# Patient Record
Sex: MALE | Race: WHITE | NOT HISPANIC OR LATINO
[De-identification: names, ages, dates, MRNs, and addresses within clinical notes are randomized per-mention and may not be internally consistent; named-entity substitution may affect disease eponyms.]

---

## 2017-01-31 ENCOUNTER — APPOINTMENT (OUTPATIENT)
Dept: HEMATOLOGY ONCOLOGY | Facility: CLINIC | Age: 77
End: 2017-01-31

## 2017-02-10 ENCOUNTER — OUTPATIENT (OUTPATIENT)
Dept: OUTPATIENT SERVICES | Facility: HOSPITAL | Age: 77
LOS: 1 days | Discharge: HOME | End: 2017-02-10

## 2017-02-10 ENCOUNTER — APPOINTMENT (OUTPATIENT)
Dept: HEMATOLOGY ONCOLOGY | Facility: CLINIC | Age: 77
End: 2017-02-10

## 2017-02-10 VITALS
HEART RATE: 68 BPM | RESPIRATION RATE: 12 BRPM | HEIGHT: 70 IN | WEIGHT: 210 LBS | BODY MASS INDEX: 30.06 KG/M2 | SYSTOLIC BLOOD PRESSURE: 133 MMHG | TEMPERATURE: 96 F | DIASTOLIC BLOOD PRESSURE: 84 MMHG

## 2017-02-10 DIAGNOSIS — Z87.438 PERSONAL HISTORY OF OTHER DISEASES OF MALE GENITAL ORGANS: ICD-10-CM

## 2017-02-10 DIAGNOSIS — Z80.6 FAMILY HISTORY OF LEUKEMIA: ICD-10-CM

## 2017-02-13 LAB
BASOPHILS # BLD: 0.03 TH/MM3
BASOPHILS NFR BLD: 0.2 %
EOSINOPHIL # BLD: 0.25 TH/MM3
EOSINOPHIL NFR BLD: 1.3 %
ERYTHROCYTE [DISTWIDTH] IN BLOOD BY AUTOMATED COUNT: 12.8 %
GRANULOCYTES # BLD: 2.63 TH/MM3
GRANULOCYTES NFR BLD: 13.3 %
HCT VFR BLD AUTO: 39.2 %
HGB BLD-MCNC: 13.5 G/DL
IMM GRANULOCYTES # BLD: 0.03 TH/MM3
IMM GRANULOCYTES NFR BLD: 0.2 %
LYMPHOCYTES # BLD: 16.17 TH/MM3
LYMPHOCYTES NFR BLD: 82 %
MCH RBC QN AUTO: 33.6 PG
MCHC RBC AUTO-ENTMCNC: 34.4 G/DL
MCV RBC AUTO: 97.5 FL
MONOCYTES # BLD: 0.6 TH/MM3
MONOCYTES NFR BLD: 3 %
PLATELET # BLD: 190 TH/MM3
PMV BLD AUTO: 8.8 FL
RBC # BLD AUTO: 4.02 MIL/MM3
WBC # BLD: 19.71 TH/MM3

## 2017-06-23 ENCOUNTER — OUTPATIENT (OUTPATIENT)
Dept: OUTPATIENT SERVICES | Facility: HOSPITAL | Age: 77
LOS: 1 days | Discharge: HOME | End: 2017-06-23

## 2017-06-23 ENCOUNTER — APPOINTMENT (OUTPATIENT)
Dept: HEMATOLOGY ONCOLOGY | Facility: CLINIC | Age: 77
End: 2017-06-23

## 2017-06-23 VITALS
RESPIRATION RATE: 16 BRPM | SYSTOLIC BLOOD PRESSURE: 115 MMHG | WEIGHT: 222 LBS | HEIGHT: 70 IN | TEMPERATURE: 96.5 F | HEART RATE: 67 BPM | DIASTOLIC BLOOD PRESSURE: 64 MMHG | BODY MASS INDEX: 31.78 KG/M2

## 2017-06-23 DIAGNOSIS — K81.0 ACUTE CHOLECYSTITIS: ICD-10-CM

## 2017-06-23 LAB
BASOPHILS # BLD: 0.03 TH/MM3
BASOPHILS NFR BLD: 0.1 %
EOSINOPHIL # BLD: 0.3 TH/MM3
EOSINOPHIL NFR BLD: 1.4 %
ERYTHROCYTE [DISTWIDTH] IN BLOOD BY AUTOMATED COUNT: 12.9 %
GRANULOCYTES # BLD: 2.73 TH/MM3
GRANULOCYTES NFR BLD: 13.3 %
HCT VFR BLD AUTO: 40.4 %
HGB BLD-MCNC: 13.7 G/DL
IMM GRANULOCYTES # BLD: 0.04 TH/MM3
IMM GRANULOCYTES NFR BLD: 0.2 %
LYMPHOCYTES # BLD: 17.34 TH/MM3
LYMPHOCYTES NFR BLD: 83.4 %
MCH RBC QN AUTO: 33.7 PG
MCHC RBC AUTO-ENTMCNC: 33.9 G/DL
MCV RBC AUTO: 99.5 FL
MONOCYTES # BLD: 0.38 TH/MM3
MONOCYTES NFR BLD: 1.8 %
PLATELET # BLD: 175 TH/MM3
PMV BLD AUTO: 9.3 FL
RBC # BLD AUTO: 4.06 MIL/MM3
WBC # BLD: 20.78 TH/MM3

## 2017-06-27 DIAGNOSIS — I10 ESSENTIAL (PRIMARY) HYPERTENSION: ICD-10-CM

## 2017-06-27 DIAGNOSIS — E78.5 HYPERLIPIDEMIA, UNSPECIFIED: ICD-10-CM

## 2017-06-27 DIAGNOSIS — E03.9 HYPOTHYROIDISM, UNSPECIFIED: ICD-10-CM

## 2017-06-27 DIAGNOSIS — I50.32 CHRONIC DIASTOLIC (CONGESTIVE) HEART FAILURE: ICD-10-CM

## 2017-06-28 DIAGNOSIS — C91.10 CHRONIC LYMPHOCYTIC LEUKEMIA OF B-CELL TYPE NOT HAVING ACHIEVED REMISSION: ICD-10-CM

## 2017-08-09 ENCOUNTER — APPOINTMENT (OUTPATIENT)
Dept: HEMATOLOGY ONCOLOGY | Facility: CLINIC | Age: 77
End: 2017-08-09

## 2017-08-10 ENCOUNTER — APPOINTMENT (OUTPATIENT)
Dept: HEMATOLOGY ONCOLOGY | Facility: CLINIC | Age: 77
End: 2017-08-10

## 2017-12-21 ENCOUNTER — APPOINTMENT (OUTPATIENT)
Dept: HEMATOLOGY ONCOLOGY | Facility: CLINIC | Age: 77
End: 2017-12-21

## 2017-12-21 ENCOUNTER — OUTPATIENT (OUTPATIENT)
Dept: OUTPATIENT SERVICES | Facility: HOSPITAL | Age: 77
LOS: 1 days | Discharge: HOME | End: 2017-12-21

## 2017-12-21 VITALS
BODY MASS INDEX: 31.5 KG/M2 | SYSTOLIC BLOOD PRESSURE: 131 MMHG | DIASTOLIC BLOOD PRESSURE: 81 MMHG | TEMPERATURE: 97.6 F | WEIGHT: 220 LBS | HEIGHT: 70 IN | HEART RATE: 68 BPM

## 2017-12-21 LAB
BASOPHILS # BLD: 0.03 TH/MM3
BASOPHILS NFR BLD: 0.2 %
EOSINOPHIL # BLD: 0.3 TH/MM3
EOSINOPHIL NFR BLD: 1.5 %
ERYTHROCYTE [DISTWIDTH] IN BLOOD BY AUTOMATED COUNT: 12.7 %
GRANULOCYTES # BLD: 2.78 TH/MM3
GRANULOCYTES NFR BLD: 13.8 %
HCT VFR BLD AUTO: 37.6 %
HGB BLD-MCNC: 12.7 G/DL
IMM GRANULOCYTES # BLD: 0.07 TH/MM3
IMM GRANULOCYTES NFR BLD: 0.4 %
LYMPHOCYTES # BLD: 15.63 TH/MM3
LYMPHOCYTES NFR BLD: 78.5 %
MCH RBC QN AUTO: 34 PG
MCHC RBC AUTO-ENTMCNC: 33.8 G/DL
MCV RBC AUTO: 100.5 FL
MONOCYTES # BLD: 1.11 TH/MM3
MONOCYTES NFR BLD: 5.6 %
PLATELET # BLD: 160 TH/MM3
PMV BLD AUTO: 9.6 FL
RBC # BLD AUTO: 3.74 MIL/MM3
WBC # BLD: 19.92 TH/MM3

## 2017-12-26 DIAGNOSIS — C91.10 CHRONIC LYMPHOCYTIC LEUKEMIA OF B-CELL TYPE NOT HAVING ACHIEVED REMISSION: ICD-10-CM

## 2018-08-28 ENCOUNTER — LABORATORY RESULT (OUTPATIENT)
Age: 78
End: 2018-08-28

## 2018-08-28 ENCOUNTER — APPOINTMENT (OUTPATIENT)
Dept: HEMATOLOGY ONCOLOGY | Facility: CLINIC | Age: 78
End: 2018-08-28

## 2018-08-28 ENCOUNTER — OUTPATIENT (OUTPATIENT)
Dept: OUTPATIENT SERVICES | Facility: HOSPITAL | Age: 78
LOS: 1 days | Discharge: HOME | End: 2018-08-28

## 2018-08-28 VITALS
WEIGHT: 220 LBS | TEMPERATURE: 97.4 F | DIASTOLIC BLOOD PRESSURE: 75 MMHG | SYSTOLIC BLOOD PRESSURE: 111 MMHG | HEART RATE: 84 BPM | HEIGHT: 70 IN | BODY MASS INDEX: 31.5 KG/M2

## 2018-08-28 NOTE — REVIEW OF SYSTEMS
[Fever] : no fever [Chills] : no chills [Fatigue] : no fatigue [Recent Change In Weight] : ~T no recent weight change [Dysphagia] : no dysphagia [Chest Pain] : no chest pain [Lower Ext Edema] : no lower extremity edema [Shortness Of Breath] : no shortness of breath [SOB on Exertion] : no shortness of breath during exertion [Abdominal Pain] : no abdominal pain [Diarrhea] : no diarrhea [Joint Pain] : no joint pain [Skin Rash] : no skin rash [Easy Bleeding] : no tendency for easy bleeding [Negative] : Allergic/Immunologic

## 2018-08-28 NOTE — HISTORY OF PRESENT ILLNESS
[de-identified] : This is a 77 year old male here to establish care. He has a history of asymptomatic CLL.  His blood work from 10/27/2015, is showing a white count of 16.8 with lymphocytosis.  Hemoglobin was 13.8 and platelets were 226.  Folate, T4, TSH, vitamin D were normal.  Calcium was 9.1.  Normal LFTs. flow cytometry positive CD5 CD 23 ZAP 70 negative CD38 cw CLL normal FISH\par \par Blood work from last visit on 2/2017 showed WBC of 19.7, lymphs 82%, HgB 13.5 and plts of 190 with MCV 97.5.\par \par He has no complaints.\par \par Had a colonoscopy 2017 states it was normal and does not want another one.\par \par \par  [de-identified] : 12/21/17\par CC" Her for follow up for my CLL"\par He is doing well. Recovering from a cold. [facility] WBc 23.5, HgB 13.7, Pts 160 CMP b12 345 and foalte was >24, LFT were mormal. His HgB fromt today shows a fall to 12.7. MCV is a bit higher. \par \par He has no B symptom\par \par 8/28/18\par He is here for follow up. He states he is doing well. He has no complaints. [facility] not get [facility].

## 2018-08-28 NOTE — PHYSICAL EXAM
[Restricted in physically strenuous activity but ambulatory and able to carry out work of a light or sedentary nature] : Status 1- Restricted in physically strenuous activity but ambulatory and able to carry out work of a light or sedentary nature, e.g., light house work, office work [Normal] : affect appropriate

## 2018-08-28 NOTE — REASON FOR VISIT
[Follow-Up Visit] : a follow-up visit for [Blood Count Assessment] : blood count assessment [FreeTextEntry2] : CLL

## 2018-08-28 NOTE — ASSESSMENT
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  \par -Will obtain a CBC with hemolysis panel as well s CMP, LDH and IgG level\par -he states he sees a dermatologist yearly \par -he is also up to date with his vaccinations and [facility], [facility]\par -RTC in 6 months  if labs are stable

## 2018-08-30 LAB
ALBUMIN SERPL ELPH-MCNC: 4.2 G/DL
ALP BLD-CCNC: 67 U/L
ALT SERPL-CCNC: 15 U/L
ANION GAP SERPL CALC-SCNC: 18 MMOL/L
AST SERPL-CCNC: 16 U/L
BILIRUB SERPL-MCNC: 0.3 MG/DL
BUN SERPL-MCNC: 18 MG/DL
CALCIUM SERPL-MCNC: 8.9 MG/DL
CHLORIDE SERPL-SCNC: 102 MMOL/L
CO2 SERPL-SCNC: 23 MMOL/L
CREAT SERPL-MCNC: 1 MG/DL
GLUCOSE SERPL-MCNC: 94 MG/DL
HAPTOGLOB SERPL-MCNC: 168 MG/DL
HCT VFR BLD CALC: 40.8 %
HGB BLD-MCNC: 13.9 G/DL
IGG SER QL IEP: 966 MG/DL
LDH SERPL-CCNC: 197 U/L
MCHC RBC-ENTMCNC: 34.1 G/DL
MCHC RBC-ENTMCNC: 34.5 PG
MCV RBC AUTO: 101.2 FL
PLATELET # BLD AUTO: 139 K/UL
PMV BLD: 9.9 FL
POTASSIUM SERPL-SCNC: 4.6 MMOL/L
PROT SERPL-MCNC: 6.8 G/DL
RBC # BLD: 4.03 M/UL
RBC # FLD: 12.6 %
SODIUM SERPL-SCNC: 143 MMOL/L
WBC # FLD AUTO: 22.14 K/UL

## 2018-09-07 DIAGNOSIS — C91.10 CHRONIC LYMPHOCYTIC LEUKEMIA OF B-CELL TYPE NOT HAVING ACHIEVED REMISSION: ICD-10-CM

## 2019-02-19 ENCOUNTER — APPOINTMENT (OUTPATIENT)
Dept: HEMATOLOGY ONCOLOGY | Facility: CLINIC | Age: 79
End: 2019-02-19

## 2019-02-19 ENCOUNTER — OUTPATIENT (OUTPATIENT)
Dept: OUTPATIENT SERVICES | Facility: HOSPITAL | Age: 79
LOS: 1 days | Discharge: HOME | End: 2019-02-19

## 2019-02-19 ENCOUNTER — LABORATORY RESULT (OUTPATIENT)
Age: 79
End: 2019-02-19

## 2019-02-19 VITALS
HEART RATE: 72 BPM | HEIGHT: 70 IN | TEMPERATURE: 96.9 F | BODY MASS INDEX: 31.21 KG/M2 | WEIGHT: 218 LBS | SYSTOLIC BLOOD PRESSURE: 120 MMHG | RESPIRATION RATE: 16 BRPM | DIASTOLIC BLOOD PRESSURE: 70 MMHG

## 2019-02-20 LAB
HCT VFR BLD CALC: 40.4 %
HGB BLD-MCNC: 13.6 G/DL
MCHC RBC-ENTMCNC: 33.7 G/DL
MCHC RBC-ENTMCNC: 33.8 PG
MCV RBC AUTO: 100.5 FL
PLATELET # BLD AUTO: 163 K/UL
PMV BLD: 9.9 FL
RBC # BLD: 4.02 M/UL
RBC # FLD: 12.4 %
WBC # FLD AUTO: 31.38 K/UL

## 2019-02-21 NOTE — HISTORY OF PRESENT ILLNESS
[de-identified] : 12/21/17\par CC" Her for follow up for my CLL"\par He is doing well. Recovering from a cold. [facility]  [facility] showed WBc 23.5, HgB 13.7, Pts 160 CMP b12 345 and foalte was >24, LFT were mormal. His HgB fromt today shows a fall to 12.7. MCV is a bit higher. \par \par He has no B symptoms.\par \par 8/28/18\par He is here for follow up. He states he is doing well. He has no complaints. [facility] not get [facility] work [facility].\par \par 2/19/19\par Patient presents for 6 month follow up with [facility]work [facility]2018.  CBC showed WBC 30.6 (lymphocyte percent not calculated), hemoglobin 14.4, and platelets of 168.  He is concerned because his bloodwork from 8/2018 showed WBC of 20 and wonders if he might need treatment.  Complains of increased fatigue since December but no fevers, night sweats, LAD, anorexia. [de-identified] : This is a 77 year old male here to establish care. He has a history of asymptomatic CLL.  His blood work from 10/27/2015, is showing a white count of 16.8 with lymphocytosis.  Hemoglobin was 13.8 and platelets were 226.  Folate, T4, TSH, vitamin D were normal.  Calcium was 9.1.  Normal LFTs. flow cytometry positive CD5 CD 23 ZAP 70 negative CD38 cw CLL normal FISH\par \par Blood work from last visit on 2/2017 showed WBC of 19.7, lymphs 82%, HgB 13.5 and plts of 190 with MCV 97.5.\par \par He has no complaints.\par \par Had a colonoscopy 2017 states it was normal and does not want another one.\par \par \par

## 2019-02-21 NOTE — REVIEW OF SYSTEMS
[Negative] : Allergic/Immunologic [Fatigue] : fatigue [Fever] : no fever [Chills] : no chills [Recent Change In Weight] : ~T no recent weight change [Dysphagia] : no dysphagia [Chest Pain] : no chest pain [Lower Ext Edema] : no lower extremity edema [Shortness Of Breath] : no shortness of breath [SOB on Exertion] : no shortness of breath during exertion [Abdominal Pain] : no abdominal pain [Diarrhea] : no diarrhea [Joint Pain] : no joint pain [Skin Rash] : no skin rash [Easy Bleeding] : no tendency for easy bleeding

## 2019-02-21 NOTE — ASSESSMENT
[FreeTextEntry1] :  1. 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  Explained to patient that an increased WBC from 20 to 30 over several months' time without symptoms (complaint of fatigue with increased WBC) does not represent an indication for treatment.  Patient understands and is in agreement.\par \par Will check repeat CBC today.  Follow up in 6 months if CBC is stabe\par He is uptodate with vaccination.

## 2019-02-25 DIAGNOSIS — C91.10 CHRONIC LYMPHOCYTIC LEUKEMIA OF B-CELL TYPE NOT HAVING ACHIEVED REMISSION: ICD-10-CM

## 2019-10-29 ENCOUNTER — OUTPATIENT (OUTPATIENT)
Dept: OUTPATIENT SERVICES | Facility: HOSPITAL | Age: 79
LOS: 1 days | Discharge: HOME | End: 2019-10-29

## 2019-10-29 ENCOUNTER — LABORATORY RESULT (OUTPATIENT)
Age: 79
End: 2019-10-29

## 2019-10-29 ENCOUNTER — APPOINTMENT (OUTPATIENT)
Dept: HEMATOLOGY ONCOLOGY | Facility: CLINIC | Age: 79
End: 2019-10-29
Payer: MEDICARE

## 2019-10-29 VITALS
BODY MASS INDEX: 30.06 KG/M2 | TEMPERATURE: 98.3 F | WEIGHT: 210 LBS | HEART RATE: 77 BPM | HEIGHT: 70 IN | SYSTOLIC BLOOD PRESSURE: 106 MMHG | DIASTOLIC BLOOD PRESSURE: 58 MMHG

## 2019-10-29 DIAGNOSIS — Z00.00 ENCOUNTER FOR GENERAL ADULT MEDICAL EXAMINATION W/OUT ABNORMAL FINDINGS: ICD-10-CM

## 2019-10-29 PROCEDURE — 99213 OFFICE O/P EST LOW 20 MIN: CPT

## 2019-10-29 NOTE — ASSESSMENT
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  WBC is stable,  complaint of fatigue  does not represent an indication for treatment since its mild and stable.  Patient understands and is in agreement.\par -he is a bit more anemic, on the macrocytic side  with normal B12 and folate, maybe related to his CLL but would not qualify to treat him yet\par \par  Follow up in 6 months, he has blood work with PCP if any significant changes can see me sooner.\par \par His PSA was up but [facility] [place].\par \par He is uptodate with vaccination.\par \par He had recent skin cancer removed from his nose.  HE follows with Derm

## 2019-10-29 NOTE — REVIEW OF SYSTEMS
[Fatigue] : fatigue [Negative] : Allergic/Immunologic [Fever] : no fever [Chills] : no chills [Recent Change In Weight] : ~T no recent weight change [Dysphagia] : no dysphagia [Chest Pain] : no chest pain [Lower Ext Edema] : no lower extremity edema [Shortness Of Breath] : no shortness of breath [SOB on Exertion] : no shortness of breath during exertion [Abdominal Pain] : no abdominal pain [Diarrhea] : no diarrhea [Joint Pain] : no joint pain [Skin Rash] : no skin rash [Easy Bleeding] : no tendency for easy bleeding

## 2019-10-29 NOTE — HISTORY OF PRESENT ILLNESS
[de-identified] : This is a 77 year old male here to establish care. He has a history of asymptomatic CLL.  His blood work from 10/27/2015, is showing a white count of 16.8 with lymphocytosis.  Hemoglobin was 13.8 and platelets were 226.  Folate, T4, TSH, vitamin D were normal.  Calcium was 9.1.  Normal LFTs. flow cytometry positive CD5 CD 23 ZAP 70 negative CD38 cw CLL normal FISH\par \par Blood work from last visit on 2/2017 showed WBC of 19.7, lymphs 82%, HgB 13.5 and plts of 190 with MCV 97.5.\par \par He has no complaints.\par \par Had a colonoscopy 2017 states it was normal and does not want another one.\par \par \par  [de-identified] : 12/21/17\par CC" Her for follow up for my CLL"\par He is doing well. Recovering from a cold. [facility] showed WBc 23.5, HgB 13.7, Pts 160 CMP b12 345 and foalte was >24, LFT were mormal. His HgB fromt today shows a fall to 12.7. MCV is a bit higher. \par \par He has no B symptoms.\par \par 8/28/18\par He is here for follow up. He states he is doing well. He has no complaints. [facility] not get [facility] work [facility].\par \par 2/19/19\par Patient presents for 6 month follow up [facility]2018.  CBC showed WBC 30.6 (lymphocyte percent not calculated), hemoglobin 14.4, and platelets of 168.  He is concerned because his bloodwork from 8/2018 showed WBC of 20 and wonders if he might need treatment.  Complains of increased fatigue since December but no fevers, night sweats, LAD, anorexia.\par \par 10/29/19\par He is here for follow up. He had blood work in [place],  Foalte was 8.5,  WBC was 27, Hgb 13.5, Plts 167, ,  PSA  8.4  from 2018. CMP was normal.  CBC from today is stable, slightly worsened anemia at 12.7 WBC is the same. He feels tired but this is chronic, he lost 8 lbs over about one year.

## 2019-10-31 LAB
HCT VFR BLD CALC: 37.7 %
HGB BLD-MCNC: 12.7 G/DL
MCHC RBC-ENTMCNC: 33.7 G/DL
MCHC RBC-ENTMCNC: 34.6 PG
MCV RBC AUTO: 102.7 FL
PLATELET # BLD AUTO: 157 K/UL
PMV BLD: 10 FL
RBC # BLD: 3.67 M/UL
RBC # FLD: 12.7 %
WBC # FLD AUTO: 26.62 K/UL

## 2019-11-04 DIAGNOSIS — C91.10 CHRONIC LYMPHOCYTIC LEUKEMIA OF B-CELL TYPE NOT HAVING ACHIEVED REMISSION: ICD-10-CM

## 2020-02-13 ENCOUNTER — LABORATORY RESULT (OUTPATIENT)
Age: 80
End: 2020-02-13

## 2020-03-03 ENCOUNTER — LABORATORY RESULT (OUTPATIENT)
Age: 80
End: 2020-03-03

## 2020-03-03 ENCOUNTER — APPOINTMENT (OUTPATIENT)
Dept: HEMATOLOGY ONCOLOGY | Facility: CLINIC | Age: 80
End: 2020-03-03

## 2020-03-03 ENCOUNTER — OUTPATIENT (OUTPATIENT)
Dept: OUTPATIENT SERVICES | Facility: HOSPITAL | Age: 80
LOS: 1 days | Discharge: HOME | End: 2020-03-03

## 2020-03-05 LAB
ALBUMIN SERPL ELPH-MCNC: 4.3 G/DL
ALP BLD-CCNC: 62 U/L
ALT SERPL-CCNC: 16 U/L
ANION GAP SERPL CALC-SCNC: 12 MMOL/L
AST SERPL-CCNC: 17 U/L
BILIRUB SERPL-MCNC: 0.4 MG/DL
BUN SERPL-MCNC: 18 MG/DL
CALCIUM SERPL-MCNC: 8.7 MG/DL
CHLORIDE SERPL-SCNC: 104 MMOL/L
CO2 SERPL-SCNC: 24 MMOL/L
CREAT SERPL-MCNC: 0.8 MG/DL
GLUCOSE SERPL-MCNC: 130 MG/DL
HCT VFR BLD CALC: 37.4 %
HGB BLD-MCNC: 12.5 G/DL
LDH SERPL-CCNC: 175 U/L
MCHC RBC-ENTMCNC: 33.4 G/DL
MCHC RBC-ENTMCNC: 33.9 PG
MCV RBC AUTO: 101.4 FL
PLATELET # BLD AUTO: 126 K/UL
PMV BLD: 9.9 FL
POTASSIUM SERPL-SCNC: 4 MMOL/L
PROT SERPL-MCNC: 6.1 G/DL
RBC # BLD: 3.69 M/UL
RBC # FLD: 12.8 %
SODIUM SERPL-SCNC: 140 MMOL/L
WBC # FLD AUTO: 26.43 K/UL

## 2020-07-13 ENCOUNTER — TRANSCRIPTION ENCOUNTER (OUTPATIENT)
Age: 80
End: 2020-07-13

## 2020-08-05 ENCOUNTER — APPOINTMENT (OUTPATIENT)
Dept: HEMATOLOGY ONCOLOGY | Facility: CLINIC | Age: 80
End: 2020-08-05
Payer: MEDICARE

## 2020-08-05 VITALS
HEART RATE: 78 BPM | WEIGHT: 201 LBS | RESPIRATION RATE: 16 BRPM | SYSTOLIC BLOOD PRESSURE: 100 MMHG | HEIGHT: 70 IN | DIASTOLIC BLOOD PRESSURE: 60 MMHG | BODY MASS INDEX: 28.77 KG/M2

## 2020-08-05 PROCEDURE — 99213 OFFICE O/P EST LOW 20 MIN: CPT

## 2020-08-05 RX ORDER — ATORVASTATIN CALCIUM 10 MG/1
10 TABLET, FILM COATED ORAL
Qty: 30 | Refills: 0 | Status: ACTIVE | COMMUNITY
Start: 2020-02-04

## 2020-08-06 NOTE — HISTORY OF PRESENT ILLNESS
[de-identified] : This is a 77 year old male here to establish care. He has a history of asymptomatic CLL.  His blood work from 10/27/2015, is showing a white count of 16.8 with lymphocytosis.  Hemoglobin was 13.8 and platelets were 226.  Folate, T4, TSH, vitamin D were normal.  Calcium was 9.1.  Normal LFTs. flow cytometry positive CD5 CD 23 ZAP 70 negative CD38 cw CLL normal FISH\par \par Blood work from last visit on 2/2017 showed WBC of 19.7, lymphs 82%, HgB 13.5 and plts of 190 with MCV 97.5.\par \par He has no complaints.\par \par Had a colonoscopy 2017 states it was normal and does not want another one.\par \par \par  [de-identified] : 12/21/17\par CC" Her for follow up for my CLL"\par He is doing well. Recovering from a cold. [facility]  [facility] showed WBc 23.5, HgB 13.7, Pts 160 CMP b12 345 and foalte was >24, LFT were mormal. His HgB fromt today shows a fall to 12.7. MCV is a bit higher. \par \par He has no B symptoms.\par \par 8/28/18\par He is here for follow up. He states he is doing well. He has no complaints. [facility] not [facility].\par \par 2/19/19\par Patient presents for 6 month follow up [facility]2018.  CBC showed WBC 30.6 (lymphocyte percent not calculated), hemoglobin 14.4, and platelets of 168.  He is concerned because his bloodwork from 8/2018 showed WBC of 20 and wonders if he might need treatment.  Complains of increased fatigue since December but no fevers, night sweats, LAD, anorexia.\par \par 10/29/19\par He is here for follow up. He had blood work in [place],  Foalte was 8.5,  WBC was 27, Hgb 13.5, Plts 167, ,  PSA  8.4  from 2018. CMP was normal.  CBC from today is stable, slightly worsened anemia at 12.7 WBC is the same. He feels tired but this is chronic, he lost 8 lbs over about one year. \par \par 8/5/20\par He is here for follow up. he ahd CBC done on 7/31 in [facility]. WBC 25, Plts 129, Hgb 12.6 He has no complaints. He lost about 10 lbs.

## 2020-08-06 NOTE — REVIEW OF SYSTEMS
[Negative] : Allergic/Immunologic [Chills] : no chills [Fever] : no fever [Dysphagia] : no dysphagia [Fatigue] : no fatigue [Lower Ext Edema] : no lower extremity edema [Chest Pain] : no chest pain [SOB on Exertion] : no shortness of breath during exertion [Shortness Of Breath] : no shortness of breath [Abdominal Pain] : no abdominal pain [Diarrhea] : no diarrhea [Joint Pain] : no joint pain [Easy Bleeding] : no tendency for easy bleeding [Skin Rash] : no skin rash

## 2020-08-06 NOTE — ASSESSMENT
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  WBC is stable,  complaint of fatigue  does not represent an indication for treatment since its mild and stable.  Patient understands and is in agreement.\par -he is a bit more anemic, on the macrocytic side  with normal B12 and folate, maybe related to his CLL but would not qualify to treat him yet. He has some weight loss as well will monitor.\par \par  Follow up in 6 months, he has blood work with PCP if any significant changes can see me sooner.\par \par His PSA was up but [facility] [place].\par \par He is uptodate with vaccination.\par \par He had recent skin cancer removed from his nose.  HE follows with Derm

## 2020-10-07 ENCOUNTER — APPOINTMENT (OUTPATIENT)
Dept: HEMATOLOGY ONCOLOGY | Facility: CLINIC | Age: 80
End: 2020-10-07
Payer: MEDICARE

## 2020-10-07 ENCOUNTER — LABORATORY RESULT (OUTPATIENT)
Age: 80
End: 2020-10-07

## 2020-10-07 VITALS
DIASTOLIC BLOOD PRESSURE: 62 MMHG | TEMPERATURE: 98.7 F | RESPIRATION RATE: 18 BRPM | WEIGHT: 199 LBS | SYSTOLIC BLOOD PRESSURE: 106 MMHG | BODY MASS INDEX: 28.49 KG/M2 | HEIGHT: 70 IN

## 2020-10-07 LAB
ALBUMIN SERPL ELPH-MCNC: 4 G/DL
ALP BLD-CCNC: 75 U/L
ALT SERPL-CCNC: 19 U/L
ANION GAP SERPL CALC-SCNC: 8 MMOL/L
AST SERPL-CCNC: 22 U/L
BILIRUB SERPL-MCNC: 0.3 MG/DL
BUN SERPL-MCNC: 16 MG/DL
CALCIUM SERPL-MCNC: 8.7 MG/DL
CHLORIDE SERPL-SCNC: 107 MMOL/L
CO2 SERPL-SCNC: 23 MMOL/L
CREAT SERPL-MCNC: 0.9 MG/DL
GLUCOSE SERPL-MCNC: 97 MG/DL
HCT VFR BLD CALC: 37.1 %
HGB BLD-MCNC: 12.2 G/DL
LDH SERPL-CCNC: 232 U/L
MCHC RBC-ENTMCNC: 32.9 G/DL
MCHC RBC-ENTMCNC: 33.4 PG
MCV RBC AUTO: 101.6 FL
PLATELET # BLD AUTO: 158 K/UL
PMV BLD: 10 FL
POTASSIUM SERPL-SCNC: 4.5 MMOL/L
PROT SERPL-MCNC: 5.7 G/DL
RBC # BLD: 3.65 M/UL
RBC # FLD: 12.9 %
SODIUM SERPL-SCNC: 138 MMOL/L
WBC # FLD AUTO: 20.95 K/UL

## 2020-10-07 PROCEDURE — 99214 OFFICE O/P EST MOD 30 MIN: CPT

## 2020-10-08 NOTE — ASSESSMENT
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  WBC is stable,  complaint of fatigue  does not represent an indication for treatment since its mild and stable.  Patient understands and is in agreement.\par - he is a bit more anemic, on the macrocytic side  with normal B12 and folate, maybe related to his CLL but would not qualify to treat him yet. He has some weight loss as well will monitor.\par - in light of his night sweats and reported significant fatigue, we agreed to send for CT imaging to examine for active disease\par \par His PSA was up but [facility] [place].  He had recent skin cancer removed from his nose.  He follows with Derm\par \par RTC in 6 months, he has blood work with PCP if any significant changes can see me sooner.

## 2020-10-08 NOTE — HISTORY OF PRESENT ILLNESS
[de-identified] : This is a 77 year old male here to establish care. He has a history of asymptomatic CLL.  His blood work from 10/27/2015, is showing a white count of 16.8 with lymphocytosis.  Hemoglobin was 13.8 and platelets were 226.  Folate, T4, TSH, vitamin D were normal.  Calcium was 9.1.  Normal LFTs. flow cytometry positive CD5 CD 23 ZAP 70 negative CD38 cw CLL normal FISH\par \par Blood work from last visit on 2/2017 showed WBC of 19.7, lymphs 82%, HgB 13.5 and plts of 190 with MCV 97.5.\par \par He has no complaints.\par \par Had a colonoscopy 2017 states it was normal and does not want another one.\par \par \par  [de-identified] : 12/21/17\par CC" Her for follow up for my CLL"\par He is doing well. Recovering from a cold. [facility]  [facility] showed WBc 23.5, HgB 13.7, Pts 160 CMP b12 345 and foalte was >24, LFT were mormal. His HgB fromt today shows a fall to 12.7. MCV is a bit higher. \par \par He has no B symptoms.\par \par 8/28/18\par He is here for follow up. He states he is doing well. He has no complaints. [facility] not [facility].\par \par 2/19/19\par Patient presents for 6 month follow up [facility].  CBC showed WBC 30.6 (lymphocyte percent not calculated), hemoglobin 14.4, and platelets of 168.  He is concerned because his bloodwork from 8/2018 showed WBC of 20 and wonders if he might need treatment.  Complains of increased fatigue since December but no fevers, night sweats, LAD, anorexia.\par \par 10/29/19\par He is here for follow up. He had blood work in [place],  Foalte was 8.5,  WBC was 27, Hgb 13.5, Plts 167, ,  PSA  8.4  from 2018. CMP was normal.  CBC from today is stable, slightly worsened anemia at 12.7 WBC is the same. He feels tired but this is chronic, he lost 8 lbs over about one year. \par \par 8/5/20\par He is here for follow up. he ahd CBC done on 7/31 in [facility]. WBC 25, Plts 129, Hgb 12.6 He has no complaints. He lost about 10 lbs. \par \par 10/7/2020\par Patient is here for a follow-up visit for CLL, accompanied by his daughter.  He is feeling well but states that he has been more fatigued lately and had an episode of night sweats last week which have since resolved.  Most recent CBC is stable with mild leukocytosis.  Patient denies fever, chills, nausea, vomiting, new palpable adenopathy or bleeding.

## 2020-10-08 NOTE — REVIEW OF SYSTEMS
[Negative] : Allergic/Immunologic [Night Sweats] : night sweats [Fatigue] : fatigue [Constipation] : constipation [Joint Pain] : joint pain [Fever] : no fever [Chills] : no chills [Dysphagia] : no dysphagia [Chest Pain] : no chest pain [Lower Ext Edema] : no lower extremity edema [Shortness Of Breath] : no shortness of breath [SOB on Exertion] : no shortness of breath during exertion [Abdominal Pain] : no abdominal pain [Diarrhea] : no diarrhea [Skin Rash] : no skin rash [Easy Bleeding] : no tendency for easy bleeding [FreeTextEntry2] : 3 days of night sweats last week, occasionally occurs and since resolved [FreeTextEntry7] : intermittent [FreeTextEntry9] : R low back pain

## 2021-01-20 ENCOUNTER — OUTPATIENT (OUTPATIENT)
Dept: OUTPATIENT SERVICES | Facility: HOSPITAL | Age: 81
LOS: 1 days | Discharge: HOME | End: 2021-01-20

## 2021-01-20 ENCOUNTER — APPOINTMENT (OUTPATIENT)
Dept: HEMATOLOGY ONCOLOGY | Facility: CLINIC | Age: 81
End: 2021-01-20
Payer: MEDICARE

## 2021-01-20 VITALS
RESPIRATION RATE: 18 BRPM | SYSTOLIC BLOOD PRESSURE: 130 MMHG | HEIGHT: 70 IN | DIASTOLIC BLOOD PRESSURE: 64 MMHG | BODY MASS INDEX: 28.49 KG/M2 | WEIGHT: 199 LBS | HEART RATE: 76 BPM

## 2021-01-20 DIAGNOSIS — C91.10 CHRONIC LYMPHOCYTIC LEUKEMIA OF B-CELL TYPE NOT HAVING ACHIEVED REMISSION: ICD-10-CM

## 2021-01-20 DIAGNOSIS — R61 GENERALIZED HYPERHIDROSIS: ICD-10-CM

## 2021-01-20 PROCEDURE — 99213 OFFICE O/P EST LOW 20 MIN: CPT

## 2021-01-20 NOTE — PHYSICAL EXAM
[Restricted in physically strenuous activity but ambulatory and able to carry out work of a light or sedentary nature] : Status 1- Restricted in physically strenuous activity but ambulatory and able to carry out work of a light or sedentary nature, e.g., light house work, office work [Normal] : affect appropriate [de-identified] : No adenopathy

## 2021-01-20 NOTE — HISTORY OF PRESENT ILLNESS
[de-identified] : This is a 77 year old male here to establish care. He has a history of asymptomatic CLL.  His blood work from 10/27/2015, is showing a white count of 16.8 with lymphocytosis.  Hemoglobin was 13.8 and platelets were 226.  Folate, T4, TSH, vitamin D were normal.  Calcium was 9.1.  Normal LFTs. flow cytometry positive CD5 CD 23 ZAP 70 negative CD38 cw CLL normal FISH\par \par Blood work from last visit on 2/2017 showed WBC of 19.7, lymphs 82%, HgB 13.5 and plts of 190 with MCV 97.5.\par \par He has no complaints.\par \par Had a colonoscopy 2017 states it was normal and does not want another one.\par \par \par  [de-identified] : 12/21/17\par CC" Her for follow up for my CLL"\par He is doing well. Recovering from a cold. [facility]  [facility] showed WBc 23.5, HgB 13.7, Pts 160 CMP b12 345 and foalte was >24, LFT were mormal. His HgB fromt today shows a fall to 12.7. MCV is a bit higher. \par \par He has no B symptoms.\par \par 8/28/18\par He is here for follow up. He states he is doing well. He has no complaints. [facility] not [facility].\par \par 2/19/19\par Patient presents for 6 month follow up [facility].  CBC showed WBC 30.6 (lymphocyte percent not calculated), hemoglobin 14.4, and platelets of 168.  He is concerned because his bloodwork from 8/2018 showed WBC of 20 and wonders if he might need treatment.  Complains of increased fatigue since December but no fevers, night sweats, LAD, anorexia.\par \par 10/29/19\par He is here for follow up. He had blood work in [place],  Foalte was 8.5,  WBC was 27, Hgb 13.5, Plts 167, ,  PSA  8.4  from 2018. CMP was normal.  CBC from today is stable, slightly worsened anemia at 12.7 WBC is the same. He feels tired but this is chronic, he lost 8 lbs over about one year. \par \par 8/5/20\par He is here for follow up. he ahd CBC done on 7/31 in [facility]. WBC 25, Plts 129, Hgb 12.6 He has no complaints. He lost about 10 lbs. \par \par 10/7/2020\par Patient is here for a follow-up visit for CLL, accompanied by his daughter.  He is feeling well but states that he has been more fatigued lately and had an episode of night sweats last week which have since resolved.  Most recent CBC is stable with mild leukocytosis.  Patient denies fever, chills, nausea, vomiting, new palpable adenopathy or bleeding.\par \par 1/20/21\par HE is here for follow  up . Since last visit he had CT C/A/P [facility]: Mild adenopathy in chest and axillla, L Splenomegally with 14.4 cm spleen, he has adenopathy but largest was 3.1 cm  cm in left external iliac node. He had CBC fron on 1/7/21: WBC was 23, Hgb 12.3, Plts 157,   UA is normal. CM is normal. Essentially a stable CBC.   He had a UTI end of December with fevers and night sweats and also his testicle was swollen. He had 10 days of IM and IV antibiotics by Dr. Roche and his sweats were bothersome and he also lost some with  that time. Since than he has gained weight back and only reports 2 mild night sweats in last 2 weeks.  His last UTI was over 20  years back.

## 2021-01-20 NOTE — CONSULT LETTER
[Dear  ___] : Dear  [unfilled], [Courtesy Letter:] : I had the pleasure of seeing your patient, [unfilled], in my office today. [Please see my note below.] : Please see my note below. [Sincerely,] : Sincerely, [FreeTextEntry1] : A [FreeTextEntry3] : Maciel

## 2021-01-20 NOTE — REVIEW OF SYSTEMS
[Night Sweats] : night sweats [Fatigue] : fatigue [Constipation] : constipation [Joint Pain] : joint pain [Negative] : Allergic/Immunologic [Fever] : no fever [Chills] : no chills [Dysphagia] : no dysphagia [Chest Pain] : no chest pain [Lower Ext Edema] : no lower extremity edema [Shortness Of Breath] : no shortness of breath [SOB on Exertion] : no shortness of breath during exertion [Abdominal Pain] : no abdominal pain [Diarrhea] : no diarrhea [Skin Rash] : no skin rash [Easy Bleeding] : no tendency for easy bleeding [FreeTextEntry2] : see above [FreeTextEntry7] : intermittent [FreeTextEntry9] : R low back pain

## 2021-04-28 ENCOUNTER — OUTPATIENT (OUTPATIENT)
Dept: OUTPATIENT SERVICES | Facility: HOSPITAL | Age: 81
LOS: 1 days | Discharge: HOME | End: 2021-04-28

## 2021-04-28 ENCOUNTER — LABORATORY RESULT (OUTPATIENT)
Age: 81
End: 2021-04-28

## 2021-04-28 ENCOUNTER — APPOINTMENT (OUTPATIENT)
Dept: HEMATOLOGY ONCOLOGY | Facility: CLINIC | Age: 81
End: 2021-04-28
Payer: MEDICARE

## 2021-04-28 VITALS
SYSTOLIC BLOOD PRESSURE: 108 MMHG | RESPIRATION RATE: 18 BRPM | WEIGHT: 197 LBS | HEIGHT: 70 IN | TEMPERATURE: 98.1 F | DIASTOLIC BLOOD PRESSURE: 63 MMHG | BODY MASS INDEX: 28.2 KG/M2 | HEART RATE: 83 BPM

## 2021-04-28 LAB
HCT VFR BLD CALC: 36.3 %
HGB BLD-MCNC: 12 G/DL
MCHC RBC-ENTMCNC: 33.1 G/DL
MCHC RBC-ENTMCNC: 34.3 PG
MCV RBC AUTO: 103.7 FL
PLATELET # BLD AUTO: 164 K/UL
PMV BLD: 9.1 FL
RBC # BLD: 3.5 M/UL
RBC # FLD: 13.2 %
WBC # FLD AUTO: 22.52 K/UL

## 2021-04-28 PROCEDURE — 99212 OFFICE O/P EST SF 10 MIN: CPT

## 2021-04-28 NOTE — HISTORY OF PRESENT ILLNESS
[de-identified] : This is a 77 year old male here to establish care. He has a history of asymptomatic CLL.  His blood work from 10/27/2015, is showing a white count of 16.8 with lymphocytosis.  Hemoglobin was 13.8 and platelets were 226.  Folate, T4, TSH, vitamin D were normal.  Calcium was 9.1.  Normal LFTs. flow cytometry positive CD5 CD 23 ZAP 70 negative CD38 cw CLL normal FISH\par \par Blood work from last visit on 2/2017 showed WBC of 19.7, lymphs 82%, HgB 13.5 and plts of 190 with MCV 97.5.\par \par He has no complaints.\par \par Had a colonoscopy 2017 states it was normal and does not want another one.\par \par \par  [de-identified] : 12/21/17\par CC" Her for follow up for my CLL"\par He is doing well. Recovering from a cold. [facility]  [facility] showed WBc 23.5, HgB 13.7, Pts 160 CMP b12 345 and foalte was >24, LFT were mormal. His HgB fromt today shows a fall to 12.7. MCV is a bit higher. \par \par He has no B symptoms.\par \par 8/28/18\par He is here for follow up. He states he is doing well. He has no complaints. [facility] not [facility] work from [facility].\par \par 2/19/19\par Patient presents for 6 month follow up [facility].  CBC showed WBC 30.6 (lymphocyte percent not calculated), hemoglobin 14.4, and platelets of 168.  He is concerned because his bloodwork from 8/2018 showed WBC of 20 and wonders if he might need treatment.  Complains of increased fatigue since December but no fevers, night sweats, LAD, anorexia.\par \par 10/29/19\par He is here for follow up. He had blood work in [place],  Foalte was 8.5,  WBC was 27, Hgb 13.5, Plts 167, ,  PSA  8.4  from 2018. CMP was normal.  CBC from today is stable, slightly worsened anemia at 12.7 WBC is the same. He feels tired but this is chronic, he lost 8 lbs over about one year. \par \par 8/5/20\par He is here for follow up. he ahd CBC done on 7/31 in [facility]. WBC 25, Plts 129, Hgb 12.6 He has no complaints. He lost about 10 lbs. \par \par 10/7/2020\par Patient is here for a follow-up visit for CLL, accompanied by his daughter.  He is feeling well but states that he has been more fatigued lately and had an episode of night sweats last week which have since resolved.  Most recent CBC is stable with mild leukocytosis.  Patient denies fever, chills, nausea, vomiting, new palpable adenopathy or bleeding.\par \par 1/20/21\par HE is here for follow  up . Since last visit he had CT C/A/P 10/2020 [facility]: Mild adenopathy in chest and axillla, L Splenomegally with 14.4 cm spleen, he has adenopathy but largest was 3.1 cm  cm in left external iliac node. He had CBC fron on 1/7/21: WBC was 23, Hgb 12.3, Plts 157,   UA is normal. CM is normal. Essentially a stable CBC.   He had a UTI end of December with fevers and night sweats and also his testicle was swollen. He had 10 days of IM and IV antibiotics by Dr. Roche and his sweats were bothersome and he also lost some with  that time. Since than he has gained weight back and only reports 2 mild night sweats in last 2 weeks.  His last UTI was over 20  years back. \par \par 4/28/21\par He feels well. States night sweats are now maybe at most once a month. No other complaints. CBC is stable

## 2021-04-28 NOTE — ASSESSMENT
[FreeTextEntry1] : #CLL, Gracia stage II, mild splenomegaly, with adenopathy   on observation for nearly 10 years now.  WBC is stable, hgb is stable.   complaint of fatigue  and had night sweats that are now subsiding form recent UTI. \par - hgb is stable and is over 12 , on the macrocytic side  with normal B12 and folate, maybe related to his CLL but would not qualify to treat him yet. He has some weight loss but has gained weight back and has been stable\par -  to c/w observation \par -if has another infection will check  his immunoglobulins last one was in 2018 and IgG was normal. \par \par #elevated  PSA  [facility] [place] now sees Dr. Rohce\par \par #  He had skin cancer removed from his nose.  He follows with Derm\par \par RTC in  6 months

## 2021-04-28 NOTE — CONSULT LETTER
[Dear  ___] : Dear  [unfilled], [Courtesy Letter:] : I had the pleasure of seeing your patient, [unfilled], in my office today. [Please see my note below.] : Please see my note below. [Sincerely,] : Sincerely, [FreeTextEntry3] : Maciel

## 2021-04-28 NOTE — REVIEW OF SYSTEMS
[Night Sweats] : night sweats [Constipation] : constipation [Negative] : Allergic/Immunologic [Fever] : no fever [Chills] : no chills [Fatigue] : no fatigue [Dysphagia] : no dysphagia [Chest Pain] : no chest pain [Lower Ext Edema] : no lower extremity edema [Shortness Of Breath] : no shortness of breath [SOB on Exertion] : no shortness of breath during exertion [Abdominal Pain] : no abdominal pain [Diarrhea] : no diarrhea [Joint Pain] : no joint pain [Skin Rash] : no skin rash [Easy Bleeding] : no tendency for easy bleeding [FreeTextEntry2] : once a month at most  [FreeTextEntry7] : intermittent

## 2021-04-30 DIAGNOSIS — C91.10 CHRONIC LYMPHOCYTIC LEUKEMIA OF B-CELL TYPE NOT HAVING ACHIEVED REMISSION: ICD-10-CM

## 2021-10-27 ENCOUNTER — LABORATORY RESULT (OUTPATIENT)
Age: 81
End: 2021-10-27

## 2021-10-27 ENCOUNTER — APPOINTMENT (OUTPATIENT)
Dept: HEMATOLOGY ONCOLOGY | Facility: CLINIC | Age: 81
End: 2021-10-27
Payer: MEDICARE

## 2021-10-27 VITALS
TEMPERATURE: 97.1 F | RESPIRATION RATE: 18 BRPM | HEART RATE: 72 BPM | DIASTOLIC BLOOD PRESSURE: 66 MMHG | SYSTOLIC BLOOD PRESSURE: 130 MMHG | HEIGHT: 70 IN

## 2021-10-27 LAB
HCT VFR BLD CALC: 36.7 %
HGB BLD-MCNC: 12.4 G/DL
MCHC RBC-ENTMCNC: 33.8 G/DL
MCHC RBC-ENTMCNC: 34.8 PG
MCV RBC AUTO: 103.1 FL
PLATELET # BLD AUTO: 136 K/UL
PMV BLD: 9.6 FL
RBC # BLD: 3.56 M/UL
RBC # FLD: 12.6 %
WBC # FLD AUTO: 28.84 K/UL

## 2021-10-27 PROCEDURE — 99213 OFFICE O/P EST LOW 20 MIN: CPT

## 2021-10-31 NOTE — HISTORY OF PRESENT ILLNESS
[de-identified] : This is a 77 year old male here to establish care. He has a history of asymptomatic CLL.  His blood work from 10/27/2015, is showing a white count of 16.8 with lymphocytosis.  Hemoglobin was 13.8 and platelets were 226.  Folate, T4, TSH, vitamin D were normal.  Calcium was 9.1.  Normal LFTs. flow cytometry positive CD5 CD 23 ZAP 70 negative CD38 cw CLL normal FISH\par \par Blood work from last visit on 2/2017 showed WBC of 19.7, lymphs 82%, HgB 13.5 and plts of 190 with MCV 97.5.\par \par He has no complaints.\par \par Had a colonoscopy 2017 states it was normal and does not want another one.\par \par \par  [de-identified] : 12/21/17\par CC" Her for follow up for my CLL"\par He is doing well. Recovering from a cold. [facility] WBc 23.5, HgB 13.7, Pts 160 CMP b12 345 and foalte was >24, LFT were mormal. His HgB fromt today shows a fall to 12.7. MCV is a bit higher. \par \par He has no B symptoms.\par \par 8/28/18\par He is here for follow up. He states he is doing well. He has no complaints. [facility] not get [facility] work from [facility].\par \par 2/19/19\par Patient presents for 6 month follow up [facility].  CBC showed WBC 30.6 (lymphocyte percent not calculated), hemoglobin 14.4, and platelets of 168.  He is concerned because his bloodwork from 8/2018 showed WBC of 20 and wonders if he might need treatment.  Complains of increased fatigue since December but no fevers, night sweats, LAD, anorexia.\par \par 10/29/19\par He is here for follow up. He had blood work in Septemer 25th,  Foalte was 8.5,  WBC was 27, Hgb 13.5, Plts 167, ,  PSA  8.4  from 2018. CMP was normal.  CBC from today is stable, slightly worsened anemia at 12.7 WBC is the same. He feels tired but this is chronic, he lost 8 lbs over about one year. \par \par 8/5/20\par He is here for follow up. he ahd CBC done on 7/31 in [facility]. WBC 25, Plts 129, Hgb 12.6 He has no complaints. He lost about 10 lbs. \par \par 10/7/2020\par Patient is here for a follow-up visit for CLL, accompanied by his daughter.  He is feeling well but states that he has been more fatigued lately and had an episode of night sweats last week which have since resolved.  Most recent CBC is stable with mild leukocytosis.  Patient denies fever, chills, nausea, vomiting, new palpable adenopathy or bleeding.\par \par 1/20/21\par HE is here for follow  up . Since last visit he had CT C/A/P 10/2020 [facility]: Mild adenopathy in chest and axillla, L Splenomegally with 14.4 cm spleen, he has adenopathy but largest was 3.1 cm  cm in left external iliac node. He had CBC fron on 1/7/21: WBC was 23, Hgb 12.3, Plts 157,   UA is normal. CM is normal. Essentially a stable CBC.   He had a UTI end of December with fevers and night sweats and also his testicle was swollen. He had 10 days of IM and IV antibiotics by Dr. Roche and his sweats were bothersome and he also lost some with  that time. Since than he has gained weight back and only reports 2 mild night sweats in last 2 weeks.  His last UTI was over 20  years back. \par \par 4/28/21\par He feels well. States night sweats are now maybe at most once a month. No other complaints. CBC is stable \par \par 10/27/21\par Patient is here for a follow-up visit for CLL, accompanied by his daughter.  He is feeling well but states that he has been more fatigued lately and had an episode of night sweats last week which are intermittent.  Most recent CBC is stable with mild leukocytosis with WBC 28.  Patient denies fever, chills, nausea, vomiting, new palpable adenopathy or bleeding.  [facility] and WBC was 30.7 and B12 was on the lower end of normal so he started B12 supplement.  \par [facility] ([facility] - [facility]) WBC 30.7, Hgb 12.9, , Cr 0.9, Vit B12 is 254, eGFR 87, PSA 2.83, TSH 3.2, Folate 9.9

## 2021-10-31 NOTE — END OF VISIT
[FreeTextEntry3] : I was physically present for the key portions of the evaluation and management service provided.  I agree with the history and physical, and plan which I have reviewed and edited where appropriate.\par \par He returns for follow-up for her CLL there is currently no evidence that it is active.  CBC is stable and she rarely if ever has any night sweats.  We will continue with observation.  He will come back and see us in 6 months.\par \par Follow-up with primary care for age-appropriate cancer screening and vaccinations.

## 2021-10-31 NOTE — ASSESSMENT
[FreeTextEntry1] : #CLL, Gracia stage II, mild splenomegaly, with adenopathy   on observation for nearly 10 years now.  WBC is stable, hgb is stable.   complaint of fatigue  and had night sweats that are now subsiding form recent UTI. \par - hgb is stable and is over 12 , on the macrocytic side with lwo to normal B12 and folate, may be related to his CLL, but would not qualify to treat him yet. He has some weight loss but has gained weight back and has been stable.\par - c/w close observation for now, he knows to call us if he develops new or worsening of night sweats or associated symptoms\par - if has another infection will check  his immunoglobulins last one was in 2018 and IgG was normal. \par \par #elevated  PSA , PSA 2.83 from 09/2021\par - [facility] to [facility] [place] ; reviewed most recent lab workup\par - now sees Dr. Roche\par \par #  He had skin cancer removed from his nose. \par - follows with DERM\par \par RTC in 6 months with CBC , sooner if needed

## 2021-10-31 NOTE — REVIEW OF SYSTEMS
[Night Sweats] : night sweats [Constipation] : constipation [Negative] : Allergic/Immunologic [Fatigue] : fatigue [Fever] : no fever [Chills] : no chills [Recent Change In Weight] : ~T no recent weight change [Dysphagia] : no dysphagia [Chest Pain] : no chest pain [Lower Ext Edema] : no lower extremity edema [Shortness Of Breath] : no shortness of breath [SOB on Exertion] : no shortness of breath during exertion [Abdominal Pain] : no abdominal pain [Diarrhea] : no diarrhea [Joint Pain] : no joint pain [Skin Rash] : no skin rash [Easy Bleeding] : no tendency for easy bleeding [FreeTextEntry2] : once a week at most

## 2021-12-09 ENCOUNTER — TRANSCRIPTION ENCOUNTER (OUTPATIENT)
Age: 81
End: 2021-12-09

## 2022-04-20 ENCOUNTER — OUTPATIENT (OUTPATIENT)
Dept: OUTPATIENT SERVICES | Facility: HOSPITAL | Age: 82
LOS: 1 days | Discharge: HOME | End: 2022-04-20

## 2022-04-20 ENCOUNTER — APPOINTMENT (OUTPATIENT)
Dept: HEMATOLOGY ONCOLOGY | Facility: CLINIC | Age: 82
End: 2022-04-20
Payer: MEDICARE

## 2022-04-20 ENCOUNTER — LABORATORY RESULT (OUTPATIENT)
Age: 82
End: 2022-04-20

## 2022-04-20 DIAGNOSIS — C91.10 CHRONIC LYMPHOCYTIC LEUKEMIA OF B-CELL TYPE NOT HAVING ACHIEVED REMISSION: ICD-10-CM

## 2022-04-20 LAB
HCT VFR BLD CALC: 36 %
HGB BLD-MCNC: 11.8 G/DL
MCHC RBC-ENTMCNC: 32.8 G/DL
MCHC RBC-ENTMCNC: 34.3 PG
MCV RBC AUTO: 104.7 FL
PLATELET # BLD AUTO: 129 K/UL
PMV BLD: 9.4 FL
RBC # BLD: 3.44 M/UL
RBC # FLD: 13 %
WBC # FLD AUTO: 29.59 K/UL

## 2022-04-20 PROCEDURE — 99214 OFFICE O/P EST MOD 30 MIN: CPT

## 2022-04-20 NOTE — REVIEW OF SYSTEMS
[Night Sweats] : night sweats [Constipation] : constipation [Negative] : Allergic/Immunologic [Fever] : no fever [Chills] : no chills [Fatigue] : no fatigue [Recent Change In Weight] : ~T no recent weight change [Dysphagia] : no dysphagia [Chest Pain] : no chest pain [Lower Ext Edema] : no lower extremity edema [Shortness Of Breath] : no shortness of breath [SOB on Exertion] : no shortness of breath during exertion [Abdominal Pain] : no abdominal pain [Diarrhea] : no diarrhea [Joint Pain] : no joint pain [Skin Rash] : no skin rash [Easy Bleeding] : no tendency for easy bleeding [FreeTextEntry2] : once or twice a month

## 2022-04-20 NOTE — HISTORY OF PRESENT ILLNESS
[de-identified] : This is a 77 year old male here to establish care. He has a history of asymptomatic CLL.  His blood work from 10/27/2015, is showing a white count of 16.8 with lymphocytosis.  Hemoglobin was 13.8 and platelets were 226.  Folate, T4, TSH, vitamin D were normal.  Calcium was 9.1.  Normal LFTs. flow cytometry positive CD5 CD 23 ZAP 70 negative CD38 cw CLL normal FISH\par \par Blood work from last visit on 2/2017 showed WBC of 19.7, lymphs 82%, HgB 13.5 and plts of 190 with MCV 97.5.\par \par He has no complaints.\par \par Had a colonoscopy 2017 states it was normal and does not want another one.\par \par \par  [de-identified] : 12/21/17\par CC" Her for follow up for my CLL"\par He is doing well. Recovering from a cold. [facility] showed WBc 23.5, HgB 13.7, Pts 160 CMP b12 345 and foalte was >24, LFT were mormal. His HgB fromt today shows a fall to 12.7. MCV is a bit higher. \par \par He has no B symptoms.\par \par 8/28/18\par He is here for follow up. He states he is doing well. He has no complaints. [facility] not get [facility].\par \par 2/19/19\par Patient presents for 6 month follow up [facility].  CBC showed WBC 30.6 (lymphocyte percent not calculated), hemoglobin 14.4, and platelets of 168.  He is concerned because his bloodwork from 8/2018 showed WBC of 20 and wonders if he might need treatment.  Complains of increased fatigue since December but no fevers, night sweats, LAD, anorexia.\par \par 10/29/19\par He is here for follow up. He had blood work in [place] 25th,  Foalte was 8.5,  WBC was 27, Hgb 13.5, Plts 167, ,  PSA  8.4  from 2018. CMP was normal.  CBC from today is stable, slightly worsened anemia at 12.7 WBC is the same. He feels tired but this is chronic, he lost 8 lbs over about one year. \par \par 8/5/20\par He is here for follow up. he ahd CBC done on 7/31 in [facility]. WBC 25, Plts 129, Hgb 12.6 He has no complaints. He lost about 10 lbs. \par \par 10/7/2020\par Patient is here for a follow-up visit for CLL, accompanied by his daughter.  He is feeling well but states that he has been more fatigued lately and had an episode of night sweats last week which have since resolved.  Most recent CBC is stable with mild leukocytosis.  Patient denies fever, chills, nausea, vomiting, new palpable adenopathy or bleeding.\par \par 1/20/21\par HE is here for follow  up . Since last visit he had CT C/A/P 10/2020 [facility]: Mild adenopathy in chest and axillla, L Splenomegally with 14.4 cm spleen, he has adenopathy but largest was 3.1 cm  cm in left external iliac node. He had CBC fron on 1/7/21: WBC was 23, Hgb 12.3, Plts 157,   UA is normal. CM is normal. Essentially a stable CBC.   He had a UTI end of December with fevers and night sweats and also his testicle was swollen. He had 10 days of IM and IV antibiotics by Dr. Roceh and his sweats were bothersome and he also lost some with  that time. Since than he has gained weight back and only reports 2 mild night sweats in last 2 weeks.  His last UTI was over 20  years back. \par \par 4/28/21\par He feels well. States night sweats are now maybe at most once a month. No other complaints. CBC is stable \par \par 10/27/21\par Patient is here for a follow-up visit for CLL, accompanied by his daughter.  He is feeling well but states that he has been more fatigued lately and had an episode of night sweats last week which are intermittent.  Most recent CBC is stable with mild leukocytosis with WBC 28.  Patient denies fever, chills, nausea, vomiting, new palpable adenopathy or bleeding.  [facility] and WBC was 30.7 and B12 was on the lower end of normal so he started B12 supplement.  \par [facility] ([facility]) WBC 30.7, Hgb 12.9, , Cr 0.9, Vit B12 is 254, eGFR 87, PSA 2.83, TSH 3.2, Folate 9.9\par \par 4/20/22\par He is here for follow up. A few months back he had fatigue and sweats that resolved. He feels fine now. No adenopahty or B symptoms CBC is stable form today. with stable WBC

## 2022-04-20 NOTE — ASSESSMENT
[FreeTextEntry1] : #CLL, Gracia stage II, mild splenomegaly, with adenopathy   on observation for nearly 10 years now.  WBC is stable, hgb is stable.   complaint of fatigue  and had night sweats that are now subsiding form recent UTI. \par - hgb is stable and is near 12 , on the macrocytic side with lwo to normal B12 and folate, may be related to his CLL, but would not qualify to treat him yet. He has some weight loss but has gained weight back and has been stable.\par - c/w close observation for now, he knows to call us if he develops new or worsening of night sweats or associated symptoms\par - if has another infection will check  his immunoglobulins last one was in 2018 and IgG was normal. \par \par #elevated  PSA , PSA 2.83 from 09/2021\par - [facility] [place] ; reviewed most recent lab workup\par - now sees Dr. Roche\par \par #  He had skin cancer removed from his nose. \par - follows with DERM\par \par RTC in 6 months with CBC , sooner if needed

## 2022-04-21 LAB
ALBUMIN SERPL ELPH-MCNC: 4.3 G/DL
ALP BLD-CCNC: 68 U/L
ALT SERPL-CCNC: 14 U/L
ANION GAP SERPL CALC-SCNC: 12 MMOL/L
AST SERPL-CCNC: 15 U/L
BILIRUB SERPL-MCNC: 0.2 MG/DL
BUN SERPL-MCNC: 19 MG/DL
CALCIUM SERPL-MCNC: 9 MG/DL
CHLORIDE SERPL-SCNC: 108 MMOL/L
CO2 SERPL-SCNC: 24 MMOL/L
CREAT SERPL-MCNC: 0.9 MG/DL
EGFR: 86 ML/MIN/1.73M2
GLUCOSE SERPL-MCNC: 110 MG/DL
LDH SERPL-CCNC: 173 U/L
POTASSIUM SERPL-SCNC: 4.4 MMOL/L
PROT SERPL-MCNC: 6.1 G/DL
SODIUM SERPL-SCNC: 144 MMOL/L

## 2022-04-25 ENCOUNTER — TRANSCRIPTION ENCOUNTER (OUTPATIENT)
Age: 82
End: 2022-04-25

## 2022-07-29 ENCOUNTER — APPOINTMENT (OUTPATIENT)
Dept: HEMATOLOGY ONCOLOGY | Facility: CLINIC | Age: 82
End: 2022-07-29

## 2022-07-29 ENCOUNTER — LABORATORY RESULT (OUTPATIENT)
Age: 82
End: 2022-07-29

## 2022-07-29 VITALS
HEART RATE: 56 BPM | BODY MASS INDEX: 27.06 KG/M2 | RESPIRATION RATE: 18 BRPM | HEIGHT: 70 IN | DIASTOLIC BLOOD PRESSURE: 63 MMHG | WEIGHT: 189 LBS | TEMPERATURE: 97.1 F | SYSTOLIC BLOOD PRESSURE: 119 MMHG

## 2022-07-29 PROCEDURE — 99214 OFFICE O/P EST MOD 30 MIN: CPT

## 2022-08-01 LAB
ALBUMIN SERPL ELPH-MCNC: 4.1 G/DL
ALP BLD-CCNC: 67 U/L
ALT SERPL-CCNC: 9 U/L
ANION GAP SERPL CALC-SCNC: 5 MMOL/L
AST SERPL-CCNC: 13 U/L
BILIRUB SERPL-MCNC: 0.3 MG/DL
BUN SERPL-MCNC: 20 MG/DL
CALCIUM SERPL-MCNC: 8.9 MG/DL
CHLORIDE SERPL-SCNC: 103 MMOL/L
CO2 SERPL-SCNC: 26 MMOL/L
CREAT SERPL-MCNC: 1 MG/DL
EGFR: 76 ML/MIN/1.73M2
GLUCOSE SERPL-MCNC: 133 MG/DL
HCT VFR BLD CALC: 33.9 %
HGB BLD-MCNC: 11.3 G/DL
LDH SERPL-CCNC: 222 U/L
MCHC RBC-ENTMCNC: 33.3 G/DL
MCHC RBC-ENTMCNC: 34.5 PG
MCV RBC AUTO: 103.4 FL
PLATELET # BLD AUTO: 128 K/UL
PMV BLD: 9.9 FL
POTASSIUM SERPL-SCNC: 4.4 MMOL/L
PROT SERPL-MCNC: 5.7 G/DL
RBC # BLD: 3.28 M/UL
RBC # FLD: 13.1 %
SODIUM SERPL-SCNC: 134 MMOL/L
WBC # FLD AUTO: 26.35 K/UL

## 2022-08-01 NOTE — HISTORY OF PRESENT ILLNESS
[de-identified] : This is a 77 year old male here to establish care. He has a history of asymptomatic CLL.  His blood work from 10/27/2015, is showing a white count of 16.8 with lymphocytosis.  Hemoglobin was 13.8 and platelets were 226.  Folate, T4, TSH, vitamin D were normal.  Calcium was 9.1.  Normal LFTs. flow cytometry positive CD5 CD 23 ZAP 70 negative CD38 cw CLL normal FISH\par \par Blood work from last visit on 2/2017 showed WBC of 19.7, lymphs 82%, HgB 13.5 and plts of 190 with MCV 97.5.\par \par He has no complaints.\par \par Had a colonoscopy 2017 states it was normal and does not want another one.\par \par \par  [de-identified] : 12/21/17\par CC" Her for follow up for my CLL"\par He is doing well. Recovering from a cold. [facility]  [facility] showed WBc 23.5, HgB 13.7, Pts 160 CMP b12 345 and foalte was >24, LFT were mormal. His HgB fromt today shows a fall to 12.7. MCV is a bit higher. \par \par He has no B symptoms.\par \par 8/28/18\par He is here for follow up. He states he is doing well. He has no complaints. [facility] not get [facility] from [facility].\par \par 2/19/19\par Patient presents for 6 month follow up [facility].  CBC showed WBC 30.6 (lymphocyte percent not calculated), hemoglobin 14.4, and platelets of 168.  He is concerned because his bloodwork from 8/2018 showed WBC of 20 and wonders if he might need treatment.  Complains of increased fatigue since December but no fevers, night sweats, LAD, anorexia.\par \par 10/29/19\par He is here for follow up. He had blood work in [place] 25th,  Foalte was 8.5,  WBC was 27, Hgb 13.5, Plts 167, ,  PSA  8.4  from 2018. CMP was normal.  CBC from today is stable, slightly worsened anemia at 12.7 WBC is the same. He feels tired but this is chronic, he lost 8 lbs over about one year. \par \par 8/5/20\par He is here for follow up. he ahd CBC done on 7/31 in [facility]. WBC 25, Plts 129, Hgb 12.6 He has no complaints. He lost about 10 lbs. \par \par 10/7/2020\par Patient is here for a follow-up visit for CLL, accompanied by his daughter.  He is feeling well but states that he has been more fatigued lately and had an episode of night sweats last week which have since resolved.  Most recent CBC is stable with mild leukocytosis.  Patient denies fever, chills, nausea, vomiting, new palpable adenopathy or bleeding.\par \par 1/20/21\par HE is here for follow  up . Since last visit he had CT C/A/P 10/2020 [facility]: Mild adenopathy in chest and axillla, L Splenomegally with 14.4 cm spleen, he has adenopathy but largest was 3.1 cm  cm in left external iliac node. He had CBC fron on 1/7/21: WBC was 23, Hgb 12.3, Plts 157,   UA is normal. CM is normal. Essentially a stable CBC.   He had a UTI end of December with fevers and night sweats and also his testicle was swollen. He had 10 days of IM and IV antibiotics by Dr. Roche and his sweats were bothersome and he also lost some with  that time. Since than he has gained weight back and only reports 2 mild night sweats in last 2 weeks.  His last UTI was over 20  years back. \par \par 4/28/21\par He feels well. States night sweats are now maybe at most once a month. No other complaints. CBC is stable \par \par 10/27/21\par Patient is here for a follow-up visit for CLL, accompanied by his daughter.  He is feeling well but states that he has been more fatigued lately and had an episode of night sweats last week which are intermittent.  Most recent CBC is stable with mild leukocytosis with WBC 28.  Patient denies fever, chills, nausea, vomiting, new palpable adenopathy or bleeding.  [facility] and WBC was 30.7 and B12 was on the lower end of normal so he started B12 supplement.  \par [facility] ([facility] - [facility]) WBC 30.7, Hgb 12.9, , Cr 0.9, Vit B12 is 254, eGFR 87, PSA 2.83, TSH 3.2, Folate 9.9\par \par 4/20/22\par He is here for follow up. A few months back he had fatigue and sweats that resolved. He feels fine now. No adenopahty or B symptoms CBC is stable form today. with stable WBC \par \par 7/29/22\par HE is here for follow up.  Continues to loose weight. He has night sweats that are a bit worse. [facility] WBC 28, Hgb 12 MCV stable at 107, plts 126 had a CBC today that showed stable counts with white blood cell count of 26, hemoglobin of 11.3 and platelets of 128.  I did not feel any adenopathy on exam.

## 2022-08-01 NOTE — REVIEW OF SYSTEMS
[Night Sweats] : night sweats [Constipation] : constipation [Negative] : Allergic/Immunologic [Fever] : no fever [Chills] : no chills [Fatigue] : no fatigue [Recent Change In Weight] : ~T recent weight change [Dysphagia] : no dysphagia [Chest Pain] : no chest pain [Lower Ext Edema] : no lower extremity edema [Shortness Of Breath] : no shortness of breath [SOB on Exertion] : no shortness of breath during exertion [Abdominal Pain] : no abdominal pain [Diarrhea] : no diarrhea [Joint Pain] : no joint pain [Skin Rash] : no skin rash [Easy Bleeding] : no tendency for easy bleeding

## 2022-08-01 NOTE — ASSESSMENT
[FreeTextEntry1] : #CLL, Gracia stage II, mild splenomegaly, with adenopathy. mild anemia and thrombocytopenia    on observation for many years .\par -.  Although his CBC i is stable and no obvious adenopathy on exam  he continues to have weight loss and night sweats and I explained to the patient this may be a reason to start treatment but at this time he wishes  to continue with observation so we ordered CT chest abdomen and pelvis to evaluate for worsening of adenopathy.  He had previous CAT scans done in October of 2020.  Night sweats and weight loss has been going on for quite some time just with the symptoms on may be this is a gray area or the worsening of his symptoms may be cannot be considered active disease\par -We did discuss for him to review literature and treatment and multiple references were provided specifically NCCN or leukemia lymphoma Society so he could take read about BTK inhibitors and Bcl-2 inhibitors and anti-CD20 therapy\par -Prior to initiation of treatment we will also repeat FISH, TP53 status (NG),  flow cytometry, cytogenetics, and IGHV status  for new baseline and will likely draw with next visit.\par \par #elevated  PSA , PSA 2.83 from 09/2021\par - [facility] [place] ; reviewed most recent lab workup\par - now sees Dr. Roche\par \par #  He had skin cancer removed from his nose. \par - follows with DERM\par \par RTC in  6 weeks once CTs are back

## 2022-09-02 ENCOUNTER — APPOINTMENT (OUTPATIENT)
Dept: HEMATOLOGY ONCOLOGY | Facility: CLINIC | Age: 82
End: 2022-09-02

## 2022-09-02 ENCOUNTER — LABORATORY RESULT (OUTPATIENT)
Age: 82
End: 2022-09-02

## 2022-09-02 VITALS
BODY MASS INDEX: 26.34 KG/M2 | SYSTOLIC BLOOD PRESSURE: 105 MMHG | OXYGEN SATURATION: 98 % | WEIGHT: 184 LBS | HEART RATE: 78 BPM | HEIGHT: 70 IN | DIASTOLIC BLOOD PRESSURE: 58 MMHG | TEMPERATURE: 97.5 F

## 2022-09-02 DIAGNOSIS — R61 GENERALIZED HYPERHIDROSIS: ICD-10-CM

## 2022-09-02 LAB
ALBUMIN SERPL ELPH-MCNC: 4.2 G/DL
ALP BLD-CCNC: 65 U/L
ALT SERPL-CCNC: 12 U/L
ANION GAP SERPL CALC-SCNC: 6 MMOL/L
AST SERPL-CCNC: 16 U/L
BILIRUB SERPL-MCNC: 0.3 MG/DL
BUN SERPL-MCNC: 16 MG/DL
CALCIUM SERPL-MCNC: 9 MG/DL
CHLORIDE SERPL-SCNC: 106 MMOL/L
CO2 SERPL-SCNC: 29 MMOL/L
CREAT SERPL-MCNC: 0.8 MG/DL
EGFR: 89 ML/MIN/1.73M2
GLUCOSE SERPL-MCNC: 96 MG/DL
HCT VFR BLD CALC: 35.9 %
HGB BLD-MCNC: 12 G/DL
LDH SERPL-CCNC: 207 U/L
MCHC RBC-ENTMCNC: 33.4 G/DL
MCHC RBC-ENTMCNC: 34.1 PG
MCV RBC AUTO: 102 FL
PLATELET # BLD AUTO: 93 K/UL
PMV BLD: 10.2 FL
POTASSIUM SERPL-SCNC: 4.4 MMOL/L
PROT SERPL-MCNC: 6 G/DL
RBC # BLD: 3.52 M/UL
RBC # FLD: 13.5 %
SODIUM SERPL-SCNC: 141 MMOL/L
WBC # FLD AUTO: 30.39 K/UL

## 2022-09-02 PROCEDURE — 99214 OFFICE O/P EST MOD 30 MIN: CPT

## 2022-09-06 PROBLEM — R61 NIGHT SWEATS: Status: ACTIVE | Noted: 2020-10-07

## 2022-09-06 NOTE — REVIEW OF SYSTEMS
[Night Sweats] : night sweats [Dry Eyes] : dryness of the eyes [Negative] : Endocrine [Fever] : no fever [Chills] : no chills [Fatigue] : no fatigue [Recent Change In Weight] : ~T no recent weight change [Dysphagia] : no dysphagia [Chest Pain] : no chest pain [Lower Ext Edema] : no lower extremity edema [Shortness Of Breath] : no shortness of breath [SOB on Exertion] : no shortness of breath during exertion [Abdominal Pain] : no abdominal pain [Constipation] : no constipation [Diarrhea] : no diarrhea [Joint Pain] : no joint pain [Skin Rash] : no skin rash [Easy Bleeding] : no tendency for easy bleeding [FreeTextEntry3] : 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

## 2022-09-06 NOTE — HISTORY OF PRESENT ILLNESS
[de-identified] : 01/20/2021 This is a 77 year old male here to establish care. He has a history of asymptomatic CLL. His blood work from 10/27/2015, is showing a white count of 16.8 with lymphocytosis. Hemoglobin was 13.8 and platelets were 226. Folate, T4, TSH, vitamin D were normal. Calcium was 9.1  Normal LFTs. Flow cytometry positive CD5 CD 23 ZAP 70 negative CD38 cw CLL normal FISH.\par \par Blood work from last visit on 02/2017 showed WBC of 19.7, lymphs 82%, HgB 13.5 and plts of 190 with MCV 97.5.\par \par He has no complaints.\par \par Had a colonoscopy 2017 states it was normal and does not want another one.\par \par \par  [de-identified] : 12/21/17\par CC" Her for follow up for my CLL"\par He is doing well. Recovering from a cold. [facility] WBc 23.5, HgB 13.7, Pts 160 CMP b12 345 and foalte was >24, LFT were normal. His HgB from today shows a fall to 12.7. MCV is a bit higher. \par \par He has no B symptoms.\par \par 8/28/18\par He is here for follow up. He states he is doing well. He has no complaints. [facility] not [facility] work [facility].\par \par 2/19/19\par Patient presents for 6 month follow up [facility].  CBC showed WBC 30.6 (lymphocyte percent not calculated), hemoglobin 14.4, and platelets of 168.  He is concerned because his bloodwork from 8/2018 showed WBC of 20 and wonders if he might need treatment.  Complains of increased fatigue since December but no fevers, night sweats, LAD, anorexia.\par \par 10/29/19\par He is here for follow up. He had blood work in September 25th,  Folate was 8.5,  WBC was 27, Hgb 13.5, Plts 167, ,  PSA  8.4  from 2018. CMP was normal.  CBC from today is stable, slightly worsened anemia at 12.7 WBC is the same. He feels tired but this is chronic, he lost 8 lbs over about one year. \par \par 8/5/20\par He is here for follow up. he had CBC done on 7/31 in [facility]. WBC 25, Plts 129, Hgb 12.6 He has no complaints. He lost about 10 lbs. \par \par 10/7/2020\par Patient is here for a follow-up visit for CLL, accompanied by his daughter.  He is feeling well but states that he has been more fatigued lately and had an episode of night sweats last week which have since resolved.  Most recent CBC is stable with mild leukocytosis.  Patient denies fever, chills, nausea, vomiting, new palpable adenopathy or bleeding.\par \par 1/20/21\par HE is here for follow  up . Since last visit he had CT C/A/P 10/2020 [facility]: Mild adenopathy in chest and axilla, L Splenomegaly with 14.4 cm spleen, he has adenopathy but largest was 3.1 cm  cm in left external iliac node. He had CBC fron on 1/7/21: WBC was 23, Hgb 12.3, Plts 157,   UA is normal. CM is normal. Essentially a stable CBC.   He had a UTI end of December with fevers and night sweats and also his testicle was swollen. He had 10 days of IM and IV antibiotics by Dr. Roche and his sweats were bothersome and he also lost some with  that time. Since than he has gained weight back and only reports 2 mild night sweats in last 2 weeks.  His last UTI was over 20  years back. \par \par 4/28/21\par He feels well. States night sweats are now maybe at most once a month. No other complaints. CBC is stable \par \par 10/27/21\par Patient is here for a follow-up visit for CLL, accompanied by his daughter.  He is feeling well but states that he has been more fatigued lately and had an episode of night sweats last week which are intermittent.  Most recent CBC is stable with mild leukocytosis with WBC 28.  Patient denies fever, chills, nausea, vomiting, new palpable adenopathy or bleeding.  [facility] and WBC [facility] 30.7 and B12 was on the lower end of normal so he started B12 supplement.  \par [facility] ([facility] - [facility]) WBC 30.7, Hgb 12.9, , Cr 0.9, Vit B12 is 254, eGFR 87, PSA 2.83, TSH 3.2, Folate 9.9\par \par 4/20/22\par He is here for follow up. A few months back he had fatigue and sweats that resolved. He feels fine now. No adenopathy or B symptoms CBC is stable form today. with stable WBC \par \par 7/29/22\par HE is here for follow up.  Continues to loose weight. He has night sweats that are a bit worse. [facility] WBC 28, Hgb 12 MCV stable at 107, plts 126 had a CBC today that showed stable counts with white blood cell count of 26, hemoglobin of 11.3 and platelets of 128.  I did not feel any adenopathy on exam.\par \par 09/02/2022 accompanied by his wife and is here to discuss treatment for CLL progression. Now with more dry cough and increasing night sweats and  appetite decrease. 09/01/2022 had tele visit [place] [address] [phone] who recommended PET prior to treatment initiation and was suggested 3 Rx..\par He also had CT C/A/P that showed

## 2022-09-06 NOTE — ASSESSMENT
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 He had previous CAT scans done in October of 2020.  Night sweats and weight loss has been going on for quite some time just with the symptoms on may be this is a gray area or the worsening of his symptoms may be cannot be considered active disease.\par \par - Review literature and treatment and multiple references were discussed and provided specifically NCCN or leukemia lymphoma Society so he could take read about BTK inhibitors and Bcl-2 inhibitors and anti-CD20 therapy.\par \par - Prior to initiation of treatment we will also repeat FISH, TP53 status (NG), flow cytometry, cytogenetics, and IGHV status for new baseline and this was drawn today \par \par 09/01/2022 had tele visit with Dr. Monica De La Rosa at [address] [address] [phone] - recommended PET prior to treatment initiation and as per patient aslo recommneded Acalbrutinib \par \par - we agreed  to start acalabrutinib - he and his wife wants to discuss initiation of it with Dr. De La Rosa.\par \par # elevated PSA , PSA 2.83 from 09/2021\par - [facility] [place]; now sees Dr. Roche.\par \par # He had skin cancer removed from his nose. \par - follows with DERM.\par \par PLAN:\par \par - the patient wishes to postpone initiation of acalabrutinib 100 mg PO Q12H till his discussion with Dr. De La Rosa. \par - collect Hep panel next visit.\par - monitor CBC and CMP Q4W while on treatment.\par \par F/U in post PET.CT in februalry to finalize treatment plan and to order   acalabrutinib

## 2022-09-06 NOTE — END OF VISIT
[FreeTextEntry3] : I was physically present for the key portions of the evaluation and management service provided.  I agree with the history and physical, and plan which I have reviewed and edited where appropriate.\par \brian Avalos returns for follow-up today.  He continues to be symptomatic from his CLL specifically daily night sweats and weight loss.  His CBC is essentially stable just slightly worsening thrombocytopenia although borderline, LDH is normal, no adenopathy on exam, and his repeat CT C/A/P on 8/16/2022 only showed slightly worsening adenopathy in chest and abdomen  and slightly worsening splenomegaly for example spleen is 15 cm.  And largest lymph node is 17 mm was previously 10 mm. He did see Dr. Monica De La Rosa at [facility] I do not have the records from that visit but patient explained that she recommended a PET CT scan and to consider starting acalabrutinib if there is Teran transformation which I explained to the patient is not likely given normal LDH and only slightly worsening adenopathy since the last scan in 2020 but will check PET/CT scan patient will follow-up at the end of September for results and will likely initiate acalabrutinib since this is reasonable and also his preference at this point and will information about the medication provided in detail but previously treatment options were discussed in detail with patient.  We will also send off fish and IGHV status for his CLL although unlikely to change any management at this point in time and NGS for TP 53 as well in future likely with [facility]

## 2022-09-13 LAB — IGVH MUTATION ANALYSIS: NEGATIVE

## 2022-09-30 ENCOUNTER — APPOINTMENT (OUTPATIENT)
Dept: HEMATOLOGY ONCOLOGY | Facility: CLINIC | Age: 82
End: 2022-09-30

## 2022-10-11 ENCOUNTER — OUTPATIENT (OUTPATIENT)
Dept: OUTPATIENT SERVICES | Facility: HOSPITAL | Age: 82
LOS: 1 days | Discharge: HOME | End: 2022-10-11

## 2022-10-11 ENCOUNTER — APPOINTMENT (OUTPATIENT)
Dept: HEMATOLOGY ONCOLOGY | Facility: CLINIC | Age: 82
End: 2022-10-11

## 2022-10-11 VITALS
HEART RATE: 68 BPM | DIASTOLIC BLOOD PRESSURE: 64 MMHG | RESPIRATION RATE: 16 BRPM | TEMPERATURE: 98 F | SYSTOLIC BLOOD PRESSURE: 102 MMHG | BODY MASS INDEX: 26.26 KG/M2 | WEIGHT: 183 LBS

## 2022-10-11 DIAGNOSIS — R61 GENERALIZED HYPERHIDROSIS: ICD-10-CM

## 2022-10-11 DIAGNOSIS — C91.10 CHRONIC LYMPHOCYTIC LEUKEMIA OF B-CELL TYPE NOT HAVING ACHIEVED REMISSION: ICD-10-CM

## 2022-10-11 PROCEDURE — 99214 OFFICE O/P EST MOD 30 MIN: CPT

## 2022-10-11 NOTE — HISTORY OF PRESENT ILLNESS
[de-identified] : 01/20/2021 This is a 77 year old male here to establish care. He has a history of asymptomatic CLL. His blood work from 10/27/2015, is showing a white count of 16.8 with lymphocytosis. Hemoglobin was 13.8 and platelets were 226. Folate, T4, TSH, vitamin D were normal. Calcium was 9.1  Normal LFTs. Flow cytometry positive CD5 CD 23 ZAP 70 negative CD38 cw CLL normal FISH.\par \par Blood work from last visit on 02/2017 showed WBC of 19.7, lymphs 82%, HgB 13.5 and plts of 190 with MCV 97.5.\par \par He has no complaints.\par \par Had a colonoscopy 2017 states it was normal and does not want another one.\par \par \par  [de-identified] : 12/21/17\par CC" Her for follow up for my CLL"\par He is doing well. Recovering from a cold. [facility] showed WBc 23.5, HgB 13.7, Pts 160 CMP b12 345 and foalte was >24, LFT were normal. His HgB from today shows a fall to 12.7. MCV is a bit higher. \par \par He has no B symptoms.\par \par 8/28/18\par He is here for follow up. He states he is doing well. He has no complaints. [facility] did not get [facility] work from [facility].\par \par 2/19/19\par Patient presents for 6 month follow up [facility].  CBC showed WBC 30.6 (lymphocyte percent not calculated), hemoglobin 14.4, and platelets of 168.  He is concerned because his bloodwork from 8/2018 showed WBC of 20 and wonders if he might need treatment.  Complains of increased fatigue since December but no fevers, night sweats, LAD, anorexia.\par \par 10/29/19\par He is here for follow up. He had blood work in September 25th,  Folate was 8.5,  WBC was 27, Hgb 13.5, Plts 167, ,  PSA  8.4  from 2018. CMP was normal.  CBC from today is stable, slightly worsened anemia at 12.7 WBC is the same. He feels tired but this is chronic, he lost 8 lbs over about one year. \par \par 8/5/20\par He is here for follow up. he had CBC done on 7/31 in [facility]. WBC 25, Plts 129, Hgb 12.6 He has no complaints. He lost about 10 lbs. \par \par 10/7/2020\par Patient is here for a follow-up visit for CLL, accompanied by his daughter.  He is feeling well but states that he has been more fatigued lately and had an episode of night sweats last week which have since resolved.  Most recent CBC is stable with mild leukocytosis.  Patient denies fever, chills, nausea, vomiting, new palpable adenopathy or bleeding.\par \par 1/20/21\par HE is here for follow  up . Since last visit he had CT C/A/P 10/2020 [facility]: Mild adenopathy in chest and axilla, L Splenomegaly with 14.4 cm spleen, he has adenopathy but largest was 3.1 cm  cm in left external iliac node. He had CBC fron on 1/7/21: WBC was 23, Hgb 12.3, Plts 157,   UA is normal. CM is normal. Essentially a stable CBC.   He had a UTI end of December with fevers and night sweats and also his testicle was swollen. He had 10 days of IM and IV antibiotics by Dr. Roche and his sweats were bothersome and he also lost some with  that time. Since than he has gained weight back and only reports 2 mild night sweats in last 2 weeks.  His last UTI was over 20  years back. \par \par 4/28/21\par He feels well. States night sweats are now maybe at most once a month. No other complaints. CBC is stable \par \par 10/27/21\par Patient is here for a follow-up visit for CLL, accompanied by his daughter.  He is feeling well but states that he has been more fatigued lately and had an episode of night sweats last week which are intermittent.  Most recent CBC is stable with mild leukocytosis with WBC 28.  Patient denies fever, chills, nausea, vomiting, new palpable adenopathy or bleeding.  [facility] and WBC was 30.7 and B12 was on the lower end of normal so he started B12 supplement.  \par LAB[facility] ([facility].2021 - [facility]) WBC 30.7, Hgb 12.9, , Cr 0.9, Vit B12 is 254, eGFR 87, PSA 2.83, TSH 3.2, Folate 9.9\par \par 4/20/22\par He is here for follow up. A few months back he had fatigue and sweats that resolved. He feels fine now. No adenopathy or B symptoms CBC is stable form today. with stable WBC \par \par 7/29/22\par HE is here for follow up.  Continues to loose weight. He has night sweats that are a bit worse. [facility] WBC 28, Hgb 12 MCV stable at 107, plts 126 had a CBC today that showed stable counts with white blood cell count of 26, hemoglobin of 11.3 and platelets of 128.  I did not feel any adenopathy on exam.\par \par 09/02/2022 accompanied by his wife and is here to discuss treatment for CLL progression. Now with more dry cough and increasing night sweats and  appetite decrease. 09/01/2022 had tele visit with [place] [address] [phone] who recommended PET prior to treatment initiation and was suggested 3 Rx..\par He also had CT C/A/P that showed progressive adenopathy in the retroperitoneal, mild progressive splenomegaly, hepatomegaly as on prior.  Spleen was approximately\par 15 cm in size the largest lymph node was approximately 1.7 cm in size.  There was also progressive mediastinal and axillary adenopathy.  The largest lymph node was about 14 mm\par \par 10/11/2022\par He is here for follow up. Since last visit he had PET.CT that did not show any areas for concern for Teran transformation. He continued to have night sweats and he saw Dr. De La Rosa last week for follow up and as per patient it was agreed to start Treatment with Acalabrutinib.\par \par [facility] that showed white blood cell count of 34.6 hemoglobin 12.4 platelets of 111 B12 was 1218 CMP was normal.\par

## 2022-10-11 NOTE — ASSESSMENT
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 He had previous CAT scans done in October of 2020.  Night sweats and weight loss has been going on for quite some time just with the symptoms on may be this is a gray area or the worsening of his symptoms may be cannot be considered active disease.\par \par - Review literature and treatment and multiple references were discussed and provided specifically NCCN or leukemia lymphoma Society so he could take read about BTK inhibitors and Bcl-2 inhibitors and anti-CD20 therapy.\par \par - Prior to initiation of treatment we will also repeat FISH, TP53 status (NG), flow cytometry, cytogenetics, and IGHV status for new baseline and this was drawn today on 9/2022 that showed FISH  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 Monica De La Rosa at [place] [id number] [phone] - recommended PET prior to treatment initiation and as per patient aslo recommneded Acalbrutinib \par \par -PET/CT had no concerning signs for Teran transformation \par \par \par # elevated PSA , PSA 2.83 from 09/2021\par - [facility] [place]; now sees Dr. Roche.\par \par # He had skin cancer removed from his nose. \par - follows with DERM.\par \par PLAN:\par \par - garcia  start  acalabrutinib 100 mg PO Q12H he is aware of side effects \par - collect Hep panel next visit.\par - monitor CBC and CMP Q4W while on treatment and will see valerio lieberman 4 weeks\par -we spoke about a possible colonoscopy in the future given the PET/CT findings  \par \par

## 2022-10-11 NOTE — REVIEW OF SYSTEMS
[Fever] : no fever [Chills] : no chills [Night Sweats] : night sweats [Fatigue] : no fatigue [Recent Change In Weight] : ~T no recent weight change [Dry Eyes] : dryness of the eyes [Dysphagia] : no dysphagia [Chest Pain] : no chest pain [Lower Ext Edema] : no lower extremity edema [Shortness Of Breath] : no shortness of breath [SOB on Exertion] : no shortness of breath during exertion [Abdominal Pain] : no abdominal pain [Constipation] : no constipation [Diarrhea] : no diarrhea [Joint Pain] : no joint pain [Skin Rash] : no skin rash [Easy Bleeding] : no tendency for easy bleeding [Negative] : Allergic/Immunologic [FreeTextEntry3] : 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

## 2022-10-13 RX ORDER — ACALABRUTINIB 100 MG/1
100 TABLET, FILM COATED ORAL
Qty: 60 | Refills: 3 | Status: DISCONTINUED | COMMUNITY
Start: 2022-10-11 | End: 2022-10-13

## 2022-10-20 ENCOUNTER — NON-APPOINTMENT (OUTPATIENT)
Age: 82
End: 2022-10-20

## 2022-10-25 RX ORDER — IBRUTINIB 140 MG/1
140 CAPSULE ORAL DAILY
Qty: 90 | Refills: 5 | Status: DISCONTINUED | COMMUNITY
Start: 2022-10-13 | End: 2022-10-25

## 2022-10-26 ENCOUNTER — NON-APPOINTMENT (OUTPATIENT)
Age: 82
End: 2022-10-26

## 2022-10-26 RX ORDER — FINASTERIDE 5 MG/1
5 TABLET, FILM COATED ORAL DAILY
Refills: 0 | Status: ACTIVE | COMMUNITY

## 2022-10-26 RX ORDER — PNV NO.95/FERROUS FUM/FOLIC AC 28MG-0.8MG
TABLET ORAL DAILY
Refills: 0 | Status: ACTIVE | COMMUNITY

## 2022-10-26 RX ORDER — TAMSULOSIN HYDROCHLORIDE 0.4 MG/1
0.4 CAPSULE ORAL
Refills: 0 | Status: ACTIVE | COMMUNITY

## 2022-11-07 NOTE — END OF VISIT
Ongoing SW/CM Assessment/Plan of Care Note     See SW/CM flowsheets for goals and other objective data.    Patient/Family discharge goal (s):  Goal #1: Psychosocial needs assessed          PT Recommendation:     Recommendation for Discharge: PT IL: Patient requires  intermittent assistance to perform mobility and/or ADLs safely, Patient is appropriate for Physical Therapy 1-3 times per week    OT Recommendation:     Recommendations for Discharge: OT IL: Patient does not require assistance to perform mobility and/or ADLs safely    SLP Recommendation:       Disposition:       Progress note:   [facility]15 [facility] today.   8:19 am- Left voicemail with Rachel at [facility] to see if they can update [facility] to accept. Rachel will have Akbar call back when he gets into work at 8:30am.  9:58 am Spoke with Shannan at [facility] who states she is looking to a RN to hook up patient today, and will call back to confirm.   10:01am- Spoke with Rachel at [facility] who verbally confirmed SOC for 11/8. She will also try to update [facility]IN with acceptance. She requested med rec, and \"discharge summary\" sent over via ecin also.  10:23am- Spoke with Shannan from [facility] who confirmed RN for hook up today at 4pm. Shannan would like a current weight today. Informed bedside nurse who will weight the patient now.   11:47am- Spoke with Shannan at [facility] and provided her with patient's weight, per her request.    12:31 sent Conrad updated orders, discharge summary via ECIN   12:56 Spoke with the pharmacist at [facility] and sent RX from 10/31 via ecin             [FreeTextEntry3] : He came in due to recent night sweats and not feeling well. THis has resolved. Likely had a viral illness. No adenopathy on exam or significant splenomegaly.  CBC is stable. Will check CT C/A/P to make sure there is no progression of CLL since he has had night sweats in the past s well. Will call him with results.

## 2022-11-14 ENCOUNTER — APPOINTMENT (OUTPATIENT)
Dept: HEMATOLOGY ONCOLOGY | Facility: CLINIC | Age: 82
End: 2022-11-14

## 2022-11-14 VITALS
BODY MASS INDEX: 26.63 KG/M2 | SYSTOLIC BLOOD PRESSURE: 120 MMHG | DIASTOLIC BLOOD PRESSURE: 68 MMHG | TEMPERATURE: 97.4 F | RESPIRATION RATE: 16 BRPM | HEART RATE: 87 BPM | HEIGHT: 70 IN | WEIGHT: 186 LBS

## 2022-11-14 LAB
ALBUMIN SERPL ELPH-MCNC: 4.4 G/DL
ALP BLD-CCNC: 57 U/L
ALT SERPL-CCNC: 12 U/L
ANION GAP SERPL CALC-SCNC: 11 MMOL/L
AST SERPL-CCNC: 13 U/L
BASOPHILS # BLD AUTO: 0.08 K/UL
BASOPHILS NFR BLD AUTO: 0.2 %
BILIRUB SERPL-MCNC: 0.3 MG/DL
BUN SERPL-MCNC: 15 MG/DL
CALCIUM SERPL-MCNC: 9 MG/DL
CHLORIDE SERPL-SCNC: 102 MMOL/L
CO2 SERPL-SCNC: 27 MMOL/L
CREAT SERPL-MCNC: 0.8 MG/DL
EGFR: 89 ML/MIN/1.73M2
EOSINOPHIL # BLD AUTO: 0.11 K/UL
EOSINOPHIL NFR BLD AUTO: 0.3 %
GLUCOSE SERPL-MCNC: 103 MG/DL
HCT VFR BLD CALC: 37 %
HGB BLD-MCNC: 11.7 G/DL
IMM GRANULOCYTES NFR BLD AUTO: 0.1 %
LYMPHOCYTES # BLD AUTO: 33.47 K/UL
LYMPHOCYTES NFR BLD AUTO: 90.1 %
MAN DIFF?: NORMAL
MCHC RBC-ENTMCNC: 31.6 G/DL
MCHC RBC-ENTMCNC: 33.5 PG
MCV RBC AUTO: 106 FL
MONOCYTES # BLD AUTO: 0.39 K/UL
MONOCYTES NFR BLD AUTO: 1 %
NEUTROPHILS # BLD AUTO: 3.07 K/UL
NEUTROPHILS NFR BLD AUTO: 8.3 %
PLATELET # BLD AUTO: 128 K/UL
POTASSIUM SERPL-SCNC: 4.4 MMOL/L
PROT SERPL-MCNC: 6.1 G/DL
RBC # BLD: 3.49 M/UL
RBC # FLD: 13.8 %
SODIUM SERPL-SCNC: 140 MMOL/L
WBC # FLD AUTO: 37.16 K/UL

## 2022-11-14 PROCEDURE — 99214 OFFICE O/P EST MOD 30 MIN: CPT

## 2022-11-15 ENCOUNTER — TRANSCRIPTION ENCOUNTER (OUTPATIENT)
Age: 82
End: 2022-11-15

## 2022-11-15 LAB
HBV CORE IGG+IGM SER QL: NONREACTIVE
HBV SURFACE AB SER QL: NONREACTIVE
HBV SURFACE AG SER QL: NONREACTIVE
HCV AB SER QL: NONREACTIVE
HCV S/CO RATIO: 0.14 S/CO
LDH SERPL-CCNC: 140 U/L

## 2022-11-15 NOTE — ASSESSMENT
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 He had previous CAT scans done in October of 2020.  Night sweats and weight loss has been going on for quite some time just with the symptoms on may be this is a gray area or the worsening of his symptoms may be cannot be considered active disease.\par \par - Review literature and treatment and multiple references were discussed and provided specifically NCCN or leukemia lymphoma Society so he could take read about BTK inhibitors and Bcl-2 inhibitors and anti-CD20 therapy.\par \par - Prior to initiation of treatment we will also repeat FISH, TP53 status (NG), flow cytometry, cytogenetics, and IGHV status for new baseline and this was drawn today on 9/2022 that showed FISH  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 Monica De La Rosa at [address] [id number] [phone] - recommended PET prior to treatment initiation and as per patient also recommended Acalbrutinib \par \par -PET/CT had no concerning signs for Teran transformation \par \par \par # elevated PSA , PSA 2.83 from 09/2021\par - [facility] [place]; now sees Dr. Roche.\par \par # He had skin cancer removed from his nose. \par - follows with DERM.\par \par PLAN:\par \par - He was started on acalabrutinib 100 mg PO Q12H he is aware of side effects \par - collect Hep panel next visit.\par - monitor CBC and CMP Q4W while on treatment and will see me in 4 weeks\par - we spoke about a possible colonoscopy in the future given the PET/CT findings  \par \par

## 2022-11-15 NOTE — HISTORY OF PRESENT ILLNESS
[de-identified] : 01/20/2021 This is a 77 year old male here to establish care. He has a history of asymptomatic CLL. His blood work from 10/27/2015, is showing a white count of 16.8 with lymphocytosis. Hemoglobin was 13.8 and platelets were 226. Folate, T4, TSH, vitamin D were normal. Calcium was 9.1  Normal LFTs. Flow cytometry positive CD5 CD 23 ZAP 70 negative CD38 cw CLL normal FISH.\par \par Blood work from last visit on 02/2017 showed WBC of 19.7, lymphs 82%, HgB 13.5 and plts of 190 with MCV 97.5.\par \par He has no complaints.\par \par Had a colonoscopy 2017 states it was normal and does not want another one.\par \par \par  [de-identified] : 12/21/17\par CC" Her for follow up for my CLL"\par He is doing well. Recovering from a cold. [facility] WBc 23.5, HgB 13.7, Pts 160 CMP b12 345 and foalte was >24, LFT were normal. His HgB from today shows a fall to 12.7. MCV is a bit higher. \par \par He has no B symptoms.\par \par 8/28/18\par He is here for follow up. He states he is doing well. He has no complaints. [facility] not get [facility] work [facility].\par \par 2/19/19\par Patient presents for 6 month follow up with [facility].  CBC showed WBC 30.6 (lymphocyte percent not calculated), hemoglobin 14.4, and platelets of 168.  He is concerned because his bloodwork from 8/2018 showed WBC of 20 and wonders if he might need treatment.  Complains of increased fatigue since December but no fevers, night sweats, LAD, anorexia.\par \par 10/29/19\par He is here for follow up. He had blood work in September 25th,  Folate was 8.5,  WBC was 27, Hgb 13.5, Plts 167, ,  PSA  8.4  from 2018. CMP was normal.  CBC from today is stable, slightly worsened anemia at 12.7 WBC is the same. He feels tired but this is chronic, he lost 8 lbs over about one year. \par \par 8/5/20\par He is here for follow up. he had CBC done on 7/31 in [facility]. WBC 25, Plts 129, Hgb 12.6 He has no complaints. He lost about 10 lbs. \par \par 10/7/2020\par Patient is here for a follow-up visit for CLL, accompanied by his daughter.  He is feeling well but states that he has been more fatigued lately and had an episode of night sweats last week which have since resolved.  Most recent CBC is stable with mild leukocytosis.  Patient denies fever, chills, nausea, vomiting, new palpable adenopathy or bleeding.\par \par 1/20/21\par HE is here for follow  up . Since last visit he had CT C/A/P [facility]/2020 [facility]: Mild adenopathy in chest and axilla, L Splenomegaly with 14.4 cm spleen, he has adenopathy but largest was 3.1 cm  cm in left external iliac node. He had CBC fron on 1/7/21: WBC was 23, Hgb 12.3, Plts 157,   UA is normal. CM is normal. Essentially a stable CBC.   He had a UTI end of December with fevers and night sweats and also his testicle was swollen. He had 10 days of IM and IV antibiotics by Dr. Roche and his sweats were bothersome and he also lost some with  that time. Since than he has gained weight back and only reports 2 mild night sweats in last 2 weeks.  His last UTI was over 20  years back. \par \par 4/28/21\par He feels well. States night sweats are now maybe at most once a month. No other complaints. CBC is stable \par \par 10/27/21\par Patient is here for a follow-up visit for CLL, accompanied by his daughter.  He is feeling well but states that he has been more fatigued lately and had an episode of night sweats last week which are intermittent.  Most recent CBC is stable with mild leukocytosis with WBC 28.  Patient denies fever, chills, nausea, vomiting, new palpable adenopathy or bleeding.  [facility] and WBC was 30.7 and B12 was on the lower end of normal so he started B12 supplement.  \par [facility] ([facility]) WBC 30.7, Hgb 12.9, , Cr 0.9, Vit B12 is 254, eGFR 87, PSA 2.83, TSH 3.2, Folate 9.9\par \par 4/20/22\par He is here for follow up. A few months back he had fatigue and sweats that resolved. He feels fine now. No adenopathy or B symptoms CBC is stable form today. with stable WBC \par \par 7/29/22\par HE is here for follow up.  Continues to loose weight. He has night sweats that are a bit worse. [facility] WBC 28, Hgb 12 MCV stable at 107, plts 126 had a CBC today that showed stable counts with white blood cell count of 26, hemoglobin of 11.3 and platelets of 128.  I did not feel any adenopathy on exam.\par \par 09/02/2022 accompanied by his wife and is here to discuss treatment for CLL progression. Now with more dry cough and increasing night sweats and  appetite decrease. 09/01/2022 had tele visit with [place] [address] [phone] who recommended PET prior to treatment initiation and was suggested 3 Rx..\par He also had CT C/A/P that showed progressive adenopathy in the retroperitoneal, mild progressive splenomegaly, hepatomegaly as on prior.  Spleen was approximately\par 15 cm in size the largest lymph node was approximately 1.7 cm in size.  There was also progressive mediastinal and axillary adenopathy.  The largest lymph node was about 14 mm\par \par 10/11/2022\par He is here for follow up. Since last visit he had PET.CT that did not show any areas for concern for Teran transformation. He continued to have night sweats and he saw Dr. De La Rosa last week for follow up and as per patient it was agreed to start Treatment with Acalabrutinib.\par \par [facility] that showed white blood cell count of 34.6 hemoglobin 12.4 platelets of 111 B12 was 1218 CMP was normal.\par \par 11/14/22\par He returns for follow up today. He is on Calquence and so far tolerating. He reports his night sweats have reduced in frequency. He does have some bruising along his arm but we explained that its from the medication. \par

## 2022-11-15 NOTE — REVIEW OF SYSTEMS
[Night Sweats] : night sweats [Dry Eyes] : dryness of the eyes [Negative] : Allergic/Immunologic [Fever] : no fever [Chills] : no chills [Fatigue] : no fatigue [Recent Change In Weight] : ~T no recent weight change [Dysphagia] : no dysphagia [Chest Pain] : no chest pain [Lower Ext Edema] : no lower extremity edema [Shortness Of Breath] : no shortness of breath [SOB on Exertion] : no shortness of breath during exertion [Abdominal Pain] : no abdominal pain [Constipation] : no constipation [Diarrhea] : no diarrhea [Joint Pain] : no joint pain [Skin Rash] : no skin rash [Easy Bleeding] : no tendency for easy bleeding [FreeTextEntry3] : 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

## 2022-11-15 NOTE — CONSULT LETTER
[Dear  ___] : Dear  [unfilled], [Courtesy Letter:] : I had the pleasure of seeing your patient, [unfilled], in my office today. [Please see my note below.] : Please see my note below. [Sincerely,] : Sincerely, [DrJack  ___] : Dr. VALLES [DrJack ___] : Dr. VALLES [FreeTextEntry3] : Maciel

## 2022-11-15 NOTE — PHYSICAL EXAM
[Restricted in physically strenuous activity but ambulatory and able to carry out work of a light or sedentary nature] : Status 1- Restricted in physically strenuous activity but ambulatory and able to carry out work of a light or sedentary nature, e.g., light house work, office work [Normal] : full range of motion and no deformities appreciated [de-identified] : some mild bruising noted on right arm

## 2022-11-15 NOTE — END OF VISIT
[] : Fellow [FreeTextEntry3] : Returns for follow-up today.  Overall he feels better and his night sweats have improved.  He has been on acalabrutinib now for approximately 3 weeks.  Repeat blood work is demonstrating stable mild anemia and thrombocytopenia is just a bit better White cell count has increased to this is expected at this point in time.  He will see us back in a month.  We will also send off hepatitis panel CMP and LDH. He has mild brusing on extermiteis which have been there before but aware BTK inhibitors can make them a bit worse

## 2022-12-09 ENCOUNTER — APPOINTMENT (OUTPATIENT)
Dept: HEMATOLOGY ONCOLOGY | Facility: CLINIC | Age: 82
End: 2022-12-09

## 2022-12-09 LAB
BASOPHILS # BLD AUTO: 0.03 K/UL
BASOPHILS NFR BLD AUTO: 0.1 %
EOSINOPHIL # BLD AUTO: 0.08 K/UL
EOSINOPHIL NFR BLD AUTO: 0.4 %
HCT VFR BLD CALC: 34.5 %
HGB BLD-MCNC: 11.3 G/DL
IMM GRANULOCYTES NFR BLD AUTO: 0.1 %
LYMPHOCYTES # BLD AUTO: 18.83 K/UL
LYMPHOCYTES NFR BLD AUTO: 87.3 %
MAN DIFF?: NORMAL
MCHC RBC-ENTMCNC: 32.8 G/DL
MCHC RBC-ENTMCNC: 34.3 PG
MCV RBC AUTO: 104.9 FL
MONOCYTES # BLD AUTO: 0.24 K/UL
MONOCYTES NFR BLD AUTO: 1.1 %
NEUTROPHILS # BLD AUTO: 2.37 K/UL
NEUTROPHILS NFR BLD AUTO: 11 %
PLATELET # BLD AUTO: 113 K/UL
RBC # BLD: 3.29 M/UL
RBC # FLD: 13.3 %
WBC # FLD AUTO: 21.57 K/UL

## 2022-12-09 RX ORDER — ACALABRUTINIB 100 MG/1
100 CAPSULE, GELATIN COATED ORAL
Qty: 180 | Refills: 2 | Status: ACTIVE | COMMUNITY
Start: 2022-10-26

## 2022-12-15 LAB
ALBUMIN SERPL ELPH-MCNC: 4.6 G/DL
ALP BLD-CCNC: 55 U/L
ALT SERPL-CCNC: 11 U/L
ANION GAP SERPL CALC-SCNC: 10 MMOL/L
AST SERPL-CCNC: 15 U/L
BILIRUB SERPL-MCNC: 0.4 MG/DL
BUN SERPL-MCNC: 13 MG/DL
CALCIUM SERPL-MCNC: 9 MG/DL
CHLORIDE SERPL-SCNC: 103 MMOL/L
CO2 SERPL-SCNC: 26 MMOL/L
CREAT SERPL-MCNC: 0.8 MG/DL
EGFR: 89 ML/MIN/1.73M2
GLUCOSE SERPL-MCNC: 92 MG/DL
LDH SERPL-CCNC: 151 U/L
POTASSIUM SERPL-SCNC: 4.4 MMOL/L
PROT SERPL-MCNC: 6.1 G/DL
SODIUM SERPL-SCNC: 139 MMOL/L

## 2022-12-30 ENCOUNTER — APPOINTMENT (OUTPATIENT)
Dept: HEMATOLOGY ONCOLOGY | Facility: CLINIC | Age: 82
End: 2022-12-30
Payer: MEDICARE

## 2022-12-30 ENCOUNTER — OUTPATIENT (OUTPATIENT)
Dept: OUTPATIENT SERVICES | Facility: HOSPITAL | Age: 82
LOS: 1 days | End: 2022-12-30

## 2022-12-30 VITALS
TEMPERATURE: 98.4 F | HEART RATE: 74 BPM | BODY MASS INDEX: 26.2 KG/M2 | HEIGHT: 70 IN | RESPIRATION RATE: 16 BRPM | SYSTOLIC BLOOD PRESSURE: 106 MMHG | WEIGHT: 183 LBS | DIASTOLIC BLOOD PRESSURE: 56 MMHG

## 2022-12-30 DIAGNOSIS — C91.10 CHRONIC LYMPHOCYTIC LEUKEMIA OF B-CELL TYPE NOT HAVING ACHIEVED REMISSION: ICD-10-CM

## 2022-12-30 DIAGNOSIS — Z51.11 ENCOUNTER FOR ANTINEOPLASTIC CHEMOTHERAPY: ICD-10-CM

## 2022-12-30 PROCEDURE — 99214 OFFICE O/P EST MOD 30 MIN: CPT

## 2022-12-30 NOTE — HISTORY OF PRESENT ILLNESS
[de-identified] : 01/20/2021 This is a 77 year old male here to establish care. He has a history of asymptomatic CLL. His blood work from 10/27/2015, is showing a white count of 16.8 with lymphocytosis. Hemoglobin was 13.8 and platelets were 226. Folate, T4, TSH, vitamin D were normal. Calcium was 9.1  Normal LFTs. Flow cytometry positive CD5 CD 23 ZAP 70 negative CD38 cw CLL normal FISH.\par \par Blood work from last visit on 02/2017 showed WBC of 19.7, lymphs 82%, HgB 13.5 and plts of 190 with MCV 97.5.\par \par He has no complaints.\par \par Had a colonoscopy 2017 states it was normal and does not want another one.\par \par \par  [de-identified] : 12/21/17\par CC" Her for follow up for my CLL"\par He is doing well. Recovering from a cold. [facility] showed WBc 23.5, HgB 13.7, Pts 160 CMP b12 345 and foalte was >24, LFT were normal. His HgB from today shows a fall to 12.7. MCV is a bit higher. \par \par He has no B symptoms.\par \par 8/28/18\par He is here for follow up. He states he is doing well. He has no complaints. I did not get blood work from [facility].\par \par 2/19/19\par Patient presents for 6 month follow up with [facility].  CBC showed WBC 30.6 (lymphocyte percent not calculated), hemoglobin 14.4, and platelets of 168.  He is concerned because his bloodwork from 8/2018 showed WBC of 20 and wonders if he might need treatment.  Complains of increased fatigue since December but no fevers, night sweats, LAD, anorexia.\par \par 10/29/19\par He is here for follow up. He had blood work in September 25th,  Folate was 8.5,  WBC was 27, Hgb 13.5, Plts 167, ,  PSA  8.4  from 2018. CMP was normal.  CBC from today is stable, slightly worsened anemia at 12.7 WBC is the same. He feels tired but this is chronic, he lost 8 lbs over about one year. \par \par 8/5/20\par He is here for follow up. he had CBC done on 7/31 in [facility] [place]. WBC 25, Plts 129, Hgb 12.6 He has no complaints. He lost about 10 lbs. \par \par 10/7/2020\par Patient is here for a follow-up visit for CLL, accompanied by his daughter.  He is feeling well but states that he has been more fatigued lately and had an episode of night sweats last week which have since resolved.  Most recent CBC is stable with mild leukocytosis.  Patient denies fever, chills, nausea, vomiting, new palpable adenopathy or bleeding.\par \par 1/20/21\par HE is here for follow  up . Since last visit he had CT C/A/P 10/2020 [facility]: Mild adenopathy in chest and axilla, L Splenomegaly with 14.4 cm spleen, he has adenopathy but largest was 3.1 cm  cm in left external iliac node. He had CBC fron on 1/7/21: WBC was 23, Hgb 12.3, Plts 157,   UA is normal. CM is normal. Essentially a stable CBC.   He had a UTI end of December with fevers and night sweats and also his testicle was swollen. He had 10 days of IM and IV antibiotics by Dr. Roche and his sweats were bothersome and he also lost some with  that time. Since than he has gained weight back and only reports 2 mild night sweats in last 2 weeks.  His last UTI was over 20  years back. \par \par 4/28/21\par He feels well. States night sweats are now maybe at most once a month. No other complaints. CBC is stable \par \par 10/27/21\par Patient is here for a follow-up visit for CLL, accompanied by his daughter.  He is feeling well but states that he has been more fatigued lately and had an episode of night sweats last week which are intermittent.  Most recent CBC is stable with mild leukocytosis with WBC 28.  Patient denies fever, chills, nausea, vomiting, new palpable adenopathy or bleeding.  [facility] and WBC was 30.7 and B12 was on the lower end of normal so he started B12 supplement.  \par LAB[facility] ([facility].2021 - [facility]) WBC 30.7, Hgb 12.9, , Cr 0.9, Vit B12 is 254, eGFR 87, PSA 2.83, TSH 3.2, Folate 9.9\par \par 4/20/22\par He is here for follow up. A few months back he had fatigue and sweats that resolved. He feels fine now. No adenopathy or B symptoms CBC is stable form today. with stable WBC \par \par 7/29/22\par HE is here for follow up.  Continues to loose weight. He has night sweats that are a bit worse. [facility] WBC 28, Hgb 12 MCV stable at 107, plts 126 had a CBC today that showed stable counts with white blood cell count of 26, hemoglobin of 11.3 and platelets of 128.  I did not feel any adenopathy on exam.\par \par 09/02/2022 accompanied by his wife and is here to discuss treatment for CLL progression. Now with more dry cough and increasing night sweats and  appetite decrease. 09/01/2022 had tele visit with [place] [address] [phone] who recommended PET prior to treatment initiation and was suggested 3 Rx..\par He also had CT C/A/P that showed progressive adenopathy in the retroperitoneal, mild progressive splenomegaly, hepatomegaly as on prior.  Spleen was approximately\par 15 cm in size the largest lymph node was approximately 1.7 cm in size.  There was also progressive mediastinal and axillary adenopathy.  The largest lymph node was about 14 mm\par \par 10/11/2022\par He is here for follow up. Since last visit he had PET.CT that did not show any areas for concern for Teran transformation. He continued to have night sweats and he saw Dr. De La Rosa last week for follow up and as per patient it was agreed to start Treatment with Acalabrutinib.\par \par [facility] that showed white blood cell count of 34.6 hemoglobin 12.4 platelets of 111 B12 was 1218 CMP was normal.\par \par 11/14/22\par He returns for follow up today. He is on Calquence and so far tolerating. He reports his night sweats have reduced in frequency. He does have some bruising along his arm but we explained that its from the medication. \par \par 12/30/2022\par He is here for follow up; He feels well. has no side effects. No night sweats.  Vitals are  fine. Has a cough.

## 2022-12-30 NOTE — PHYSICAL EXAM
[Restricted in physically strenuous activity but ambulatory and able to carry out work of a light or sedentary nature] : Status 1- Restricted in physically strenuous activity but ambulatory and able to carry out work of a light or sedentary nature, e.g., light house work, office work [Normal] : affect appropriate [de-identified] : some mild bruising noted on right arm

## 2022-12-30 NOTE — CONSULT LETTER
[Dear  ___] : Dear  [unfilled], [Courtesy Letter:] : I had the pleasure of seeing your patient, [unfilled], in my office today. [Please see my note below.] : Please see my note below. [Sincerely,] : Sincerely, [FreeTextEntry3] : Maciel [DrJack  ___] : Dr. VALLES [DrJack ___] : Dr. VALLES

## 2022-12-30 NOTE — ASSESSMENT
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 He had previous CAT scans done in October of 2020.  Night sweats and weight loss has been going on for quite some time just with the symptoms on may be this is a gray area or the worsening of his symptoms may be cannot be considered active disease.\par \par - Review literature and treatment and multiple references were discussed and provided specifically NCCN or leukemia lymphoma Society so he could take read about BTK inhibitors and Bcl-2 inhibitors and anti-CD20 therapy.\par \par - Prior to initiation of treatment we will also repeat FISH, TP53 status (NG), flow cytometry, cytogenetics, and IGHV status for new baseline and this was drawn today on 9/2022 that showed FISH  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 Monica De La Rosa at [address] [id number] [phone] - recommended PET prior to treatment initiation and as per patient also recommended Acalbrutinib \par \par -PET/CT had no concerning signs for Teran transformation \par \par \par # elevated PSA , PSA 2.83 from 09/2021\par - [facility] [place]; now sees Dr. Roche.\par \par # He had skin cancer removed from his nose. \par - follows with DERM.\par \par PLAN:\par \par - He was started on acalabrutinib 100 mg PO Q12H  in 11/2022  he is aware of side effects  and doing wlel has no isseus\par -  Hep panel negative from 11/2022\par - monitor CBC and CMP  and LDH and done today and if stable he will see me back in 2 months \par - we spoke about a possible colonoscopy in the future given the PET/CT findings  and he will reach out to Dr. Kearney in the spring\par \par

## 2022-12-30 NOTE — REVIEW OF SYSTEMS
[Fever] : no fever [Chills] : no chills [Night Sweats] : no night sweats [Fatigue] : no fatigue [Recent Change In Weight] : ~T no recent weight change [Dry Eyes] : no dryness of the eyes [Dysphagia] : no dysphagia [Chest Pain] : no chest pain [Lower Ext Edema] : no lower extremity edema [Shortness Of Breath] : no shortness of breath [SOB on Exertion] : no shortness of breath during exertion [Abdominal Pain] : no abdominal pain [Constipation] : no constipation [Diarrhea] : no diarrhea [Joint Pain] : no joint pain [Skin Rash] : no skin rash [Easy Bleeding] : no tendency for easy bleeding [Negative] : Allergic/Immunologic

## 2023-01-03 LAB
ALBUMIN SERPL ELPH-MCNC: 4.6 G/DL
ALP BLD-CCNC: 51 U/L
ALT SERPL-CCNC: 14 U/L
ANION GAP SERPL CALC-SCNC: 11 MMOL/L
AST SERPL-CCNC: 19 U/L
BASOPHILS # BLD AUTO: 0.02 K/UL
BASOPHILS NFR BLD AUTO: 0.1 %
BILIRUB SERPL-MCNC: 0.4 MG/DL
BUN SERPL-MCNC: 19 MG/DL
CALCIUM SERPL-MCNC: 9.1 MG/DL
CHLORIDE SERPL-SCNC: 103 MMOL/L
CO2 SERPL-SCNC: 24 MMOL/L
CREAT SERPL-MCNC: 0.9 MG/DL
EGFR: 86 ML/MIN/1.73M2
EOSINOPHIL # BLD AUTO: 0.13 K/UL
EOSINOPHIL NFR BLD AUTO: 0.7 %
GLUCOSE SERPL-MCNC: 82 MG/DL
HCT VFR BLD CALC: 37.2 %
HGB BLD-MCNC: 12.5 G/DL
IMM GRANULOCYTES NFR BLD AUTO: 0.2 %
LDH SERPL-CCNC: 188 U/L
LYMPHOCYTES # BLD AUTO: 16.45 K/UL
LYMPHOCYTES NFR BLD AUTO: 83.5 %
MAN DIFF?: NORMAL
MCHC RBC-ENTMCNC: 33.6 G/DL
MCHC RBC-ENTMCNC: 35 PG
MCV RBC AUTO: 104.2 FL
MONOCYTES # BLD AUTO: 0.29 K/UL
MONOCYTES NFR BLD AUTO: 1.5 %
NEUTROPHILS # BLD AUTO: 2.77 K/UL
NEUTROPHILS NFR BLD AUTO: 14 %
PLATELET # BLD AUTO: 101 K/UL
POTASSIUM SERPL-SCNC: 4.9 MMOL/L
PROT SERPL-MCNC: 6.1 G/DL
RBC # BLD: 3.57 M/UL
RBC # FLD: 12.9 %
SODIUM SERPL-SCNC: 138 MMOL/L
WBC # FLD AUTO: 19.69 K/UL

## 2023-02-06 ENCOUNTER — APPOINTMENT (OUTPATIENT)
Dept: HEMATOLOGY ONCOLOGY | Facility: CLINIC | Age: 83
End: 2023-02-06
Payer: MEDICARE

## 2023-02-06 ENCOUNTER — APPOINTMENT (OUTPATIENT)
Dept: HEMATOLOGY ONCOLOGY | Facility: CLINIC | Age: 83
End: 2023-02-06

## 2023-02-06 ENCOUNTER — OUTPATIENT (OUTPATIENT)
Dept: OUTPATIENT SERVICES | Facility: HOSPITAL | Age: 83
LOS: 1 days | End: 2023-02-06
Payer: MEDICARE

## 2023-02-06 ENCOUNTER — LABORATORY RESULT (OUTPATIENT)
Age: 83
End: 2023-02-06

## 2023-02-06 DIAGNOSIS — C91.10 CHRONIC LYMPHOCYTIC LEUKEMIA OF B-CELL TYPE NOT HAVING ACHIEVED REMISSION: ICD-10-CM

## 2023-02-06 LAB
HCT VFR BLD CALC: 37.4 %
HGB BLD-MCNC: 12.2 G/DL
MCHC RBC-ENTMCNC: 32.6 G/DL
MCHC RBC-ENTMCNC: 33.5 PG
MCV RBC AUTO: 102.7 FL
PLATELET # BLD AUTO: 101 K/UL
PMV BLD: 10.7 FL
RBC # BLD: 3.64 M/UL
RBC # FLD: 11.9 %
WBC # FLD AUTO: 14.45 K/UL

## 2023-02-06 PROCEDURE — 80053 COMPREHEN METABOLIC PANEL: CPT

## 2023-02-06 PROCEDURE — 99214 OFFICE O/P EST MOD 30 MIN: CPT

## 2023-02-06 PROCEDURE — 36415 COLL VENOUS BLD VENIPUNCTURE: CPT

## 2023-02-06 PROCEDURE — 85027 COMPLETE CBC AUTOMATED: CPT

## 2023-02-06 PROCEDURE — 83615 LACTATE (LD) (LDH) ENZYME: CPT

## 2023-02-07 DIAGNOSIS — C91.10 CHRONIC LYMPHOCYTIC LEUKEMIA OF B-CELL TYPE NOT HAVING ACHIEVED REMISSION: ICD-10-CM

## 2023-02-07 LAB
ALBUMIN SERPL ELPH-MCNC: 4.2 G/DL
ALP BLD-CCNC: 53 U/L
ALT SERPL-CCNC: 12 U/L
ANION GAP SERPL CALC-SCNC: 10 MMOL/L
AST SERPL-CCNC: 15 U/L
BILIRUB SERPL-MCNC: 0.3 MG/DL
BUN SERPL-MCNC: 17 MG/DL
CALCIUM SERPL-MCNC: 9.1 MG/DL
CHLORIDE SERPL-SCNC: 104 MMOL/L
CO2 SERPL-SCNC: 26 MMOL/L
CREAT SERPL-MCNC: 0.9 MG/DL
EGFR: 86 ML/MIN/1.73M2
GLUCOSE SERPL-MCNC: 96 MG/DL
LDH SERPL-CCNC: 154 U/L
POTASSIUM SERPL-SCNC: 4.9 MMOL/L
PROT SERPL-MCNC: 5.9 G/DL
SODIUM SERPL-SCNC: 140 MMOL/L

## 2023-02-08 NOTE — ASSESSMENT
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 He had previous CAT scans done in October of 2020.  Night sweats and weight loss has been going on for quite some time just with the symptoms on may be this is a gray area or the worsening of his symptoms may be cannot be considered active disease.\par \par - Review literature and treatment and multiple references were discussed and provided specifically NCCN or leukemia lymphoma Society so he could take read about BTK inhibitors and Bcl-2 inhibitors and anti-CD20 therapy.\par \par - Prior to initiation of treatment we will also repeat FISH, TP53 status (NG), flow cytometry, cytogenetics, and IGHV status for new baseline and this was drawn today on 9/2022 that showed FISH  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 Monica De La Rosa at [address] [id number] [phone] - recommended PET prior to treatment initiation and as per patient also recommended Acalbrutinib \par \par -PET/CT had no concerning signs for Teran transformation \par \par # elevated PSA , PSA 2.83 from 09/2021\par - [facility] [place]; now sees Dr. Roche.\par \par # He had skin cancer removed from his nose. \par - follows with DERM.\par \par # Tongue lesion: Per patient, present for about 5 years, recently minimally enlarged\par - If growing will refer to ENT\par \par PLAN:\par - He was started on acalabrutinib 100 mg PO Q12H  in 11/2022  he is aware of side effects  and doing well without any issues except mild fatigue. CBC shows continued improvement in ALC and stable hemoglobin and platelets\par -  Hep panel negative from 11/2022\par - monitor CBC, CMP and LDH  \par - [facility] ; advised to hold acalabrutinib 2 days prior and day off procedure\par \par RTC in 3 months\par

## 2023-02-08 NOTE — PHYSICAL EXAM
[Restricted in physically strenuous activity but ambulatory and able to carry out work of a light or sedentary nature] : Status 1- Restricted in physically strenuous activity but ambulatory and able to carry out work of a light or sedentary nature, e.g., light house work, office work [Normal] : affect appropriate [de-identified] : Raised erythematous lesion on left aspect of tongue [de-identified] : some mild bruising noted on right arm

## 2023-02-08 NOTE — REVIEW OF SYSTEMS
[Negative] : Allergic/Immunologic [Fatigue] : fatigue [Fever] : no fever [Chills] : no chills [Night Sweats] : no night sweats [Recent Change In Weight] : ~T no recent weight change [Dry Eyes] : no dryness of the eyes [Dysphagia] : no dysphagia [Chest Pain] : no chest pain [Lower Ext Edema] : no lower extremity edema [Shortness Of Breath] : no shortness of breath [SOB on Exertion] : no shortness of breath during exertion [Abdominal Pain] : no abdominal pain [Constipation] : no constipation [Diarrhea] : no diarrhea [Joint Pain] : no joint pain [Skin Rash] : no skin rash [Easy Bleeding] : no tendency for easy bleeding

## 2023-02-08 NOTE — END OF VISIT
[] : Fellow [FreeTextEntry3] : He is here for follow up WBC are coming down rest of CBC is stable. He feels well. On Acalabrutinib for CLL. He can RTC in 3 months

## 2023-02-08 NOTE — HISTORY OF PRESENT ILLNESS
[de-identified] : 01/20/2021 This is a 77 year old male here to establish care. He has a history of asymptomatic CLL. His blood work from 10/27/2015, is showing a white count of 16.8 with lymphocytosis. Hemoglobin was 13.8 and platelets were 226. Folate, T4, TSH, vitamin D were normal. Calcium was 9.1  Normal LFTs. Flow cytometry positive CD5 CD 23 ZAP 70 negative CD38 cw CLL normal FISH.\par \par Blood work from last visit on 02/2017 showed WBC of 19.7, lymphs 82%, HgB 13.5 and plts of 190 with MCV 97.5.\par \par He has no complaints.\par \par Had a colonoscopy 2017 states it was normal and does not want another one.\par \par \par  [de-identified] : 12/21/17\par CC" Her for follow up for my CLL"\par He is doing well. Recovering from a cold. [facility] WBc 23.5, HgB 13.7, Pts 160 CMP b12 345 and foalte was >24, LFT were normal. His HgB from today shows a fall to 12.7. MCV is a bit higher. \par \par He has no B symptoms.\par \par 8/28/18\par He is here for follow up. He states he is doing well. He has no complaints. [facility] not get [facility] work [facility].\par \par 2/19/19\par Patient presents for 6 month follow up [facility].  CBC showed WBC 30.6 (lymphocyte percent not calculated), hemoglobin 14.4, and platelets of 168.  He is concerned because his bloodwork from 8/2018 showed WBC of 20 and wonders if he might need treatment.  Complains of increased fatigue since December but no fevers, night sweats, LAD, anorexia.\par \par 10/29/19\par He is here for follow up. He had blood work in September 25th,  Folate was 8.5,  WBC was 27, Hgb 13.5, Plts 167, ,  PSA  8.4  from 2018. CMP was normal.  CBC from today is stable, slightly worsened anemia at 12.7 WBC is the same. He feels tired but this is chronic, he lost 8 lbs over about one year. \par \par 8/5/20\par He is here for follow up. he had CBC done on 7/31 in [facility]. WBC 25, Plts 129, Hgb 12.6 He has no complaints. He lost about 10 lbs. \par \par 10/7/2020\par Patient is here for a follow-up visit for CLL, accompanied by his daughter.  He is feeling well but states that he has been more fatigued lately and had an episode of night sweats last week which have since resolved.  Most recent CBC is stable with mild leukocytosis.  Patient denies fever, chills, nausea, vomiting, new palpable adenopathy or bleeding.\par \par 1/20/21\par HE is here for follow  up . Since last visit he had CT C/A/P 10/2020 [facility]: Mild adenopathy in chest and axilla, L Splenomegaly with 14.4 cm spleen, he has adenopathy but largest was 3.1 cm  cm in left external iliac node. He had CBC fron on 1/7/21: WBC was 23, Hgb 12.3, Plts 157,   UA is normal. CM is normal. Essentially a stable CBC.   He had a UTI end of December with fevers and night sweats and also his testicle was swollen. He had 10 days of IM and IV antibiotics by Dr. Roche and his sweats were bothersome and he also lost some with  that time. Since than he has gained weight back and only reports 2 mild night sweats in last 2 weeks.  His last UTI was over 20  years back. \par \par 4/28/21\par He feels well. States night sweats are now maybe at most once a month. No other complaints. CBC is stable \par \par 10/27/21\par Patient is here for a follow-up visit for CLL, accompanied by his daughter.  He is feeling well but states that he has been more fatigued lately and had an episode of night sweats last week which are intermittent.  Most recent CBC is stable with mild leukocytosis with WBC 28.  Patient denies fever, chills, nausea, vomiting, new palpable adenopathy or bleeding.  [facility] and WBC was 30.7 and B12 was on the lower end of normal so he started B12 supplement.  \par [facility] ([facility]21 - [facility]) WBC 30.7, Hgb 12.9, , Cr 0.9, Vit B12 is 254, eGFR 87, PSA 2.83, TSH 3.2, Folate 9.9\par \par 4/20/22\par He is here for follow up. A few months back he had fatigue and sweats that resolved. He feels fine now. No adenopathy or B symptoms CBC is stable form today. with stable WBC \par \par 7/29/22\par HE is here for follow up.  Continues to loose weight. He has night sweats that are a bit worse. [facility] WBC 28, Hgb 12 MCV stable at 107, plts 126 had a CBC today that showed stable counts with white blood cell count of 26, hemoglobin of 11.3 and platelets of 128.  I did not feel any adenopathy on exam.\par \par 09/02/2022 accompanied by his wife and is here to discuss treatment for CLL progression. Now with more dry cough and increasing night sweats and  appetite decrease. 09/01/2022 had tele visit with [place] [address] [phone] who recommended PET prior to treatment initiation and was suggested 3 Rx..\par He also had CT C/A/P that showed progressive adenopathy in the retroperitoneal, mild progressive splenomegaly, hepatomegaly as on prior.  Spleen was approximately\par 15 cm in size the largest lymph node was approximately 1.7 cm in size.  There was also progressive mediastinal and axillary adenopathy.  The largest lymph node was about 14 mm\par \par 10/11/2022\par He is here for follow up. Since last visit he had PET.CT that did not show any areas for concern for Teran transformation. He continued to have night sweats and he saw Dr. De La Rosa last week for follow up and as per patient it was agreed to start Treatment with Acalabrutinib.\par \par [facility] that showed white blood cell count of 34.6 hemoglobin 12.4 platelets of 111 B12 was 1218 CMP was normal.\par \par 11/14/22\par He returns for follow up today. He is on Calquence and so far tolerating. He reports his night sweats have reduced in frequency. He does have some bruising along his arm but we explained that its from the medication. \par \par 12/30/2022\par He is here for follow up; He feels well. has no side effects. No night sweats.  Vitals are  fine. Has a cough. \par \par 2/6/23: Patient returns for followup. His CBC showed improvement in ALC with stable hemoglobin and platelet count. He is doing well. His only complaints are fatigue and easy bruising with some ecchymoses on his hands and arms. His night sweats have mostly resolved. HIs weight is stable. [facility] \par He went to the dentist recently who noted that a lesion he has on his tongue for 5 years appears minimally enlarged.

## 2023-02-15 DIAGNOSIS — Z51.11 ENCOUNTER FOR ANTINEOPLASTIC CHEMOTHERAPY: ICD-10-CM

## 2023-04-03 ENCOUNTER — LABORATORY RESULT (OUTPATIENT)
Age: 83
End: 2023-04-03

## 2023-04-03 ENCOUNTER — APPOINTMENT (OUTPATIENT)
Dept: HEMATOLOGY ONCOLOGY | Facility: CLINIC | Age: 83
End: 2023-04-03

## 2023-04-03 ENCOUNTER — OUTPATIENT (OUTPATIENT)
Dept: OUTPATIENT SERVICES | Facility: HOSPITAL | Age: 83
LOS: 1 days | End: 2023-04-03
Payer: MEDICARE

## 2023-04-03 DIAGNOSIS — C91.10 CHRONIC LYMPHOCYTIC LEUKEMIA OF B-CELL TYPE NOT HAVING ACHIEVED REMISSION: ICD-10-CM

## 2023-04-03 DIAGNOSIS — Z51.11 ENCOUNTER FOR ANTINEOPLASTIC CHEMOTHERAPY: ICD-10-CM

## 2023-04-03 LAB
HCT VFR BLD CALC: 39.6 %
HGB BLD-MCNC: 12.8 G/DL
MCHC RBC-ENTMCNC: 32.3 G/DL
MCHC RBC-ENTMCNC: 32.9 PG
MCV RBC AUTO: 101.8 FL
PLATELET # BLD AUTO: 106 K/UL
PMV BLD: 10.4 FL
RBC # BLD: 3.89 M/UL
RBC # FLD: 12.6 %
WBC # FLD AUTO: 11.79 K/UL

## 2023-04-03 PROCEDURE — 85027 COMPLETE CBC AUTOMATED: CPT

## 2023-04-03 PROCEDURE — 83615 LACTATE (LD) (LDH) ENZYME: CPT

## 2023-04-03 PROCEDURE — 80053 COMPREHEN METABOLIC PANEL: CPT

## 2023-04-04 LAB
ALBUMIN SERPL ELPH-MCNC: 4.2 G/DL
ALP BLD-CCNC: 55 U/L
ALT SERPL-CCNC: 15 U/L
ANION GAP SERPL CALC-SCNC: 9 MMOL/L
AST SERPL-CCNC: 15 U/L
BILIRUB SERPL-MCNC: 0.3 MG/DL
BUN SERPL-MCNC: 17 MG/DL
CALCIUM SERPL-MCNC: 9.2 MG/DL
CHLORIDE SERPL-SCNC: 104 MMOL/L
CO2 SERPL-SCNC: 26 MMOL/L
CREAT SERPL-MCNC: 0.9 MG/DL
EGFR: 86 ML/MIN/1.73M2
GLUCOSE SERPL-MCNC: 102 MG/DL
LDH SERPL-CCNC: 126 U/L
POTASSIUM SERPL-SCNC: 4.5 MMOL/L
PROT SERPL-MCNC: 6.1 G/DL
SODIUM SERPL-SCNC: 139 MMOL/L

## 2023-04-11 ENCOUNTER — NON-APPOINTMENT (OUTPATIENT)
Age: 83
End: 2023-04-11

## 2023-05-18 DIAGNOSIS — R10.9 UNSPECIFIED ABDOMINAL PAIN: ICD-10-CM

## 2023-05-31 ENCOUNTER — OUTPATIENT (OUTPATIENT)
Dept: OUTPATIENT SERVICES | Facility: HOSPITAL | Age: 83
LOS: 1 days | End: 2023-05-31
Payer: MEDICARE

## 2023-05-31 ENCOUNTER — LABORATORY RESULT (OUTPATIENT)
Age: 83
End: 2023-05-31

## 2023-05-31 ENCOUNTER — APPOINTMENT (OUTPATIENT)
Dept: HEMATOLOGY ONCOLOGY | Facility: CLINIC | Age: 83
End: 2023-05-31
Payer: MEDICARE

## 2023-05-31 VITALS
RESPIRATION RATE: 16 BRPM | DIASTOLIC BLOOD PRESSURE: 60 MMHG | SYSTOLIC BLOOD PRESSURE: 109 MMHG | WEIGHT: 198 LBS | TEMPERATURE: 98.2 F | BODY MASS INDEX: 28.41 KG/M2 | HEART RATE: 73 BPM

## 2023-05-31 DIAGNOSIS — C91.10 CHRONIC LYMPHOCYTIC LEUKEMIA OF B-CELL TYPE NOT HAVING ACHIEVED REMISSION: ICD-10-CM

## 2023-05-31 LAB
ALBUMIN SERPL ELPH-MCNC: 4.3 G/DL
ALP BLD-CCNC: 56 U/L
ALT SERPL-CCNC: 18 U/L
ANION GAP SERPL CALC-SCNC: 8 MMOL/L
AST SERPL-CCNC: 18 U/L
BILIRUB SERPL-MCNC: 0.3 MG/DL
BUN SERPL-MCNC: 19 MG/DL
CALCIUM SERPL-MCNC: 8.9 MG/DL
CHLORIDE SERPL-SCNC: 102 MMOL/L
CO2 SERPL-SCNC: 26 MMOL/L
CREAT SERPL-MCNC: 1 MG/DL
EGFR: 76 ML/MIN/1.73M2
GLUCOSE SERPL-MCNC: 104 MG/DL
HCT VFR BLD CALC: 36.6 %
HGB BLD-MCNC: 12.1 G/DL
LDH SERPL-CCNC: 129 U/L
MCHC RBC-ENTMCNC: 33.1 G/DL
MCHC RBC-ENTMCNC: 33.6 PG
MCV RBC AUTO: 101.7 FL
PLATELET # BLD AUTO: 117 K/UL
PMV BLD: 10.3 FL
POTASSIUM SERPL-SCNC: 4.7 MMOL/L
PROT SERPL-MCNC: 5.9 G/DL
RBC # BLD: 3.6 M/UL
RBC # FLD: 12.3 %
SODIUM SERPL-SCNC: 136 MMOL/L
WBC # FLD AUTO: 9.89 K/UL

## 2023-05-31 PROCEDURE — 85027 COMPLETE CBC AUTOMATED: CPT

## 2023-05-31 PROCEDURE — 99214 OFFICE O/P EST MOD 30 MIN: CPT

## 2023-05-31 PROCEDURE — 80053 COMPREHEN METABOLIC PANEL: CPT

## 2023-05-31 PROCEDURE — 83615 LACTATE (LD) (LDH) ENZYME: CPT

## 2023-05-31 PROCEDURE — 36415 COLL VENOUS BLD VENIPUNCTURE: CPT

## 2023-05-31 NOTE — REVIEW OF SYSTEMS
[Fatigue] : fatigue [Negative] : Allergic/Immunologic [Fever] : no fever [Chills] : no chills [Night Sweats] : no night sweats [Recent Change In Weight] : ~T no recent weight change [Dry Eyes] : no dryness of the eyes [Dysphagia] : no dysphagia [Chest Pain] : no chest pain [Lower Ext Edema] : no lower extremity edema [Shortness Of Breath] : no shortness of breath [SOB on Exertion] : no shortness of breath during exertion [Abdominal Pain] : no abdominal pain [Constipation] : no constipation [Joint Pain] : no joint pain [Skin Rash] : no skin rash [Easy Bleeding] : no tendency for easy bleeding

## 2023-05-31 NOTE — PHYSICAL EXAM
[Restricted in physically strenuous activity but ambulatory and able to carry out work of a light or sedentary nature] : Status 1- Restricted in physically strenuous activity but ambulatory and able to carry out work of a light or sedentary nature, e.g., light house work, office work [Normal] : grossly intact [de-identified] : Raised erythematous lesion on left aspect of tongue [de-identified] : some mild bruising noted on right arm

## 2023-05-31 NOTE — HISTORY OF PRESENT ILLNESS
[de-identified] : 01/20/2021 This is a 77 year old male here to establish care. He has a history of asymptomatic CLL. His blood work from 10/27/2015, is showing a white count of 16.8 with lymphocytosis. Hemoglobin was 13.8 and platelets were 226. Folate, T4, TSH, vitamin D were normal. Calcium was 9.1  Normal LFTs. Flow cytometry positive CD5 CD 23 ZAP 70 negative CD38 cw CLL normal FISH.\par \par Blood work from last visit on 02/2017 showed WBC of 19.7, lymphs 82%, HgB 13.5 and plts of 190 with MCV 97.5.\par \par He has no complaints.\par \par Had a colonoscopy 2017 states it was normal and does not want another one.\par \par \par  [de-identified] : 12/21/17\par CC" Her for follow up for my CLL"\par He is doing well. Recovering from a cold. [facility] showed WBc 23.5, HgB 13.7, Pts 160 CMP b12 345 and foalte was >24, LFT were normal. His HgB from today shows a fall to 12.7. MCV is a bit higher. \par \par He has no B symptoms.\par \par 8/28/18\par He is here for follow up. He states he is doing well. He has no complaints. I [facility] not get [facility] work from [facility].\par \par 2/19/19\par Patient presents for 6 month follow up [facility].  CBC showed WBC 30.6 (lymphocyte percent not calculated), hemoglobin 14.4, and platelets of 168.  He is concerned because his bloodwork from 8/2018 showed WBC of 20 and wonders if he might need treatment.  Complains of increased fatigue since December but no fevers, night sweats, LAD, anorexia.\par \par 10/29/19\par He is here for follow up. He had blood work in September 25th,  Folate was 8.5,  WBC was 27, Hgb 13.5, Plts 167, ,  PSA  8.4  from 2018. CMP was normal.  CBC from today is stable, slightly worsened anemia at 12.7 WBC is the same. He feels tired but this is chronic, he lost 8 lbs over about one year. \par \par 8/5/20\par He is here for follow up. he had CBC done on 7/31 in [facility]. WBC 25, Plts 129, Hgb 12.6 He has no complaints. He lost about 10 lbs. \par \par 10/7/2020\par Patient is here for a follow-up visit for CLL, accompanied by his daughter.  He is feeling well but states that he has been more fatigued lately and had an episode of night sweats last week which have since resolved.  Most recent CBC is stable with mild leukocytosis.  Patient denies fever, chills, nausea, vomiting, new palpable adenopathy or bleeding.\par \par 1/20/21\par HE is here for follow  up . Since last visit he had CT C/A/P 10/2020 [facility]: Mild adenopathy in chest and axilla, L Splenomegaly with 14.4 cm spleen, he has adenopathy but largest was 3.1 cm  cm in left external iliac node. He had CBC fron on 1/7/21: WBC was 23, Hgb 12.3, Plts 157,   UA is normal. CM is normal. Essentially a stable CBC.   He had a UTI end of December with fevers and night sweats and also his testicle was swollen. He had 10 days of IM and IV antibiotics by Dr. Roche and his sweats were bothersome and he also lost some with  that time. Since than he has gained weight back and only reports 2 mild night sweats in last 2 weeks.  His last UTI was over 20  years back. \par \par 4/28/21\par He feels well. States night sweats are now maybe at most once a month. No other complaints. CBC is stable \par \par 10/27/21\par Patient is here for a follow-up visit for CLL, accompanied by his daughter.  He is feeling well but states that he has been more fatigued lately and had an episode of night sweats last week which are intermittent.  Most recent CBC is stable with mild leukocytosis with WBC 28.  Patient denies fever, chills, nausea, vomiting, new palpable adenopathy or bleeding.  [facility] and WBC was 30.7 and B12 was on the lower end of normal so he started B12 supplement.  \par LAB[facility] ([facility].2021 - [facility]) WBC 30.7, Hgb 12.9, , Cr 0.9, Vit B12 is 254, eGFR 87, PSA 2.83, TSH 3.2, Folate 9.9\par \par 4/20/22\par He is here for follow up. A few months back he had fatigue and sweats that resolved. He feels fine now. No adenopathy or B symptoms CBC is stable form today. with stable WBC \par \par 7/29/22\par HE is here for follow up.  Continues to loose weight. He has night sweats that are a bit worse. [facility] WBC 28, Hgb 12 MCV stable at 107, plts 126 had a CBC today that showed stable counts with white blood cell count of 26, hemoglobin of 11.3 and platelets of 128.  I did not feel any adenopathy on exam.\par \par 09/02/2022 accompanied by his wife and is here to discuss treatment for CLL progression. Now with more dry cough and increasing night sweats and  appetite decrease. 09/01/2022 had tele visit with [place] [address] [phone] who recommended PET prior to treatment initiation and was suggested 3 Rx..\par He also had CT C/A/P that showed progressive adenopathy in the retroperitoneal, mild progressive splenomegaly, hepatomegaly as on prior.  Spleen was approximately\par 15 cm in size the largest lymph node was approximately 1.7 cm in size.  There was also progressive mediastinal and axillary adenopathy.  The largest lymph node was about 14 mm\par \par 10/11/2022\par He is here for follow up. Since last visit he had PET.CT that did not show any areas for concern for Teran transformation. He continued to have night sweats and he saw Dr. De La Rosa last week for follow up and as per patient it was agreed to start Treatment with Acalabrutinib.\par \par [facility] that showed white blood cell count of 34.6 hemoglobin 12.4 platelets of 111 B12 was 1218 CMP was normal.\par \par 11/14/22\par He returns for follow up today. He is on Calquence and so far tolerating. He reports his night sweats have reduced in frequency. He does have some bruising along his arm but we explained that its from the medication. \par \par 12/30/2022\par He is here for follow up; He feels well. has no side effects. No night sweats.  Vitals are  fine. Has a cough. \par \par 2/6/23: Patient returns for followup. His CBC showed improvement in ALC with stable hemoglobin and platelet count. He is doing well. His only complaints are fatigue and easy bruising with some ecchymoses on his hands and arms. His night sweats have mostly resolved. HIs weight is stable. [facility] \par He went to the dentist recently who noted that a lesion he has on his tongue for 5 years appears minimally enlarged. \par \par 5/31/23\par He is here for follow up. He is doing well. Gained some weight.  CBC today showed normal WBC counts total, Hgb and plts stable.  Has rare sweats at night,

## 2023-05-31 NOTE — ASSESSMENT
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 He had previous CAT scans done in October of 2020.  Night sweats and weight loss has been going on for quite some time just with the symptoms on may be this is a gray area or the worsening of his symptoms may be cannot be considered active disease.\par \par - Review literature and treatment and multiple references were discussed and provided specifically NCCN or leukemia lymphoma Society so he could take read about BTK inhibitors and Bcl-2 inhibitors and anti-CD20 therapy.\par \par - Prior to initiation of treatment we will also repeat FISH, TP53 status (NG), flow cytometry, cytogenetics, and IGHV status for new baseline and this was drawn today on 9/2022 that showed FISH  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 Monica De La Rosa at [address] [id number] [phone] - recommended PET prior to treatment initiation and as per patient also recommended Acalbrutinib \par \par -PET/CT had no concerning signs for Teran transformation \par \par # elevated PSA , PSA 2.83 from 09/2021\par - [facility] [place]; now sees Dr. Roche.\par \par # He had skin cancer removed from his nose. \par - follows with DERM.\par \par # Tongue lesion: Per patient, present for about 5 years, recently minimally enlarged\par - If growing will refer to ENT\par \par PLAN:\par - He was started on acalabrutinib 100 mg PO Q12H  in 11/2022  he is aware of side effects  and doing well without any issues except mild fatigue. CBC shows continued improvement in ALC, 6.28 today  and stable hemoglobin and platelets\par -  Hep panel negative from 11/2022\par - monitor CBC, CMP and LDH  \par -[facility] and [facility]oscopy \par \par RTC in 3 months\par

## 2023-06-01 DIAGNOSIS — C91.10 CHRONIC LYMPHOCYTIC LEUKEMIA OF B-CELL TYPE NOT HAVING ACHIEVED REMISSION: ICD-10-CM

## 2023-06-16 ENCOUNTER — NON-APPOINTMENT (OUTPATIENT)
Age: 83
End: 2023-06-16

## 2023-08-30 ENCOUNTER — LABORATORY RESULT (OUTPATIENT)
Age: 83
End: 2023-08-30

## 2023-08-30 ENCOUNTER — OUTPATIENT (OUTPATIENT)
Dept: OUTPATIENT SERVICES | Facility: HOSPITAL | Age: 83
LOS: 1 days | End: 2023-08-30
Payer: MEDICARE

## 2023-08-30 ENCOUNTER — APPOINTMENT (OUTPATIENT)
Dept: HEMATOLOGY ONCOLOGY | Facility: CLINIC | Age: 83
End: 2023-08-30
Payer: MEDICARE

## 2023-08-30 VITALS
DIASTOLIC BLOOD PRESSURE: 66 MMHG | SYSTOLIC BLOOD PRESSURE: 111 MMHG | HEART RATE: 71 BPM | TEMPERATURE: 98.3 F | HEIGHT: 70 IN | WEIGHT: 203 LBS | RESPIRATION RATE: 16 BRPM | BODY MASS INDEX: 29.06 KG/M2

## 2023-08-30 DIAGNOSIS — C91.10 CHRONIC LYMPHOCYTIC LEUKEMIA OF B-CELL TYPE NOT HAVING ACHIEVED REMISSION: ICD-10-CM

## 2023-08-30 LAB
HCT VFR BLD CALC: 36.4 %
HGB BLD-MCNC: 12.4 G/DL
MCHC RBC-ENTMCNC: 34.1 G/DL
MCHC RBC-ENTMCNC: 34.3 PG
MCV RBC AUTO: 100.6 FL
PLATELET # BLD AUTO: 131 K/UL
PMV BLD: 10.5 FL
RBC # BLD: 3.62 M/UL
RBC # FLD: 12.3 %
WBC # FLD AUTO: 8.92 K/UL

## 2023-08-30 PROCEDURE — 99214 OFFICE O/P EST MOD 30 MIN: CPT

## 2023-08-30 PROCEDURE — 83615 LACTATE (LD) (LDH) ENZYME: CPT

## 2023-08-30 PROCEDURE — 80053 COMPREHEN METABOLIC PANEL: CPT

## 2023-08-30 PROCEDURE — 85027 COMPLETE CBC AUTOMATED: CPT

## 2023-08-30 NOTE — PHYSICAL EXAM
[Restricted in physically strenuous activity but ambulatory and able to carry out work of a light or sedentary nature] : Status 1- Restricted in physically strenuous activity but ambulatory and able to carry out work of a light or sedentary nature, e.g., light house work, office work [Normal] : affect appropriate [de-identified] : Raised erythematous lesion on left aspect of tongue [de-identified] : some mild bruising noted on right arm

## 2023-08-30 NOTE — HISTORY OF PRESENT ILLNESS
[de-identified] : 01/20/2021 This is a 77 year old male here to establish care. He has a history of asymptomatic CLL. His blood work from 10/27/2015, is showing a white count of 16.8 with lymphocytosis. Hemoglobin was 13.8 and platelets were 226. Folate, T4, TSH, vitamin D were normal. Calcium was 9.1  Normal LFTs. Flow cytometry positive CD5 CD 23 ZAP 70 negative CD38 cw CLL normal FISH.\par  \par  Blood work from last visit on 02/2017 showed WBC of 19.7, lymphs 82%, HgB 13.5 and plts of 190 with MCV 97.5.\par  \par  He has no complaints.\par  \par  Had a colonoscopy 2017 states it was normal and does not want another one.\par  \par  \par   [de-identified] : 12/21/17 CC" Her for follow up for my CLL" He is doing well. Recovering from a cold. [facility] showed WBc 23.5, HgB 13.7, Pts 160 CMP b12 345 and foalte was >24, LFT were normal. His HgB from today shows a fall to 12.7. MCV is a bit higher.   He has no B symptoms.  8/28/18 He is here for follow up. He states he is doing well. He has no complaints. [facility] not get [facility] work [facility].  2/19/19 Patient presents for 6 month follow up [facility].  CBC showed WBC 30.6 (lymphocyte percent not calculated), hemoglobin 14.4, and platelets of 168.  He is concerned because his bloodwork from 8/2018 showed WBC of 20 and wonders if he might need treatment.  Complains of increased fatigue since December but no fevers, night sweats, LAD, anorexia.  10/29/19 He is here for follow up. He had blood work in September 25th,  Folate was 8.5,  WBC was 27, Hgb 13.5, Plts 167, ,  PSA  8.4  from 2018. CMP was normal.  CBC from today is stable, slightly worsened anemia at 12.7 WBC is the same. He feels tired but this is chronic, he lost 8 lbs over about one year.   8/5/20 He is here for follow up. he had CBC done on 7/31 in [facility]. WBC 25, Plts 129, Hgb 12.6 He has no complaints. He lost about 10 lbs.   10/7/2020 Patient is here for a follow-up visit for CLL, accompanied by his daughter.  He is feeling well but states that he has been more fatigued lately and had an episode of night sweats last week which have since resolved.  Most recent CBC is stable with mild leukocytosis.  Patient denies fever, chills, nausea, vomiting, new palpable adenopathy or bleeding.  1/20/21 HE is here for follow  up . Since last visit he had CT C/A/P 10/2020 [facility]: Mild adenopathy in chest and axilla, L Splenomegaly with 14.4 cm spleen, he has adenopathy but largest was 3.1 cm  cm in left external iliac node. He had CBC fron on 1/7/21: WBC was 23, Hgb 12.3, Plts 157,   UA is normal. CM is normal. Essentially a stable CBC.   He had a UTI end of December with fevers and night sweats and also his testicle was swollen. He had 10 days of IM and IV antibiotics by Dr. Roche and his sweats were bothersome and he also lost some with  that time. Since than he has gained weight back and only reports 2 mild night sweats in last 2 weeks.  His last UTI was over 20  years back.   4/28/21 He feels well. States night sweats are now maybe at most once a month. No other complaints. CBC is stable   10/27/21 Patient is here for a follow-up visit for CLL, accompanied by his daughter.  He is feeling well but states that he has been more fatigued lately and had an episode of night sweats last week which are intermittent.  Most recent CBC is stable with mild leukocytosis with WBC 28.  Patient denies fever, chills, nausea, vomiting, new palpable adenopathy or bleeding.  [facility] and WBC was 30.7 and B12 was on the lower end of normal so he started B12 supplement.   [facility] ([facility].2021 - [facility]) WBC 30.7, Hgb 12.9, , Cr 0.9, Vit B12 is 254, eGFR 87, PSA 2.83, TSH 3.2, Folate 9.9  4/20/22 He is here for follow up. A few months back he had fatigue and sweats that resolved. He feels fine now. No adenopathy or B symptoms CBC is stable form today. with stable WBC   7/29/22 HE is here for follow up.  Continues to loose weight. He has night sweats that are a bit worse. [facility] WBC 28, Hgb 12 MCV stable at 107, plts 126 had a CBC today that showed stable counts with white blood cell count of 26, hemoglobin of 11.3 and platelets of 128.  I did not feel any adenopathy on exam.  09/02/2022 accompanied by his wife and is here to discuss treatment for CLL progression. Now with more dry cough and increasing night sweats and  appetite decrease. 09/01/2022 had tele visit with Dr. Monica De La Rosa at [address] [place] [address] [phone] who recommended PET prior to treatment initiation and was suggested 3 Rx.. He also had CT C/A/P that showed progressive adenopathy in the retroperitoneal, mild progressive splenomegaly, hepatomegaly as on prior.  Spleen was approximately 15 cm in size the largest lymph node was approximately 1.7 cm in size.  There was also progressive mediastinal and axillary adenopathy.  The largest lymph node was about 14 mm  10/11/2022 He is here for follow up. Since last visit he had PET.CT that did not show any areas for concern for Teran transformation. He continued to have night sweats and he saw Dr. De La Rosa last week for follow up and as per patient it was agreed to start Treatment with Acalabrutinib.  [facility]22 that showed white blood cell count of 34.6 hemoglobin 12.4 platelets of 111 B12 was 1218 CMP was normal.  11/14/22 He returns for follow up today. He is on Calquence and so far tolerating. He reports his night sweats have reduced in frequency. He does have some bruising along his arm but we explained that its from the medication.   12/30/2022 He is here for follow up; He feels well. has no side effects. No night sweats.  Vitals are  fine. Has a cough.   2/6/23: Patient returns for followup. His CBC showed improvement in ALC with stable hemoglobin and platelet count. He is doing well. His only complaints are fatigue and easy bruising with some ecchymoses on his hands and arms. His night sweats have mostly resolved. HIs weight is stable. [facility]  He went to the dentist recently who noted that a lesion he has on his tongue for 5 years appears minimally enlarged.   5/31/23 He is here for follow up. He is doing well. Gained some weight.  CBC today showed normal WBC counts total, Hgb and plts stable.  Has rare sweats at night,   8/30/23 He returns for follow-up he feels well no longer has night sweats for about a week CBC today shows normal platelets mild anemia normal total white blood cell count and lymphocytosis is also improving hopefully will achieve a CR.  Blood pressure is good and pulse was regular.

## 2023-08-30 NOTE — ASSESSMENT
[FreeTextEntry1] : # CLL, Gracia stage II, mild splenomegaly, with adenopathy. Mild anemia and thrombocytopenia on observation for many years. -  07/02/2022 Although his CBC i is stable and no obvious adenopathy on exam he continues to have weight loss and night sweats and I explained to the patient this may be a reason to start treatment but at this time he wishes  to continue with observation so we ordered CT chest abdomen and pelvis to evaluate for worsening of adenopathy. He had previous CAT scans done in October of 2020.  Night sweats and weight loss has been going on for quite some time just with the symptoms on may be this is a gray area or the worsening of his symptoms may be cannot be considered active disease.  - Review literature and treatment and multiple references were discussed and provided specifically NCCN or leukemia lymphoma Society so he could take read about BTK inhibitors and Bcl-2 inhibitors and anti-CD20 therapy.  - Prior to initiation of treatment we will also repeat FISH, TP53 status (NG), flow cytometry, cytogenetics, and IGHV status for new baseline and this was drawn today on 9/2022 that showed FISH  Loss of  3IGH with partial loss of  5IGH detected (89.5%) - see below IGVH mutation negative,  LDH normal  09/01/2022 had tele visit with Dr. Monica De La Rosa at [address] [address] [phone] - recommended PET prior to treatment initiation and as per patient also recommended Acalbrutinib   -PET/CT had no concerning signs for Teran transformation   # elevated PSA , PSA 2.83 from 09/2021 - [facility] [place]; now sees Dr. Roche.  # He had skin cancer removed from his nose.  - follows with [facility].  # Tongue lesion: Per patient, present for about 5 years, recently minimally enlarged - If growing will refer to ENT  PLAN: - He was started on acalabrutinib 100 mg PO Q12H  in 11/2022  he is aware of side effects  and doing well without any issues except mild fatigue. CBC shows continued improvement in ALC,4.8 today  and stable hemoglobin and platelets -  Hep panel negative from 11/2022 - monitor CBC, CMP and LDH   -[facility] and [facility]   RTC in 3 months

## 2023-08-31 LAB
ALBUMIN SERPL ELPH-MCNC: 4 G/DL
ALP BLD-CCNC: 62 U/L
ALT SERPL-CCNC: 15 U/L
ANION GAP SERPL CALC-SCNC: 10 MMOL/L
AST SERPL-CCNC: 16 U/L
BILIRUB SERPL-MCNC: 0.3 MG/DL
BUN SERPL-MCNC: 16 MG/DL
CALCIUM SERPL-MCNC: 9 MG/DL
CHLORIDE SERPL-SCNC: 103 MMOL/L
CO2 SERPL-SCNC: 24 MMOL/L
CREAT SERPL-MCNC: 0.9 MG/DL
EGFR: 86 ML/MIN/1.73M2
GLUCOSE SERPL-MCNC: 97 MG/DL
LDH SERPL-CCNC: 155 U/L
POTASSIUM SERPL-SCNC: 4.6 MMOL/L
PROT SERPL-MCNC: 5.8 G/DL
SODIUM SERPL-SCNC: 137 MMOL/L

## 2023-11-06 ENCOUNTER — NON-APPOINTMENT (OUTPATIENT)
Age: 83
End: 2023-11-06

## 2023-11-29 ENCOUNTER — APPOINTMENT (OUTPATIENT)
Dept: HEMATOLOGY ONCOLOGY | Facility: CLINIC | Age: 83
End: 2023-11-29
Payer: MEDICARE

## 2023-11-29 ENCOUNTER — OUTPATIENT (OUTPATIENT)
Dept: OUTPATIENT SERVICES | Facility: HOSPITAL | Age: 83
LOS: 1 days | End: 2023-11-29
Payer: MEDICARE

## 2023-11-29 ENCOUNTER — LABORATORY RESULT (OUTPATIENT)
Age: 83
End: 2023-11-29

## 2023-11-29 VITALS
TEMPERATURE: 97.8 F | BODY MASS INDEX: 38.71 KG/M2 | WEIGHT: 205 LBS | DIASTOLIC BLOOD PRESSURE: 55 MMHG | HEART RATE: 94 BPM | HEIGHT: 61 IN | SYSTOLIC BLOOD PRESSURE: 106 MMHG

## 2023-11-29 DIAGNOSIS — R10.9 UNSPECIFIED ABDOMINAL PAIN: ICD-10-CM

## 2023-11-29 DIAGNOSIS — D64.9 ANEMIA, UNSPECIFIED: ICD-10-CM

## 2023-11-29 LAB
HCT VFR BLD CALC: 39 %
HGB BLD-MCNC: 13 G/DL
MCHC RBC-ENTMCNC: 33.3 G/DL
MCHC RBC-ENTMCNC: 33.5 PG
MCV RBC AUTO: 100.5 FL
PLATELET # BLD AUTO: 129 K/UL
PMV BLD: 10.5 FL
RBC # BLD: 3.88 M/UL
RBC # FLD: 12.3 %
WBC # FLD AUTO: 9.69 K/UL

## 2023-11-29 PROCEDURE — 99214 OFFICE O/P EST MOD 30 MIN: CPT

## 2023-11-29 PROCEDURE — 80053 COMPREHEN METABOLIC PANEL: CPT

## 2023-11-29 PROCEDURE — 85027 COMPLETE CBC AUTOMATED: CPT

## 2023-11-29 PROCEDURE — 83615 LACTATE (LD) (LDH) ENZYME: CPT

## 2023-11-30 DIAGNOSIS — D64.9 ANEMIA, UNSPECIFIED: ICD-10-CM

## 2023-12-01 LAB
ALBUMIN SERPL ELPH-MCNC: 4.3 G/DL
ALP BLD-CCNC: 60 U/L
ALT SERPL-CCNC: 14 U/L
ANION GAP SERPL CALC-SCNC: 11 MMOL/L
AST SERPL-CCNC: 17 U/L
BILIRUB SERPL-MCNC: <0.2 MG/DL
BUN SERPL-MCNC: 18 MG/DL
CALCIUM SERPL-MCNC: 9.3 MG/DL
CHLORIDE SERPL-SCNC: 106 MMOL/L
CO2 SERPL-SCNC: 24 MMOL/L
CREAT SERPL-MCNC: 0.9 MG/DL
EGFR: 86 ML/MIN/1.73M2
GLUCOSE SERPL-MCNC: 99 MG/DL
LDH SERPL-CCNC: 174 U/L
POTASSIUM SERPL-SCNC: 4.6 MMOL/L
PROT SERPL-MCNC: 6.1 G/DL
SODIUM SERPL-SCNC: 141 MMOL/L

## 2024-02-26 ENCOUNTER — APPOINTMENT (OUTPATIENT)
Dept: HEMATOLOGY ONCOLOGY | Facility: CLINIC | Age: 84
End: 2024-02-26
Payer: MEDICARE

## 2024-02-26 ENCOUNTER — OUTPATIENT (OUTPATIENT)
Dept: OUTPATIENT SERVICES | Facility: HOSPITAL | Age: 84
LOS: 1 days | End: 2024-02-26
Payer: MEDICARE

## 2024-02-26 VITALS
SYSTOLIC BLOOD PRESSURE: 112 MMHG | RESPIRATION RATE: 15 BRPM | HEIGHT: 61 IN | WEIGHT: 200 LBS | BODY MASS INDEX: 37.76 KG/M2 | HEART RATE: 86 BPM | TEMPERATURE: 98.2 F | DIASTOLIC BLOOD PRESSURE: 73 MMHG

## 2024-02-26 DIAGNOSIS — Z51.81 ENCOUNTER FOR THERAPEUTIC DRUG LVL MONITORING: ICD-10-CM

## 2024-02-26 DIAGNOSIS — C91.10 CHRONIC LYMPHOCYTIC LEUKEMIA OF B-CELL TYPE NOT HAVING ACHIEVED REMISSION: ICD-10-CM

## 2024-02-26 LAB
BASOPHILS # BLD AUTO: 0.03 K/UL
BASOPHILS NFR BLD AUTO: 0.4 %
EOSINOPHIL # BLD AUTO: 0.19 K/UL
EOSINOPHIL NFR BLD AUTO: 2.3 %
HCT VFR BLD CALC: 39.7 %
HGB BLD-MCNC: 13.1 G/DL
IMM GRANULOCYTES NFR BLD AUTO: 0.4 %
LYMPHOCYTES # BLD AUTO: 3.77 K/UL
LYMPHOCYTES NFR BLD AUTO: 44.9 %
MAN DIFF?: NORMAL
MCHC RBC-ENTMCNC: 33 G/DL
MCHC RBC-ENTMCNC: 33.6 PG
MCV RBC AUTO: 101.8 FL
MONOCYTES # BLD AUTO: 0.67 K/UL
MONOCYTES NFR BLD AUTO: 8 %
NEUTROPHILS # BLD AUTO: 3.7 K/UL
NEUTROPHILS NFR BLD AUTO: 44 %
PLATELET # BLD AUTO: 139 K/UL
PMV BLD AUTO: NORMAL
RBC # BLD: 3.9 M/UL
RBC # FLD: 12.2 %
WBC # FLD AUTO: 8.39 K/UL

## 2024-02-26 PROCEDURE — 83615 LACTATE (LD) (LDH) ENZYME: CPT

## 2024-02-26 PROCEDURE — G2211 COMPLEX E/M VISIT ADD ON: CPT

## 2024-02-26 PROCEDURE — 80053 COMPREHEN METABOLIC PANEL: CPT

## 2024-02-26 PROCEDURE — 99214 OFFICE O/P EST MOD 30 MIN: CPT

## 2024-02-26 PROCEDURE — 85027 COMPLETE CBC AUTOMATED: CPT

## 2024-02-26 RX ORDER — ACALABRUTINIB 100 MG/1
100 TABLET, FILM COATED ORAL
Qty: 180 | Refills: 1 | Status: ACTIVE | COMMUNITY
Start: 2023-05-12 | End: 1900-01-01

## 2024-02-27 DIAGNOSIS — C91.10 CHRONIC LYMPHOCYTIC LEUKEMIA OF B-CELL TYPE NOT HAVING ACHIEVED REMISSION: ICD-10-CM

## 2024-02-27 LAB
ALBUMIN SERPL ELPH-MCNC: 4.3 G/DL
ALP BLD-CCNC: 59 U/L
ALT SERPL-CCNC: 13 U/L
ANION GAP SERPL CALC-SCNC: 11 MMOL/L
AST SERPL-CCNC: 15 U/L
BILIRUB SERPL-MCNC: 0.3 MG/DL
BUN SERPL-MCNC: 17 MG/DL
CALCIUM SERPL-MCNC: 9.1 MG/DL
CHLORIDE SERPL-SCNC: 103 MMOL/L
CO2 SERPL-SCNC: 25 MMOL/L
CREAT SERPL-MCNC: 1.1 MG/DL
EGFR: 67 ML/MIN/1.73M2
GLUCOSE SERPL-MCNC: 116 MG/DL
LDH SERPL-CCNC: 146 U/L
POTASSIUM SERPL-SCNC: 4.3 MMOL/L
PROT SERPL-MCNC: 6.1 G/DL
SODIUM SERPL-SCNC: 139 MMOL/L

## 2024-02-28 PROBLEM — Z51.81 ENCOUNTER FOR MEDICATION MONITORING: Status: ACTIVE | Noted: 2024-02-28

## 2024-02-29 NOTE — REVIEW OF SYSTEMS
[Fatigue] : fatigue [Negative] : Heme/Lymph [Fever] : no fever [Chills] : no chills [Recent Change In Weight] : ~T no recent weight change [Night Sweats] : no night sweats [Dry Eyes] : no dryness of the eyes [Dysphagia] : no dysphagia [Chest Pain] : no chest pain [Lower Ext Edema] : no lower extremity edema [Shortness Of Breath] : no shortness of breath [SOB on Exertion] : no shortness of breath during exertion [Abdominal Pain] : no abdominal pain [Constipation] : no constipation [Joint Pain] : no joint pain [Skin Rash] : no skin rash [Easy Bleeding] : no tendency for easy bleeding

## 2024-02-29 NOTE — REASON FOR VISIT
[Follow-Up Visit] : a follow-up visit for [Blood Count Assessment] : blood count assessment [Spouse] : spouse [Family Member] : family member [FreeTextEntry2] : CLL

## 2024-02-29 NOTE — HISTORY OF PRESENT ILLNESS
[de-identified] : 01/20/2021 This is a 77 year old male here to establish care. He has a history of asymptomatic CLL. His blood work from 10/27/2015, is showing a white count of 16.8 with lymphocytosis. Hemoglobin was 13.8 and platelets were 226. Folate, T4, TSH, vitamin D were normal. Calcium was 9.1  Normal LFTs. Flow cytometry positive CD5 CD 23 ZAP 70 negative CD38 cw CLL normal FISH.\par  \par  Blood work from last visit on 02/2017 showed WBC of 19.7, lymphs 82%, HgB 13.5 and plts of 190 with MCV 97.5.\par  \par  He has no complaints.\par  \par  Had a colonoscopy 2017 states it was normal and does not want another one.\par  \par  \par   [de-identified] : 12/21/17 CC" Her for follow up for my CLL" He is doing well. Recovering from a cold. [facility]  [facility] showed WBc 23.5, HgB 13.7, Pts 160 CMP b12 345 and foalte was >24, LFT were normal. His HgB from today shows a fall to 12.7. MCV is a bit higher.   He has no B symptoms.  8/28/18 He is here for follow up. He states he is doing well. He has no complaints. I [facility] not get blood work from [facility].  2/19/19 Patient presents for 6 month follow up with [facility]work [facility].  CBC showed WBC 30.6 (lymphocyte percent not calculated), hemoglobin 14.4, and platelets of 168.  He is concerned because his bloodwork from 8/2018 showed WBC of 20 and wonders if he might need treatment.  Complains of increased fatigue since December but no fevers, night sweats, LAD, anorexia.  10/29/19 He is here for follow up. He had blood work in September 25th,  Folate was 8.5,  WBC was 27, Hgb 13.5, Plts 167, ,  PSA  8.4  from 2018. CMP was normal.  CBC from today is stable, slightly worsened anemia at 12.7 WBC is the same. He feels tired but this is chronic, he lost 8 lbs over about one year.   8/5/20 He is here for follow up. he had CBC done on 7/31 in [facility]. WBC 25, Plts 129, Hgb 12.6 He has no complaints. He lost about 10 lbs.   10/7/2020 Patient is here for a follow-up visit for CLL, accompanied by his daughter.  He is feeling well but states that he has been more fatigued lately and had an episode of night sweats last week which have since resolved.  Most recent CBC is stable with mild leukocytosis.  Patient denies fever, chills, nausea, vomiting, new palpable adenopathy or bleeding.  1/20/21 HE is here for follow  up . Since last visit he had CT C/A/P 10/2020 [facility]: Mild adenopathy in chest and axilla, L Splenomegaly with 14.4 cm spleen, he has adenopathy but largest was 3.1 cm  cm in left external iliac node. He had CBC fron on 1/7/21: WBC was 23, Hgb 12.3, Plts 157,   UA is normal. CM is normal. Essentially a stable CBC.   He had a UTI end of December with fevers and night sweats and also his testicle was swollen. He had 10 days of IM and IV antibiotics by Dr. Roche and his sweats were bothersome and he also lost some with  that time. Since than he has gained weight back and only reports 2 mild night sweats in last 2 weeks.  His last UTI was over 20  years back.   4/28/21 He feels well. States night sweats are now maybe at most once a month. No other complaints. CBC is stable   10/27/21 Patient is here for a follow-up visit for CLL, accompanied by his daughter.  He is feeling well but states that he has been more fatigued lately and had an episode of night sweats last week which are intermittent.  Most recent CBC is stable with mild leukocytosis with WBC 28.  Patient denies fever, chills, nausea, vomiting, new palpable adenopathy or bleeding.  [facility] and WBC was 30.7 and B12 was on the lower end of normal so he started B12 supplement.   LAB[facility] ([facility].2021 - [facility]) WBC 30.7, Hgb 12.9, , Cr 0.9, Vit B12 is 254, eGFR 87, PSA 2.83, TSH 3.2, Folate 9.9  4/20/22 He is here for follow up. A few months back he had fatigue and sweats that resolved. He feels fine now. No adenopathy or B symptoms CBC is stable form today. with stable WBC   7/29/22 HE is here for follow up.  Continues to loose weight. He has night sweats that are a bit worse. [facility] 7[facility] WBC 28, Hgb 12 MCV stable at 107, plts 126 had a CBC today that showed stable counts with white blood cell count of 26, hemoglobin of 11.3 and platelets of 128.  I did not feel any adenopathy on exam.  09/02/2022 accompanied by his wife and is here to discuss treatment for CLL progression. Now with more dry cough and increasing night sweats and  appetite decrease. 09/01/2022 had tele visit with Dr. Monica De La Rosa at [address] [place] [address] [phone] who recommended PET prior to treatment initiation and was suggested 3 Rx.. He also had CT C/A/P that showed progressive adenopathy in the retroperitoneal, mild progressive splenomegaly, hepatomegaly as on prior.  Spleen was approximately 15 cm in size the largest lymph node was approximately 1.7 cm in size.  There was also progressive mediastinal and axillary adenopathy.  The largest lymph node was about 14 mm  10/11/2022 He is here for follow up. Since last visit he had PET.CT that did not show any areas for concern for Teran transformation. He continued to have night sweats and he saw Dr. De La Rosa last week for follow up and as per patient it was agreed to start Treatment with Acalabrutinib.  [facility] on [facility], 2022 that showed white blood cell count of 34.6 hemoglobin 12.4 platelets of 111 B12 was 1218 CMP was normal.  11/14/22 He returns for follow up today. He is on Calquence and so far tolerating. He reports his night sweats have reduced in frequency. He does have some bruising along his arm but we explained that its from the medication.   12/30/2022 He is here for follow up; He feels well. has no side effects. No night sweats.  Vitals are  fine. Has a cough.   2/6/23: Patient returns for followup. His CBC showed improvement in ALC with stable hemoglobin and platelet count. He is doing well. His only complaints are fatigue and easy bruising with some ecchymoses on his hands and arms. His night sweats have mostly resolved. HIs weight is stable. [facility]  He went to the dentist recently who noted that a lesion he has on his tongue for 5 years appears minimally enlarged.   5/31/23 He is here for follow up. He is doing well. Gained some weight.  CBC today showed normal WBC counts total, Hgb and plts stable.  Has rare sweats at night,   8/30/23 He returns for follow-up he feels well no longer has night sweats for about a week CBC today shows normal platelets mild anemia normal total white blood cell count and lymphocytosis is also improving hopefully will achieve a CR.  Blood pressure is good and pulse was regular.  11/29/23 He is here for follow up. He is doing well. CBC today with mild anemia and thrombocytopeani maybe from treatment no dose adjustments needed.   2/26/24: Pt is here for follow up. He continues on Calquence withoutt any issues. CBC noted to be normal. He reports mild fatigue. Denied low grade fever, weight loss.

## 2024-02-29 NOTE — END OF VISIT
[] : Fellow [FreeTextEntry3] : He returns for follow-up he continues to be on acalabrutinib for his CLL CBC today looks good with blood blood cell count that is normal hemoglobin 13 platelets normal absolute lymphocyte count is 3700  He has no side effects and will continue with acalabrutinib

## 2024-02-29 NOTE — PHYSICAL EXAM
[Restricted in physically strenuous activity but ambulatory and able to carry out work of a light or sedentary nature] : Status 1- Restricted in physically strenuous activity but ambulatory and able to carry out work of a light or sedentary nature, e.g., light house work, office work [de-identified] : some mild bruising noted on right arm  [Normal] : normal appearance, no rash, nodules, vesicles, ulcers, erythema [de-identified] : Raised erythematous lesion on left aspect of tongue

## 2024-02-29 NOTE — ASSESSMENT
[Patient/Caregiver not ready to engage] : Patient/Caregiver not ready to engage [FreeTextEntry1] : # CLL, Gracia stage II, mild splenomegaly, with adenopathy. Mild anemia and thrombocytopenia on observation for many years. - 07/02/2022 Although his CBC i is stable and no obvious adenopathy on exam he continues to have weight loss and night sweats and I explained to the patient this may be a reason to start treatment but at this time he wishes to continue with observation so we ordered CT chest abdomen and pelvis to evaluate for worsening of adenopathy. He had previous CAT scans done in October of 2020. Night sweats and weight loss has been going on for quite some time just with the symptoms on may be this is a gray area or the worsening of his symptoms may be cannot be considered active disease.  - Review literature and treatment and multiple references were discussed and provided specifically NCCN or leukemia lymphoma Society so he could take read about BTK inhibitors and Bcl-2 inhibitors and anti-CD20 therapy.  - Prior to initiation of treatment we will also repeat FISH, TP53 status (NG), flow cytometry, cytogenetics, and IGHV status for new baseline and this was drawn today on 9/2022 that showed FISH Loss of 3IGH with partial loss of 5IGH detected (89.5%) - see below IGVH mutation negative, LDH normal  09/01/2022 had tele visit with Dr. Monica De La Rosa at [address] [id number] [phone] - recommended PET prior to treatment initiation and as per patient also recommended Acalbrutinib  -PET/CT had no concerning signs for Teran transformation  # elevated PSA , PSA 2.83 from 09/2021 - [facility] [place]; now sees Dr. Roche.  # He had skin cancer removed from his nose. - follows with [facility]ER[facility].  # Tongue lesion: Per patient, present for about 5 years, recently minimally enlarged - If growing will refer to ENT  PLAN: - He was started on acalabrutinib 100 mg PO Q12H in 11/2022 he is aware of side effects and doing well without any issues except mild fatigue. CBC shows continued improvement, normal today. - Hep panel negative from 11/2022 - Labs today: CBC, CMP, LDH -[facility] and [facility] -can hold treatment  3 days prior to any procedure if needed.   RTC in 3 months.

## 2024-04-04 NOTE — REASON FOR VISIT
[Follow-Up Visit] : a follow-up visit for [Blood Count Assessment] : blood count assessment [FreeTextEntry2] : CLL Cigarettes

## 2024-06-04 ENCOUNTER — LABORATORY RESULT (OUTPATIENT)
Age: 84
End: 2024-06-04

## 2024-06-04 ENCOUNTER — OUTPATIENT (OUTPATIENT)
Dept: OUTPATIENT SERVICES | Facility: HOSPITAL | Age: 84
LOS: 1 days | End: 2024-06-04
Payer: MEDICARE

## 2024-06-04 ENCOUNTER — APPOINTMENT (OUTPATIENT)
Age: 84
End: 2024-06-04
Payer: MEDICARE

## 2024-06-04 VITALS
TEMPERATURE: 97.7 F | RESPIRATION RATE: 14 BRPM | HEIGHT: 68 IN | BODY MASS INDEX: 30.31 KG/M2 | DIASTOLIC BLOOD PRESSURE: 74 MMHG | OXYGEN SATURATION: 98 % | SYSTOLIC BLOOD PRESSURE: 115 MMHG | WEIGHT: 200 LBS | HEART RATE: 60 BPM

## 2024-06-04 DIAGNOSIS — Z51.11 ENCOUNTER FOR ANTINEOPLASTIC CHEMOTHERAPY: ICD-10-CM

## 2024-06-04 DIAGNOSIS — C91.10 CHRONIC LYMPHOCYTIC LEUKEMIA OF B-CELL TYPE NOT HAVING ACHIEVED REMISSION: ICD-10-CM

## 2024-06-04 PROCEDURE — G2211 COMPLEX E/M VISIT ADD ON: CPT

## 2024-06-04 PROCEDURE — 85027 COMPLETE CBC AUTOMATED: CPT

## 2024-06-04 PROCEDURE — 99214 OFFICE O/P EST MOD 30 MIN: CPT

## 2024-06-04 PROCEDURE — 80053 COMPREHEN METABOLIC PANEL: CPT

## 2024-06-04 PROCEDURE — 83615 LACTATE (LD) (LDH) ENZYME: CPT

## 2024-06-05 DIAGNOSIS — C91.10 CHRONIC LYMPHOCYTIC LEUKEMIA OF B-CELL TYPE NOT HAVING ACHIEVED REMISSION: ICD-10-CM

## 2024-06-05 PROBLEM — Z51.11 ENCOUNTER FOR ANTINEOPLASTIC CHEMOTHERAPY: Status: ACTIVE | Noted: 2022-11-15

## 2024-06-05 LAB
ALBUMIN SERPL ELPH-MCNC: 4.2 G/DL
ALP BLD-CCNC: 60 U/L
ALT SERPL-CCNC: 11 U/L
ANION GAP SERPL CALC-SCNC: 12 MMOL/L
AST SERPL-CCNC: 16 U/L
BILIRUB SERPL-MCNC: 0.3 MG/DL
BUN SERPL-MCNC: 20 MG/DL
CALCIUM SERPL-MCNC: 8.8 MG/DL
CHLORIDE SERPL-SCNC: 106 MMOL/L
CO2 SERPL-SCNC: 21 MMOL/L
CREAT SERPL-MCNC: 1 MG/DL
EGFR: 75 ML/MIN/1.73M2
GLUCOSE SERPL-MCNC: 107 MG/DL
HCT VFR BLD CALC: 37.6 %
HGB BLD-MCNC: 12.9 G/DL
LDH SERPL-CCNC: 184 U/L
MCHC RBC-ENTMCNC: 33.1 PG
MCHC RBC-ENTMCNC: 34.3 G/DL
MCV RBC AUTO: 96.4 FL
PLATELET # BLD AUTO: 146 K/UL
PMV BLD: 10.3 FL
POTASSIUM SERPL-SCNC: 4.2 MMOL/L
PROT SERPL-MCNC: 5.9 G/DL
RBC # BLD: 3.9 M/UL
RBC # FLD: 12 %
SODIUM SERPL-SCNC: 139 MMOL/L
WBC # FLD AUTO: 8.81 K/UL

## 2024-06-05 NOTE — HISTORY OF PRESENT ILLNESS
[de-identified] : 01/20/2021 This is a 77 year old male here to establish care. He has a history of asymptomatic CLL. His blood work from 10/27/2015, is showing a white count of 16.8 with lymphocytosis. Hemoglobin was 13.8 and platelets were 226. Folate, T4, TSH, vitamin D were normal. Calcium was 9.1  Normal LFTs. Flow cytometry positive CD5 CD 23 ZAP 70 negative CD38 cw CLL normal FISH.\par  \par  Blood work from last visit on 02/2017 showed WBC of 19.7, lymphs 82%, HgB 13.5 and plts of 190 with MCV 97.5.\par  \par  He has no complaints.\par  \par  Had a colonoscopy 2017 states it was normal and does not want another one.\par  \par  \par   [de-identified] : 12/21/17 CC" Her for follow up for my CLL" He is doing well. Recovering from a cold. [facility]  [facility] showed WBc 23.5, HgB 13.7, Pts 160 CMP b12 345 and foalte was >24, LFT were normal. His HgB from today shows a fall to 12.7. MCV is a bit higher.   He has no B symptoms.  8/28/18 He is here for follow up. He states he is doing well. He has no complaints. [facility] not get blood work from [facility].  2/19/19 Patient presents for 6 month follow up with [facility]work [facility]2018.  CBC showed WBC 30.6 (lymphocyte percent not calculated), hemoglobin 14.4, and platelets of 168.  He is concerned because his bloodwork from 8/2018 showed WBC of 20 and wonders if he might need treatment.  Complains of increased fatigue since December but no fevers, night sweats, LAD, anorexia.  10/29/19 He is here for follow up. He had blood work in September 25th,  Folate was 8.5,  WBC was 27, Hgb 13.5, Plts 167, ,  PSA  8.4  from 2018. CMP was normal.  CBC from today is stable, slightly worsened anemia at 12.7 WBC is the same. He feels tired but this is chronic, he lost 8 lbs over about one year.   8/5/20 He is here for follow up. he had CBC done on 7/31 in [facility]. WBC 25, Plts 129, Hgb 12.6 He has no complaints. He lost about 10 lbs.   10/7/2020 Patient is here for a follow-up visit for CLL, accompanied by his daughter.  He is feeling well but states that he has been more fatigued lately and had an episode of night sweats last week which have since resolved.  Most recent CBC is stable with mild leukocytosis.  Patient denies fever, chills, nausea, vomiting, new palpable adenopathy or bleeding.  1/20/21 HE is here for follow  up . Since last visit he had CT C/A/P 10/2020 [facility]: Mild adenopathy in chest and axilla, L Splenomegaly with 14.4 cm spleen, he has adenopathy but largest was 3.1 cm  cm in left external iliac node. He had CBC fron on 1/7/21: WBC was 23, Hgb 12.3, Plts 157,   UA is normal. CM is normal. Essentially a stable CBC.   He had a UTI end of December with fevers and night sweats and also his testicle was swollen. He had 10 days of IM and IV antibiotics by Dr. Roche and his sweats were bothersome and he also lost some with  that time. Since than he has gained weight back and only reports 2 mild night sweats in last 2 weeks.  His last UTI was over 20  years back.   4/28/21 He feels well. States night sweats are now maybe at most once a month. No other complaints. CBC is stable   10/27/21 Patient is here for a follow-up visit for CLL, accompanied by his daughter.  He is feeling well but states that he has been more fatigued lately and had an episode of night sweats last week which are intermittent.  Most recent CBC is stable with mild leukocytosis with WBC 28.  Patient denies fever, chills, nausea, vomiting, new palpable adenopathy or bleeding.  [facility] and WBC was 30.7 and B12 was on the lower end of normal so he started B12 supplement.   [facility] ([facility].2021 - [facility]) WBC 30.7, Hgb 12.9, , Cr 0.9, Vit B12 is 254, eGFR 87, PSA 2.83, TSH 3.2, Folate 9.9  4/20/22 He is here for follow up. A few months back he had fatigue and sweats that resolved. He feels fine now. No adenopathy or B symptoms CBC is stable form today. with stable WBC   7/29/22 HE is here for follow up.  Continues to loose weight. He has night sweats that are a bit worse. [facility] 7[facility] WBC 28, Hgb 12 MCV stable at 107, plts 126 had a CBC today that showed stable counts with white blood cell count of 26, hemoglobin of 11.3 and platelets of 128.  I did not feel any adenopathy on exam.  09/02/2022 accompanied by his wife and is here to discuss treatment for CLL progression. Now with more dry cough and increasing night sweats and  appetite decrease. 09/01/2022 had tele visit with Dr. Monica De La Rosa at [address] [place] [address] [phone] who recommended PET prior to treatment initiation and was suggested 3 Rx.. He also had CT C/A/P that showed progressive adenopathy in the retroperitoneal, mild progressive splenomegaly, hepatomegaly as on prior.  Spleen was approximately 15 cm in size the largest lymph node was approximately 1.7 cm in size.  There was also progressive mediastinal and axillary adenopathy.  The largest lymph node was about 14 mm  10/11/2022 He is here for follow up. Since last visit he had PET.CT that did not show any areas for concern for Teran transformation. He continued to have night sweats and he saw Dr. De La Rosa last week for follow up and as per patient it was agreed to start Treatment with Acalabrutinib.  [facility] 2022 that showed white blood cell count of 34.6 hemoglobin 12.4 platelets of 111 B12 was 1218 CMP was normal.  11/14/22 He returns for follow up today. He is on Calquence and so far tolerating. He reports his night sweats have reduced in frequency. He does have some bruising along his arm but we explained that its from the medication.   12/30/2022 He is here for follow up; He feels well. has no side effects. No night sweats.  Vitals are  fine. Has a cough.   2/6/23: Patient returns for followup. His CBC showed improvement in ALC with stable hemoglobin and platelet count. He is doing well. His only complaints are fatigue and easy bruising with some ecchymoses on his hands and arms. His night sweats have mostly resolved. HIs weight is stable. He [facility]  He went to the dentist recently who noted that a lesion he has on his tongue for 5 years appears minimally enlarged.   5/31/23 He is here for follow up. He is doing well. Gained some weight.  CBC today showed normal WBC counts total, Hgb and plts stable.  Has rare sweats at night,   8/30/23 He returns for follow-up he feels well no longer has night sweats for about a week CBC today shows normal platelets mild anemia normal total white blood cell count and lymphocytosis is also improving hopefully will achieve a CR.  Blood pressure is good and pulse was regular.  11/29/23 He is here for follow up. He is doing well. CBC today with mild anemia and thrombocytopeani maybe from treatment no dose adjustments needed.   2/26/24: Pt is here for follow up. He continues on Calquence withoutt any issues. CBC noted to be normal. He reports mild fatigue. Denied low grade fever, weight loss.   6/5/24 He is here for follow up. He feels well. No issues with CAlquence. He does have a rash mainly on limbs but unclear cause maybe treatment related? CBC is stable. mild anemia

## 2024-06-05 NOTE — REVIEW OF SYSTEMS
[Fever] : no fever [Chills] : no chills [Night Sweats] : no night sweats [Fatigue] : fatigue [Recent Change In Weight] : ~T no recent weight change [Dry Eyes] : no dryness of the eyes [Dysphagia] : no dysphagia [Chest Pain] : no chest pain [Lower Ext Edema] : no lower extremity edema [Shortness Of Breath] : no shortness of breath [SOB on Exertion] : no shortness of breath during exertion [Abdominal Pain] : no abdominal pain [Constipation] : no constipation [Joint Pain] : no joint pain [Skin Rash] : no skin rash [Easy Bleeding] : no tendency for easy bleeding [Negative] : Allergic/Immunologic

## 2024-06-05 NOTE — ASSESSMENT
[FreeTextEntry1] : # CLL, Gracia stage II, mild splenomegaly, with adenopathy. Mild anemia and thrombocytopenia on observation for many years. - 07/02/2022 Although his CBC i is stable and no obvious adenopathy on exam he continues to have weight loss and night sweats and I explained to the patient this may be a reason to start treatment but at this time he wishes to continue with observation so we ordered CT chest abdomen and pelvis to evaluate for worsening of adenopathy. He had previous CAT scans done in October of 2020. Night sweats and weight loss has been going on for quite some time just with the symptoms on may be this is a gray area or the worsening of his symptoms may be cannot be considered active disease.  - Review literature and treatment and multiple references were discussed and provided specifically NCCN or leukemia lymphoma Society so he could take read about BTK inhibitors and Bcl-2 inhibitors and anti-CD20 therapy.  - Prior to initiation of treatment we will also repeat FISH, TP53 status (NG), flow cytometry, cytogenetics, and IGHV status for new baseline and this was drawn today on 9/2022 that showed FISH Loss of 3IGH with partial loss of 5IGH detected (89.5%) - see below IGVH mutation negative, LDH normal  09/01/2022 had tele visit with Dr. Monica De La Rosa at [address] [address] [phone] - recommended PET prior to treatment initiation and as per patient also recommended Acalbrutinib  -PET/CT had no concerning signs for Teran transformation  # elevated PSA , PSA 2.83 from 09/2021 - [facility] [place]; now sees Dr. Roche.  # He had skin cancer removed from his nose. - follows with DERM. -has a rash as well maybe from acala but mild not bothersome mainly on legs   # Tongue lesion: Per patient, present for about 5 years, recently minimally enlarged - If growing will refer to ENT, no issues   PLAN: - He was started on acalabrutinib 100 mg PO Q12H in 11/2022 he is aware of side effects and doing well without any issues except mild fatigue. CBC  likely in CR now - Hep panel negative from 11/2022 - Labs today: CBC, CMP, LDH -[facility] and [facility] -can hold treatment  3 days prior to any procedure if needed.   RTC in 3 months. [Patient/Caregiver not ready to engage] : Patient/Caregiver not ready to engage Patient

## 2024-08-07 NOTE — PHYSICAL EXAM
[Restricted in physically strenuous activity but ambulatory and able to carry out work of a light or sedentary nature] : Status 1- Restricted in physically strenuous activity but ambulatory and able to carry out work of a light or sedentary nature, e.g., light house work, office work [Normal] : affect appropriate [de-identified] : Raised erythematous lesion on left aspect of tongue No

## 2024-09-05 ENCOUNTER — APPOINTMENT (OUTPATIENT)
Age: 84
End: 2024-09-05

## 2024-09-12 ENCOUNTER — LABORATORY RESULT (OUTPATIENT)
Age: 84
End: 2024-09-12

## 2024-09-12 ENCOUNTER — OUTPATIENT (OUTPATIENT)
Dept: OUTPATIENT SERVICES | Facility: HOSPITAL | Age: 84
LOS: 1 days | End: 2024-09-12
Payer: MEDICARE

## 2024-09-12 ENCOUNTER — APPOINTMENT (OUTPATIENT)
Age: 84
End: 2024-09-12
Payer: MEDICARE

## 2024-09-12 VITALS
HEART RATE: 70 BPM | OXYGEN SATURATION: 97 % | BODY MASS INDEX: 30.36 KG/M2 | SYSTOLIC BLOOD PRESSURE: 111 MMHG | DIASTOLIC BLOOD PRESSURE: 62 MMHG | WEIGHT: 200.31 LBS | HEIGHT: 68 IN | TEMPERATURE: 98.3 F

## 2024-09-12 DIAGNOSIS — Z51.81 ENCOUNTER FOR THERAPEUTIC DRUG LVL MONITORING: ICD-10-CM

## 2024-09-12 DIAGNOSIS — C91.10 CHRONIC LYMPHOCYTIC LEUKEMIA OF B-CELL TYPE NOT HAVING ACHIEVED REMISSION: ICD-10-CM

## 2024-09-12 LAB
HCT VFR BLD CALC: 38.3 %
HGB BLD-MCNC: 13 G/DL
MCHC RBC-ENTMCNC: 33.6 PG
MCHC RBC-ENTMCNC: 33.9 G/DL
MCV RBC AUTO: 99 FL
PLATELET # BLD AUTO: 138 K/UL
PMV BLD: 10 FL
RBC # BLD: 3.87 M/UL
RBC # FLD: 12.2 %
WBC # FLD AUTO: 7 K/UL

## 2024-09-12 PROCEDURE — 99214 OFFICE O/P EST MOD 30 MIN: CPT

## 2024-09-12 PROCEDURE — 85027 COMPLETE CBC AUTOMATED: CPT

## 2024-09-12 PROCEDURE — G2211 COMPLEX E/M VISIT ADD ON: CPT

## 2024-09-12 PROCEDURE — 80053 COMPREHEN METABOLIC PANEL: CPT

## 2024-09-13 DIAGNOSIS — C91.10 CHRONIC LYMPHOCYTIC LEUKEMIA OF B-CELL TYPE NOT HAVING ACHIEVED REMISSION: ICD-10-CM

## 2024-09-13 LAB
ALBUMIN SERPL ELPH-MCNC: 4.4 G/DL
ALP BLD-CCNC: 57 U/L
ALT SERPL-CCNC: 12 U/L
ANION GAP SERPL CALC-SCNC: 10 MMOL/L
AST SERPL-CCNC: 15 U/L
BILIRUB SERPL-MCNC: 0.6 MG/DL
BUN SERPL-MCNC: 23 MG/DL
CALCIUM SERPL-MCNC: 8.8 MG/DL
CHLORIDE SERPL-SCNC: 103 MMOL/L
CO2 SERPL-SCNC: 23 MMOL/L
CREAT SERPL-MCNC: 1.3 MG/DL
EGFR: 55 ML/MIN/1.73M2
GLUCOSE SERPL-MCNC: 105 MG/DL
POTASSIUM SERPL-SCNC: 4.5 MMOL/L
PROT SERPL-MCNC: 5.8 G/DL
SODIUM SERPL-SCNC: 136 MMOL/L

## 2024-09-13 NOTE — PHYSICAL EXAM
[Restricted in physically strenuous activity but ambulatory and able to carry out work of a light or sedentary nature] : Status 1- Restricted in physically strenuous activity but ambulatory and able to carry out work of a light or sedentary nature, e.g., light house work, office work [de-identified] : Raised erythematous lesion on left aspect of tongue [Normal] : normal spine exam without palpable tenderness, no kyphosis or scoliosis

## 2024-09-13 NOTE — PHYSICAL EXAM
[Restricted in physically strenuous activity but ambulatory and able to carry out work of a light or sedentary nature] : Status 1- Restricted in physically strenuous activity but ambulatory and able to carry out work of a light or sedentary nature, e.g., light house work, office work [de-identified] : Raised erythematous lesion on left aspect of tongue [Normal] : normal spine exam without palpable tenderness, no kyphosis or scoliosis

## 2024-09-13 NOTE — PHYSICAL EXAM
[Restricted in physically strenuous activity but ambulatory and able to carry out work of a light or sedentary nature] : Status 1- Restricted in physically strenuous activity but ambulatory and able to carry out work of a light or sedentary nature, e.g., light house work, office work [de-identified] : Raised erythematous lesion on left aspect of tongue [Normal] : normal spine exam without palpable tenderness, no kyphosis or scoliosis

## 2024-09-16 NOTE — ASSESSMENT
[Patient/Caregiver not ready to engage] : Patient/Caregiver not ready to engage [FreeTextEntry1] : # CLL, Gracia stage II, mild splenomegaly, with adenopathy. Mild anemia and thrombocytopenia on observation for many years. - 07/02/2022 Although his CBC i is stable and no obvious adenopathy on exam he continues to have weight loss and night sweats and I explained to the patient this may be a reason to start treatment but at this time he wishes to continue with observation so we ordered CT chest abdomen and pelvis to evaluate for worsening of adenopathy. He had previous CAT scans done in October of 2020. Night sweats and weight loss has been going on for quite some time just with the symptoms on may be this is a gray area or the worsening of his symptoms may be cannot be considered active disease. - Review literature and treatment and multiple references were discussed and provided specifically NCCN or leukemia lymphoma Society so he could take read about BTK inhibitors and Bcl-2 inhibitors and anti-CD20 therapy. - Prior to initiation of treatment we will also repeat FISH, TP53 status (NG), flow cytometry, cytogenetics, and IGHV status for new baseline and this was drawn today on 9/2022 that showed FISH Loss of 3IGH with partial loss of 5IGH detected (89.5%) - see below IGVH mutation negative, LDH normal - 09/01/2022 had tele visit with Dr. Monica De La Rosa at [address] [place], [place] [id number] [phone] - recommended PET prior to treatment initiation and as per patient also recommended Acalbrutinib. - PET/CT had no concerning signs for Teran transformation. - 11/2022 HEP panel - negative. - 11/2022 started acalabrutinib 100 mg PO Q12H.  # elevated PSA  - 09/2021 PSA 2.83 - [facility] [place]; now sees Dr. Roche.  # He had skin cancer removed from his nose. - follows with DERM. -has a rash as well maybe from acala but mild not bothersome mainly on legs   # Tongue lesion. - Per patient, present since before 2018. - recently minimally enlarged - If growing will refer to ENT, no issues.  # [facility] and [facility].  09/12/2024 Labs reviewed and results discussed with the patient and his wife; he is likely in remission - remains clinically stable to continue current management. All questions were answered to satisfaction.  PLAN: - continue acalabrutinib 100 mg PO Q12H. - hold treatment for 3 days prior to any procedure if needed. - F/U with dermatology and cataract surgery. - Labs today: CBC, CMP, LDH RTC in 3 months with CBC CMP LDH

## 2024-09-16 NOTE — HISTORY OF PRESENT ILLNESS
[de-identified] : 01/20/2021 This is a 77 year old male here to establish care. He has a history of asymptomatic CLL. His blood work from 10/27/2015, is showing a white count of 16.8 with lymphocytosis. Hemoglobin was 13.8 and platelets were 226. Folate, T4, TSH, vitamin D were normal. Calcium was 9.1  Normal LFTs. Flow cytometry positive CD5 CD 23 ZAP 70 negative CD38 cw CLL normal FISH.\par  \par  Blood work from last visit on 02/2017 showed WBC of 19.7, lymphs 82%, HgB 13.5 and plts of 190 with MCV 97.5.\par  \par  He has no complaints.\par  \par  Had a colonoscopy 2017 states it was normal and does not want another one.\par  \par  \par   [de-identified] : 12/21/17 CC" Her for follow up for my CLL" He is doing well. Recovering from a cold. [facility] showed WBC 23.5, HGB 13.7,  B12 345 and folate was >24, LFT were normal. His HGB from today shows a fall to 12.7. MCV is a bit higher.  He has no B symptoms.  8/28/18 He is here for follow up. He states he is doing well. He has no complaints. [facility] not get blood work from [facility].  2/19/19 Patient presents for 6 month follow up with [facility]work [facility]. CBC showed WBC 30.6 (lymphocyte percent not calculated), hemoglobin 14.4, and platelets of 168. He is concerned because his bloodwork from 8/2018 showed WBC of 20 and wonders if he might need treatment. Complains of increased fatigue since December but no fevers, night sweats, LAD, anorexia.  10/29/19 He is here for follow up. He had blood work in September 25th, Folate was 8.5, WBC was 27, Hgb 13.5, Plts 167, , PSA 8.4 from 2018. CMP was normal. CBC from today is stable, slightly worsened anemia at 12.7 WBC is the same. He feels tired but this is chronic, he lost 8 lbs over about one year.   8/5/20 He is here for follow up. he had CBC done on 7/31 in [facility]. WBC 25, Plts 129, Hgb 12.6 He has no complaints. He lost about 10 lbs.   10/7/2020 Patient is here for a follow-up visit for CLL, accompanied by his daughter. He is feeling well but states that he has been more fatigued lately and had an episode of night sweats last week which have since resolved. Most recent CBC is stable with mild leukocytosis. Patient denies fever, chills, nausea, vomiting, new palpable adenopathy or bleeding.  1/20/21 He is here for follow up. Since last visit he had CT C/A/P 10/2020 [facility]: Mild adenopathy in chest and axilla, L Splenomegaly with 14.4 cm spleen, he has adenopathy but largest was 3.1 cm in left external iliac node. He had CBC from on 1/7/21: WBC was 23, Hgb 12.3, Plts 157,  UA is normal. CM is normal. Essentially a stable CBC. He had a UTI end of December with fevers and night sweats and also his testicle was swollen. He had 10 days of IM and IV antibiotics by Dr. Roche and his sweats were bothersome and he also lost some with that time. Since than he has gained weight back and only reports 2 mild night sweats in last 2 weeks.  His last UTI was over 20  years back.   4/28/21 He feels well. States night sweats are now maybe at most once a month. No other complaints. CBC is stable   10/27/21 Patient is here for a follow-up visit for CLL, accompanied by his daughter. He is feeling well but states that he has been more fatigued lately and had an episode of night sweats last week which are intermittent. Most recent CBC is stable with mild leukocytosis with WBC 28. Patient denies fever, chills, nausea, vomiting, new palpable adenopathy or bleeding. [facility] and WBC was 30.7 and B12 was on the lower end of normal so he started B12 supplement.   [facility] ([facility].2021 - [facility]) WBC 30.7, Hgb 12.9, , Cr 0.9, Vit B12 is 254, eGFR 87, PSA 2.83, TSH 3.2, Folate 9.9  4/20/22 He is here for follow up. A few months back he had fatigue and sweats that resolved. He feels fine now. No adenopathy or B symptoms CBC is stable form today. with stable WBC   7/29/22 HE is here for follow up. Continues to lose weight. He has night sweats that are a bit worse. [facility] 7[facility] WBC 28, Hgb 12 MCV stable at 107,  had a CBC today that showed stable counts with white blood cell count of 26, hemoglobin of 11.3 and platelets of 128. I did not feel any adenopathy on exam.  09/02/2022 accompanied by his wife and is here to discuss treatment for CLL progression. Now with more dry cough and increasing night sweats and  appetite decrease. 09/01/2022 had tele visit with Dr. Monica De La Rosa at [address] [place] [address] [phone] who recommended PET prior to treatment initiation and was suggested 3 Rx.. He also had CT C/A/P that showed progressive adenopathy in the retroperitoneal, mild progressive splenomegaly, hepatomegaly as on prior. Spleen was approximately 15 cm in size the largest lymph node was approximately 1.7 cm in size. There was also progressive mediastinal and axillary adenopathy. The largest lymph node was about 14 mm.  10/11/2022 He is here for follow up. Since last visit he had PET.CT that did not show any areas for concern for Teran transformation. He continued to have night sweats and he saw Dr. De La Rosa last week for follow up and as per patient it was agreed to start Treatment with Acalabrutinib. [facility]22 that showed white blood cell count of 34.6 hemoglobin 12.4 platelets of 111 B12 was 1218 CMP was normal.  11/14/22 He returns for follow up today. He is on Calquence and so far tolerating. He reports his night sweats have reduced in frequency. He does have some bruising along his arm but we explained that its from the medication.   12/30/2022 He is here for follow up; He feels well. has no side effects. No night sweats. Vitals are fine. Has a cough.   2/6/23: Patient returns for follow-up. His CBC showed improvement in ALC with stable hemoglobin and platelet count. He is doing well. His only complaints are fatigue and easy bruising with some ecchymoses on his hands and arms. His night sweats have mostly resolved. HIs weight is stable. [facility]  He went to the dentist recently who noted that a lesion he has on his tongue for 5 years appears minimally enlarged.   5/31/23 He is here for follow up. He is doing well. Gained some weight. CBC today showed normal WBC counts total, Hgb and PLT stable. Has rare sweats at night.  8/30/23 He returns for follow-up he feels well no longer has night sweats for about a week CBC today shows normal platelets mild anemia normal total white blood cell count and lymphocytosis is also improving hopefully will achieve a CR. Blood pressure is good and pulse was regular.  11/29/23 He is here for follow up. He is doing well. CBC today with mild anemia and thrombocytopeani maybe from treatment no dose adjustments needed.   2/26/24: Pt is here for follow up. He continues on Calquence withoutt any issues. CBC noted to be normal. He reports mild fatigue. Denied low grade fever, weight loss.   6/5/24 He is here for follow up. He feels well. No issues with CAlquence. He does have a rash mainly on limbs but unclear cause maybe treatment related? CBC is stable. mild anemia.  09/12/2024 accompanied by his wife; Reports feeling well at the baseline of his health status, no changes in chronic conditions or new complaints since the last visit.

## 2024-09-16 NOTE — HISTORY OF PRESENT ILLNESS
[de-identified] : 01/20/2021 This is a 77 year old male here to establish care. He has a history of asymptomatic CLL. His blood work from 10/27/2015, is showing a white count of 16.8 with lymphocytosis. Hemoglobin was 13.8 and platelets were 226. Folate, T4, TSH, vitamin D were normal. Calcium was 9.1  Normal LFTs. Flow cytometry positive CD5 CD 23 ZAP 70 negative CD38 cw CLL normal FISH.\par  \par  Blood work from last visit on 02/2017 showed WBC of 19.7, lymphs 82%, HgB 13.5 and plts of 190 with MCV 97.5.\par  \par  He has no complaints.\par  \par  Had a colonoscopy 2017 states it was normal and does not want another one.\par  \par  \par   [de-identified] : 12/21/17 CC" Her for follow up for my CLL" He is doing well. Recovering from a cold. [facility] showed WBC 23.5, HGB 13.7,  B12 345 and folate was >24, LFT were normal. His HGB from today shows a fall to 12.7. MCV is a bit higher.  He has no B symptoms.  8/28/18 He is here for follow up. He states he is doing well. He has no complaints. [facility] not get [facility] work from [facility].  2/19/19 Patient presents for 6 month follow up with [facility]2018. CBC showed WBC 30.6 (lymphocyte percent not calculated), hemoglobin 14.4, and platelets of 168. He is concerned because his bloodwork from 8/2018 showed WBC of 20 and wonders if he might need treatment. Complains of increased fatigue since December but no fevers, night sweats, LAD, anorexia.  10/29/19 He is here for follow up. He had blood work in September 25th, Folate was 8.5, WBC was 27, Hgb 13.5, Plts 167, , PSA 8.4 from 2018. CMP was normal. CBC from today is stable, slightly worsened anemia at 12.7 WBC is the same. He feels tired but this is chronic, he lost 8 lbs over about one year.   8/5/20 He is here for follow up. he had CBC done on 7/31 [facility]. WBC 25, Plts 129, Hgb 12.6 He has no complaints. He lost about 10 lbs.   10/7/2020 Patient is here for a follow-up visit for CLL, accompanied by his daughter. He is feeling well but states that he has been more fatigued lately and had an episode of night sweats last week which have since resolved. Most recent CBC is stable with mild leukocytosis. Patient denies fever, chills, nausea, vomiting, new palpable adenopathy or bleeding.  1/20/21 He is here for follow up. Since last visit he had CT C/A/P 10/2020 [facility]: Mild adenopathy in chest and axilla, L Splenomegaly with 14.4 cm spleen, he has adenopathy but largest was 3.1 cm in left external iliac node. He had CBC from on 1/7/21: WBC was 23, Hgb 12.3, Plts 157,  UA is normal. CM is normal. Essentially a stable CBC. He had a UTI end of December with fevers and night sweats and also his testicle was swollen. He had 10 days of IM and IV antibiotics by Dr. Roche and his sweats were bothersome and he also lost some with that time. Since than he has gained weight back and only reports 2 mild night sweats in last 2 weeks.  His last UTI was over 20  years back.   4/28/21 He feels well. States night sweats are now maybe at most once a month. No other complaints. CBC is stable   10/27/21 Patient is here for a follow-up visit for CLL, accompanied by his daughter. He is feeling well but states that he has been more fatigued lately and had an episode of night sweats last week which are intermittent. Most recent CBC is stable with mild leukocytosis with WBC 28. Patient denies fever, chills, nausea, vomiting, new palpable adenopathy or bleeding. [facility] and WBC was 30.7 and B12 was on the lower end of normal so he started B12 supplement.   [facility] ([facility].2021 - [facility]) WBC 30.7, Hgb 12.9, , Cr 0.9, Vit B12 is 254, eGFR 87, PSA 2.83, TSH 3.2, Folate 9.9  4/20/22 He is here for follow up. A few months back he had fatigue and sweats that resolved. He feels fine now. No adenopathy or B symptoms CBC is stable form today. with stable WBC   7/29/22 HE is here for follow up. Continues to lose weight. He has night sweats that are a bit worse. [facility] 7[facility] WBC 28, Hgb 12 MCV stable at 107,  had a CBC today that showed stable counts with white blood cell count of 26, hemoglobin of 11.3 and platelets of 128. I did not feel any adenopathy on exam.  09/02/2022 accompanied by his wife and is here to discuss treatment for CLL progression. Now with more dry cough and increasing night sweats and  appetite decrease. 09/01/2022 had tele visit with Dr. Monica De La Rosa at [address] [place] [address] [phone] who recommended PET prior to treatment initiation and was suggested 3 Rx.. He also had CT C/A/P that showed progressive adenopathy in the retroperitoneal, mild progressive splenomegaly, hepatomegaly as on prior. Spleen was approximately 15 cm in size the largest lymph node was approximately 1.7 cm in size. There was also progressive mediastinal and axillary adenopathy. The largest lymph node was about 14 mm.  10/11/2022 He is here for follow up. Since last visit he had PET.CT that did not show any areas for concern for Teran transformation. He continued to have night sweats and he saw Dr. De La Rosa last week for follow up and as per patient it was agreed to start Treatment with Acalabrutinib. [facility]22 that showed white blood cell count of 34.6 hemoglobin 12.4 platelets of 111 B12 was 1218 CMP was normal.  11/14/22 He returns for follow up today. He is on Calquence and so far tolerating. He reports his night sweats have reduced in frequency. He does have some bruising along his arm but we explained that its from the medication.   12/30/2022 He is here for follow up; He feels well. has no side effects. No night sweats. Vitals are fine. Has a cough.   2/6/23: Patient returns for follow-up. His CBC showed improvement in [facility] with stable hemoglobin and platelet count. He is doing well. His only complaints are fatigue and easy bruising with some ecchymoses on his hands and arms. His night sweats have mostly resolved. HIs weight is stable. [facility]  He went to the dentist recently who noted that a lesion he has on his tongue for 5 years appears minimally enlarged.   5/31/23 He is here for follow up. He is doing well. Gained some weight. CBC today showed normal WBC counts total, Hgb and PLT stable. Has rare sweats at night.  8/30/23 He returns for follow-up he feels well no longer has night sweats for about a week CBC today shows normal platelets mild anemia normal total white blood cell count and lymphocytosis is also improving hopefully will achieve a CR. Blood pressure is good and pulse was regular.  11/29/23 He is here for follow up. He is doing well. CBC today with mild anemia and thrombocytopeani maybe from treatment no dose adjustments needed.   2/26/24: Pt is here for follow up. He continues on Calquence withoutt any issues. CBC noted to be normal. He reports mild fatigue. Denied low grade fever, weight loss.   6/5/24 He is here for follow up. He feels well. No issues with CAlquence. He does have a rash mainly on limbs but unclear cause maybe treatment related? CBC is stable. mild anemia.  09/12/2024 accompanied by his wife; Reports feeling well at the baseline of his health status, no changes in chronic conditions or new complaints since the last visit.

## 2024-09-16 NOTE — ASSESSMENT
[Patient/Caregiver not ready to engage] : Patient/Caregiver not ready to engage [FreeTextEntry1] : # CLL, Gracia stage II, mild splenomegaly, with adenopathy. Mild anemia and thrombocytopenia on observation for many years. - 07/02/2022 Although his CBC i is stable and no obvious adenopathy on exam he continues to have weight loss and night sweats and I explained to the patient this may be a reason to start treatment but at this time he wishes to continue with observation so we ordered CT chest abdomen and pelvis to evaluate for worsening of adenopathy. He had previous CAT scans done in October of 2020. Night sweats and weight loss has been going on for quite some time just with the symptoms on may be this is a gray area or the worsening of his symptoms may be cannot be considered active disease. - Review literature and treatment and multiple references were discussed and provided specifically NCCN or leukemia lymphoma Society so he could take read about BTK inhibitors and Bcl-2 inhibitors and anti-CD20 therapy. - Prior to initiation of treatment we will also repeat FISH, TP53 status (NG), flow cytometry, cytogenetics, and IGHV status for new baseline and this was drawn today on 9/2022 that showed FISH Loss of 3IGH with partial loss of 5IGH detected (89.5%) - see below IGVH mutation negative, LDH normal - 09/01/2022 had tele visit with Dr. Monica De La Rosa at [address] [place], [place] [id number] [phone] - recommended PET prior to treatment initiation and as per patient also recommended Acalbrutinib. - PET/CT had no concerning signs for Teran transformation. - 11/2022 HEP panel - negative. - 11/2022 started acalabrutinib 100 mg PO Q12H.  # elevated PSA  - 09/2021 PSA 2.83 - [facility] [place]; now sees Dr. Roche.  # He had skin cancer removed from his nose. - follows with DERM. -has a rash as well maybe from acala but mild not bothersome mainly on legs   # Tongue lesion. - Per patient, present since before 2018. - recently minimally enlarged - If growing will refer to ENT, no issues.  # [facility] and had colonoscopy.  09/12/2024 Labs reviewed and results discussed with the patient and his wife; he is likely in remission - remains clinically stable to continue current management. All questions were answered to satisfaction.  PLAN: - continue acalabrutinib 100 mg PO Q12H. - hold treatment for 3 days prior to any procedure if needed. - F/U with dermatology and cataract surgery. - Labs today: CBC, CMP, LDH RTC in 3 months with CBC CMP LDH

## 2024-09-16 NOTE — REVIEW OF SYSTEMS
[Fatigue] : fatigue [Negative] : Allergic/Immunologic [Dysphagia] : no dysphagia [Chest Pain] : no chest pain [Lower Ext Edema] : no lower extremity edema [Shortness Of Breath] : no shortness of breath [SOB on Exertion] : no shortness of breath during exertion [Abdominal Pain] : no abdominal pain [Constipation] : no constipation [Joint Pain] : no joint pain [Skin Rash] : no skin rash [Easy Bleeding] : no tendency for easy bleeding [Fever] : no fever [Chills] : no chills [Night Sweats] : no night sweats [Recent Change In Weight] : ~T no recent weight change [Dry Eyes] : no dryness of the eyes

## 2024-09-16 NOTE — HISTORY OF PRESENT ILLNESS
[de-identified] : 01/20/2021 This is a 77 year old male here to establish care. He has a history of asymptomatic CLL. His blood work from 10/27/2015, is showing a white count of 16.8 with lymphocytosis. Hemoglobin was 13.8 and platelets were 226. Folate, T4, TSH, vitamin D were normal. Calcium was 9.1  Normal LFTs. Flow cytometry positive CD5 CD 23 ZAP 70 negative CD38 cw CLL normal FISH.\par  \par  Blood work from last visit on 02/2017 showed WBC of 19.7, lymphs 82%, HgB 13.5 and plts of 190 with MCV 97.5.\par  \par  He has no complaints.\par  \par  Had a colonoscopy 2017 states it was normal and does not want another one.\par  \par  \par   [de-identified] : 12/21/17 CC" Her for follow up for my CLL" He is doing well. Recovering from a cold. [facility] WBC 23.5, HGB 13.7,  B12 345 and folate was >24, LFT were normal. His HGB from today shows a fall to 12.7. MCV is a bit higher.  He has no B symptoms.  8/28/18 He is here for follow up. He states he is doing well. He has no complaints. [facility] not get [facility] work [facility].  2/19/19 Patient presents for 6 month follow up with [facility]. CBC showed WBC 30.6 (lymphocyte percent not calculated), hemoglobin 14.4, and platelets of 168. He is concerned because his bloodwork from 8/2018 showed WBC of 20 and wonders if he might need treatment. Complains of increased fatigue since December but no fevers, night sweats, LAD, anorexia.  10/29/19 He is here for follow up. He had blood work in September 25th, Folate was 8.5, WBC was 27, Hgb 13.5, Plts 167, , PSA 8.4 from 2018. CMP was normal. CBC from today is stable, slightly worsened anemia at 12.7 WBC is the same. He feels tired but this is chronic, he lost 8 lbs over about one year.   8/5/20 He is here for follow up. he had CBC done on 7/31 in [facility]. WBC 25, Plts 129, Hgb 12.6 He has no complaints. He lost about 10 lbs.   10/7/2020 Patient is here for a follow-up visit for CLL, accompanied by his daughter. He is feeling well but states that he has been more fatigued lately and had an episode of night sweats last week which have since resolved. Most recent CBC is stable with mild leukocytosis. Patient denies fever, chills, nausea, vomiting, new palpable adenopathy or bleeding.  1/20/21 He is here for follow up. Since last visit he had CT C/A/P 10/2020 [facility]: Mild adenopathy in chest and axilla, L Splenomegaly with 14.4 cm spleen, he has adenopathy but largest was 3.1 cm in left external iliac node. He had CBC from on 1/7/21: WBC was 23, Hgb 12.3, Plts 157,  UA is normal. CM is normal. Essentially a stable CBC. He had a UTI end of December with fevers and night sweats and also his testicle was swollen. He had 10 days of IM and IV antibiotics by Dr. Roche and his sweats were bothersome and he also lost some with that time. Since than he has gained weight back and only reports 2 mild night sweats in last 2 weeks.  His last UTI was over 20  years back.   4/28/21 He feels well. States night sweats are now maybe at most once a month. No other complaints. CBC is stable   10/27/21 Patient is here for a follow-up visit for CLL, accompanied by his daughter. He is feeling well but states that he has been more fatigued lately and had an episode of night sweats last week which are intermittent. Most recent CBC is stable with mild leukocytosis with WBC 28. Patient denies fever, chills, nausea, vomiting, new palpable adenopathy or bleeding. [facility] and WBC was 30.7 and B12 was on the lower end of normal so he started B12 supplement.   [facility]) WBC 30.7, Hgb 12.9, , Cr 0.9, Vit B12 is 254, eGFR 87, PSA 2.83, TSH 3.2, Folate 9.9  4/20/22 He is here for follow up. A few months back he had fatigue and sweats that resolved. He feels fine now. No adenopathy or B symptoms CBC is stable form today. with stable WBC   7/29/22 HE is here for follow up. Continues to lose weight. He has night sweats that are a bit worse. [facility] WBC 28, Hgb 12 MCV stable at 107,  had a CBC today that showed stable counts with white blood cell count of 26, hemoglobin of 11.3 and platelets of 128. I did not feel any adenopathy on exam.  09/02/2022 accompanied by his wife and is here to discuss treatment for CLL progression. Now with more dry cough and increasing night sweats and  appetite decrease. 09/01/2022 had tele visit with Dr. Monica De La Rosa at [address] [place] [address] [phone] who recommended PET prior to treatment initiation and was suggested 3 Rx.. He also had CT C/A/P that showed progressive adenopathy in the retroperitoneal, mild progressive splenomegaly, hepatomegaly as on prior. Spleen was approximately 15 cm in size the largest lymph node was approximately 1.7 cm in size. There was also progressive mediastinal and axillary adenopathy. The largest lymph node was about 14 mm.  10/11/2022 He is here for follow up. Since last visit he had PET.CT that did not show any areas for concern for Teran transformation. He continued to have night sweats and he saw Dr. De La Rosa last week for follow up and as per patient it was agreed to start Treatment with Acalabrutinib. [facility] that showed white blood cell count of 34.6 hemoglobin 12.4 platelets of 111 B12 was 1218 CMP was normal.  11/14/22 He returns for follow up today. He is on Calquence and so far tolerating. He reports his night sweats have reduced in frequency. He does have some bruising along his arm but we explained that its from the medication.   12/30/2022 He is here for follow up; He feels well. has no side effects. No night sweats. Vitals are fine. Has a cough.   2/6/23: Patient returns for follow-up. His CBC showed improvement in ALC with stable hemoglobin and platelet count. He is doing well. His only complaints are fatigue and easy bruising with some ecchymoses on his hands and arms. His night sweats have mostly resolved. HIs weight is stable. [facility]  He went to the dentist recently who noted that a lesion he has on his tongue for 5 years appears minimally enlarged.   5/31/23 He is here for follow up. He is doing well. Gained some weight. CBC today showed normal WBC counts total, Hgb and PLT stable. Has rare sweats at night.  8/30/23 He returns for follow-up he feels well no longer has night sweats for about a week CBC today shows normal platelets mild anemia normal total white blood cell count and lymphocytosis is also improving hopefully will achieve a CR. Blood pressure is good and pulse was regular.  11/29/23 He is here for follow up. He is doing well. CBC today with mild anemia and thrombocytopeani maybe from treatment no dose adjustments needed.   2/26/24: Pt is here for follow up. He continues on Calquence withoutt any issues. CBC noted to be normal. He reports mild fatigue. Denied low grade fever, weight loss.   6/5/24 He is here for follow up. He feels well. No issues with CAlquence. He does have a rash mainly on limbs but unclear cause maybe treatment related? CBC is stable. mild anemia.  09/12/2024 accompanied by his wife; Reports feeling well at the baseline of his health status, no changes in chronic conditions or new complaints since the last visit.

## 2024-09-16 NOTE — END OF VISIT
[FreeTextEntry3] : I was physically present for the key portions of the evaluation and management service provided.  I agree with the history and physical, and plan which I have reviewed and edited where appropriate.  He is here for follow up doing well. CBC is stable no new issues. c/w Acalabrutinib for CLL. RTC in 3 months

## 2024-09-16 NOTE — ASSESSMENT
[Patient/Caregiver not ready to engage] : Patient/Caregiver not ready to engage [FreeTextEntry1] : # CLL, Gracia stage II, mild splenomegaly, with adenopathy. Mild anemia and thrombocytopenia on observation for many years. - 07/02/2022 Although his CBC i is stable and no obvious adenopathy on exam he continues to have weight loss and night sweats and I explained to the patient this may be a reason to start treatment but at this time he wishes to continue with observation so we ordered CT chest abdomen and pelvis to evaluate for worsening of adenopathy. He had previous CAT scans done in October of 2020. Night sweats and weight loss has been going on for quite some time just with the symptoms on may be this is a gray area or the worsening of his symptoms may be cannot be considered active disease. - Review literature and treatment and multiple references were discussed and provided specifically NCCN or leukemia lymphoma Society so he could take read about BTK inhibitors and Bcl-2 inhibitors and anti-CD20 therapy. - Prior to initiation of treatment we will also repeat FISH, TP53 status (NG), flow cytometry, cytogenetics, and IGHV status for new baseline and this was drawn today on 9/2022 that showed FISH Loss of 3IGH with partial loss of 5IGH detected (89.5%) - see below IGVH mutation negative, LDH normal - 09/01/2022 had tele visit with Dr. Monica De La Rosa at [address] [place], [place] [id number] [phone] - recommended PET prior to treatment initiation and as per patient also recommended Acalbrutinib. - PET/CT had no concerning signs for Teran transformation. - 11/2022 HEP panel - negative. - 11/2022 started acalabrutinib 100 mg PO Q12H.  # elevated PSA  - 09/2021 PSA 2.83 - [facility] [place]; now sees Dr. Roche.  # He had skin cancer removed from his nose. - follows with DERM. -has a rash as well maybe from acala but mild not bothersome mainly on legs   # Tongue lesion. - Per patient, present since before 2018. - recently minimally enlarged - If growing will refer to ENT, no issues.  # [facility]CM in [facility] and had colonoscopy.  09/12/2024 Labs reviewed and results discussed with the patient and his wife; he is likely in remission - remains clinically stable to continue current management. All questions were answered to satisfaction.  PLAN: - continue acalabrutinib 100 mg PO Q12H. - hold treatment for 3 days prior to any procedure if needed. - F/U with dermatology and cataract surgery. - Labs today: CBC, CMP, LDH RTC in 3 months with CBC CMP LDH

## 2024-12-05 ENCOUNTER — LABORATORY RESULT (OUTPATIENT)
Age: 84
End: 2024-12-05

## 2024-12-05 ENCOUNTER — APPOINTMENT (OUTPATIENT)
Age: 84
End: 2024-12-05
Payer: MEDICARE

## 2024-12-05 ENCOUNTER — OUTPATIENT (OUTPATIENT)
Dept: OUTPATIENT SERVICES | Facility: HOSPITAL | Age: 84
LOS: 1 days | End: 2024-12-05
Payer: MEDICARE

## 2024-12-05 DIAGNOSIS — Z51.11 ENCOUNTER FOR ANTINEOPLASTIC CHEMOTHERAPY: ICD-10-CM

## 2024-12-05 DIAGNOSIS — C91.10 CHRONIC LYMPHOCYTIC LEUKEMIA OF B-CELL TYPE NOT HAVING ACHIEVED REMISSION: ICD-10-CM

## 2024-12-05 PROCEDURE — 99214 OFFICE O/P EST MOD 30 MIN: CPT

## 2024-12-05 PROCEDURE — G2211 COMPLEX E/M VISIT ADD ON: CPT

## 2024-12-05 PROCEDURE — 85027 COMPLETE CBC AUTOMATED: CPT

## 2024-12-05 PROCEDURE — 80053 COMPREHEN METABOLIC PANEL: CPT

## 2024-12-06 DIAGNOSIS — C91.10 CHRONIC LYMPHOCYTIC LEUKEMIA OF B-CELL TYPE NOT HAVING ACHIEVED REMISSION: ICD-10-CM

## 2024-12-06 LAB
ALBUMIN SERPL ELPH-MCNC: 4 G/DL
ALP BLD-CCNC: 57 U/L
ALT SERPL-CCNC: 13 U/L
ANION GAP SERPL CALC-SCNC: 9 MMOL/L
AST SERPL-CCNC: 15 U/L
BILIRUB SERPL-MCNC: 0.4 MG/DL
BUN SERPL-MCNC: 18 MG/DL
CALCIUM SERPL-MCNC: 8.6 MG/DL
CHLORIDE SERPL-SCNC: 105 MMOL/L
CO2 SERPL-SCNC: 26 MMOL/L
CREAT SERPL-MCNC: 1.1 MG/DL
EGFR: 67 ML/MIN/1.73M2
GLUCOSE SERPL-MCNC: 103 MG/DL
HCT VFR BLD CALC: 39.4 %
HGB BLD-MCNC: 13.2 G/DL
MCHC RBC-ENTMCNC: 33.5 G/DL
MCHC RBC-ENTMCNC: 33.6 PG
MCV RBC AUTO: 100.3 FL
PLATELET # BLD AUTO: 137 K/UL
PMV BLD: 9.7 FL
POTASSIUM SERPL-SCNC: 4.7 MMOL/L
PROT SERPL-MCNC: 5.8 G/DL
RBC # BLD: 3.93 M/UL
RBC # FLD: 12.1 %
SODIUM SERPL-SCNC: 140 MMOL/L
WBC # FLD AUTO: 6.96 K/UL

## 2024-12-13 VITALS
OXYGEN SATURATION: 96 % | SYSTOLIC BLOOD PRESSURE: 135 MMHG | DIASTOLIC BLOOD PRESSURE: 74 MMHG | HEART RATE: 83 BPM | RESPIRATION RATE: 17 BRPM

## 2024-12-13 NOTE — H&P CARDIOLOGY - ATTENDING COMMENTS
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  We had a lengthy discussion about the options of observation and medical therapy and f/u vs LHC, patient decided to have the LHC and was scheduled for today.  he is stable to have the procedure done  will give a dose of ASA 81 mg today and then will decide on the potential revascularization after cardiac Cath.

## 2024-12-13 NOTE — H&P CARDIOLOGY - HISTORY OF PRESENT ILLNESS
Patient is a 84y Male PMH of HTN, BPH. Pt reports   NST showed moderate partially reversible apex and inferior apical defect. Patient presents today for [facility] with possible intervention.     Pre cath note:  indication:  [ ] STEMI                [ ] NSTEMI                 [ ] Acute coronary syndrome                   [ ]Unstable Angina   [ ] high risk  [ ] intermediate risk  [ ] low risk                   [ ] Stable Angina     non-invasive testing:     NST     Date:                     result: [ ] high risk  [x ] intermediate risk  [ ] low risk    Anti- Anginal medications:                    [ ] not used d/t                     [ ] used   ( ) BB     ( ) CCB      ( ) Nitrate   (  ) Ranexa          [ ] not used but strong indication not to use    Ejection Fraction                   [ ] <29            [ ] 30-39%   [ ] 40-49%     [x ]>50%    CHF          [ ] active (within last 14 days on meds   [ ] Chronic (on meds but no exacerbation)  NYHA Functional Class:  (  ) Class I (no limitations)  (  ) Class II (slight limitation)  (  ) Class III (marked limitation)  (  ) Class IV (symptoms at rest)    COPD                   [ ] mild (on chronic bronchodilators)  [ ] moderate (on chronic steroid therapy)      [ ] severe (indication for home O2 or PACO2 >50)    Other risk factors:                     [ ] Previous MI                     [ ] CVA/ stroke                    [ ] carotid stent/ CEA                    [ ] PVD/PAD- (arterial aneurysm, non-palpable pulses, tortuous vessel with inability to insert catheter, infra-renal dissection, renal or subclavian artery stenosis)                    [ ] previous CABG                    [ ] Renal Failure:  on HD  (  ) yes  (  ) no                    [ ] Diabetic  (  ) Type 1  (  ) Type 2                                         (  ) Insulin dependent  (  ) non-insulin dependent                                         (  ) Metformin  (  ) Januvia  (  ) Glimepiride  (  ) Glipizide  (  ) Glyburide  (  ) Actos                                         (  ) GLP-1 receptor agonists (Ozempic, Mounjaro, Wegovy, trulicity, Byetta, Victoza)                                         (  ) SGLT2 Inhibitors (Farxiga, Jardiance, Invokana)                                         (  ) Other                Bleeding Risk: 1.1%    Pre-cath Hydration: (  )  cc IV bolus x 1 over 1 hr followed by:  (  ) NS @ 75cc/hr until procedure (up to 2 hrs) if EF> 50%                                                                                                                         (  ) NS @ 50cc/hr until procedure (up to 2 hrs) if EF< 50%                                      (  ) No precath hydration d/t    RIGHT RADIAL ARTERY EVALUATION:  NADIR TEST: [] Negative          [] Positive    EF:   Date:    EKG:   Date: Patient is a 84y Male PMH of HTN, BPH and currently being treated for CLL (on Calquence). A NST was performed for routine screening, patient denies CP, palpitations, syncope SKY. NST showed moderate partially reversible apex and inferior apical defect. Patient presents today for [facility] with possible intervention.     Pre cath note:  indication:  [ ] STEMI                [ ] NSTEMI                 [ ] Acute coronary syndrome                   [ ]Unstable Angina   [ ] high risk  [ ] intermediate risk  [ ] low risk                   [ ] Stable Angina     non-invasive testing:     NST     Date:                     result: [ ] high risk  [x ] intermediate risk  [ ] low risk    Anti- Anginal medications:                    [x ] not used d/t     marginal BP                [ ] used   ( ) BB     ( ) CCB      ( ) Nitrate   (  ) Ranexa          [ ] not used but strong indication not to use    Ejection Fraction                   [ ] <29            [ ] 30-39%   [ ] 40-49%     [x ]>50%    CHF          [ ] active (within last 14 days on meds   [ ] Chronic (on meds but no exacerbation)  NYHA Functional Class:  (  ) Class I (no limitations)  (  ) Class II (slight limitation)  (  ) Class III (marked limitation)  (  ) Class IV (symptoms at rest)    COPD                   [ ] mild (on chronic bronchodilators)  [ ] moderate (on chronic steroid therapy)      [ ] severe (indication for home O2 or PACO2 >50)    Other risk factors:                     [ ] Previous MI                     [ ] CVA/ stroke                    [ ] carotid stent/ CEA                    [ ] PVD/PAD- (arterial aneurysm, non-palpable pulses, tortuous vessel with inability to insert catheter, infra-renal dissection, renal or subclavian artery stenosis)                    [ ] previous CABG                    [ ] Renal Failure:  on HD  (  ) yes  (  ) no                    [ ] Diabetic  (  ) Type 1  (  ) Type 2                                         (  ) Insulin dependent  (  ) non-insulin dependent                                         (  ) Metformin  (  ) Januvia  (  ) Glimepiride  (  ) Glipizide  (  ) Glyburide  (  ) Actos                                         (  ) GLP-1 receptor agonists (Ozempic, Mounjaro, Wegovy, trulicity, Byetta, Victoza)                                         (  ) SGLT2 Inhibitors (Farxiga, Jardiance, Invokana)                                         (  ) Other                Bleeding Risk: 1.1%    Pre-cath Hydration: ( x )  cc IV bolus x 1 over 1 hr followed by:  (  ) NS @ 75cc/hr until procedure (up to 2 hrs) if EF> 50%                                                                                                                         ( x ) NS @ 50cc/hr until procedure (up to 2 hrs) if EF< 50%                                      (  ) No precath hydration d/t    RIGHT RADIAL ARTERY EVALUATION:  NADIR TEST: [] Negative          [x] Positive    EF: 50%  Date:    EKG: SR 67 bpm PVCs  Date: 12/4 Patient is a 84y Male PMH of HTN, BPH and currently being treated for CLL (on Calquence). A NST was performed for the fatigue, SKY,  patient denies CP, palpitations, syncope. NST showed moderate partially reversible apex and inferior apical defect. Patient presents today for [facility] with possible intervention.     Pre cath note:  indication:  [ ] STEMI                [ ] NSTEMI                 [ ] Acute coronary syndrome                   [ ]Unstable Angina   [ ] high risk  [ ] intermediate risk  [ ] low risk                   [ ] Stable Angina     non-invasive testing:     NST     Date:                     result: [ ] high risk  [x ] intermediate risk  [ ] low risk    Anti- Anginal medications:                    [x ] not used d/t     marginal BP                [ ] used   ( ) BB     ( ) CCB      ( ) Nitrate   (  ) Ranexa          [ ] not used but strong indication not to use    Ejection Fraction                   [ ] <29            [ ] 30-39%   [ ] 40-49%     [x ]>50%    CHF          [ ] active (within last 14 days on meds   [ ] Chronic (on meds but no exacerbation)  NYHA Functional Class:  (  ) Class I (no limitations)  (  ) Class II (slight limitation)  (  ) Class III (marked limitation)  (  ) Class IV (symptoms at rest)    COPD                   [ ] mild (on chronic bronchodilators)  [ ] moderate (on chronic steroid therapy)      [ ] severe (indication for home O2 or PACO2 >50)    Other risk factors:                     [ ] Previous MI                     [ ] CVA/ stroke                    [ ] carotid stent/ CEA                    [ ] PVD/PAD- (arterial aneurysm, non-palpable pulses, tortuous vessel with inability to insert catheter, infra-renal dissection, renal or subclavian artery stenosis)                    [ ] previous CABG                    [ ] Renal Failure:  on HD  (  ) yes  (  ) no                    [ ] Diabetic  (  ) Type 1  (  ) Type 2                                         (  ) Insulin dependent  (  ) non-insulin dependent                                         (  ) Metformin  (  ) Januvia  (  ) Glimepiride  (  ) Glipizide  (  ) Glyburide  (  ) Actos                                         (  ) GLP-1 receptor agonists (Ozempic, Mounjaro, Wegovy, trulicity, Byetta, Victoza)                                         (  ) SGLT2 Inhibitors (Farxiga, Jardiance, Invokana)                                         (  ) Other                Bleeding Risk: 1.1%    Pre-cath Hydration: ( x )  cc IV bolus x 1 over 1 hr followed by:  (  ) NS @ 75cc/hr until procedure (up to 2 hrs) if EF> 50%                                                                                                                         ( x ) NS @ 50cc/hr until procedure (up to 2 hrs) if EF< 50%                                      (  ) No precath hydration d/t    RIGHT RADIAL ARTERY EVALUATION:  NADIR TEST: [] Negative          [x] Positive    EF: 50%  Date:    EKG: SR 67 bpm PVCs  Date: 12/4

## 2024-12-16 ENCOUNTER — NON-APPOINTMENT (OUTPATIENT)
Age: 84
End: 2024-12-16

## 2024-12-16 ENCOUNTER — TRANSCRIPTION ENCOUNTER (OUTPATIENT)
Age: 84
End: 2024-12-16

## 2024-12-16 ENCOUNTER — OUTPATIENT (OUTPATIENT)
Dept: OUTPATIENT SERVICES | Facility: HOSPITAL | Age: 84
LOS: 1 days | Discharge: HOME | End: 2024-12-16
Payer: MEDICARE

## 2024-12-16 VITALS
SYSTOLIC BLOOD PRESSURE: 101 MMHG | OXYGEN SATURATION: 97 % | DIASTOLIC BLOOD PRESSURE: 60 MMHG | HEART RATE: 77 BPM | RESPIRATION RATE: 18 BRPM

## 2024-12-16 DIAGNOSIS — Z90.49 ACQUIRED ABSENCE OF OTHER SPECIFIED PARTS OF DIGESTIVE TRACT: Chronic | ICD-10-CM

## 2024-12-16 DIAGNOSIS — Z98.890 OTHER SPECIFIED POSTPROCEDURAL STATES: Chronic | ICD-10-CM

## 2024-12-16 DIAGNOSIS — I35.9 NONRHEUMATIC AORTIC VALVE DISORDER, UNSPECIFIED: ICD-10-CM

## 2024-12-16 PROBLEM — I10 ESSENTIAL (PRIMARY) HYPERTENSION: Chronic | Status: ACTIVE | Noted: 2024-12-13

## 2024-12-16 LAB
ANION GAP SERPL CALC-SCNC: 10 MMOL/L — SIGNIFICANT CHANGE UP (ref 7–14)
BUN SERPL-MCNC: 14 MG/DL — SIGNIFICANT CHANGE UP (ref 10–20)
CALCIUM SERPL-MCNC: 8.6 MG/DL — SIGNIFICANT CHANGE UP (ref 8.4–10.4)
CHLORIDE SERPL-SCNC: 104 MMOL/L — SIGNIFICANT CHANGE UP (ref 98–110)
CO2 SERPL-SCNC: 26 MMOL/L — SIGNIFICANT CHANGE UP (ref 17–32)
CREAT SERPL-MCNC: 1.1 MG/DL — SIGNIFICANT CHANGE UP (ref 0.7–1.5)
EGFR: 67 ML/MIN/1.73M2 — SIGNIFICANT CHANGE UP
GLUCOSE SERPL-MCNC: 110 MG/DL — HIGH (ref 70–99)
HCT VFR BLD CALC: 42 % — SIGNIFICANT CHANGE UP (ref 42–52)
HGB BLD-MCNC: 14.5 G/DL — SIGNIFICANT CHANGE UP (ref 14–18)
MCHC RBC-ENTMCNC: 34 PG — HIGH (ref 27–31)
MCHC RBC-ENTMCNC: 34.5 G/DL — SIGNIFICANT CHANGE UP (ref 32–37)
MCV RBC AUTO: 98.6 FL — HIGH (ref 80–94)
NRBC # BLD: 0 /100 WBCS — SIGNIFICANT CHANGE UP (ref 0–0)
PLATELET # BLD AUTO: 106 K/UL — LOW (ref 130–400)
PMV BLD: 10 FL — SIGNIFICANT CHANGE UP (ref 7.4–10.4)
POTASSIUM SERPL-MCNC: 4.1 MMOL/L — SIGNIFICANT CHANGE UP (ref 3.5–5)
POTASSIUM SERPL-SCNC: 4.1 MMOL/L — SIGNIFICANT CHANGE UP (ref 3.5–5)
RBC # BLD: 4.26 M/UL — LOW (ref 4.7–6.1)
RBC # FLD: 12.3 % — SIGNIFICANT CHANGE UP (ref 11.5–14.5)
SODIUM SERPL-SCNC: 140 MMOL/L — SIGNIFICANT CHANGE UP (ref 135–146)
WBC # BLD: 12.38 K/UL — HIGH (ref 4.8–10.8)
WBC # FLD AUTO: 12.38 K/UL — HIGH (ref 4.8–10.8)

## 2024-12-16 PROCEDURE — C1887: CPT

## 2024-12-16 PROCEDURE — 80048 BASIC METABOLIC PNL TOTAL CA: CPT

## 2024-12-16 PROCEDURE — C1894: CPT

## 2024-12-16 PROCEDURE — C1769: CPT

## 2024-12-16 PROCEDURE — 85027 COMPLETE CBC AUTOMATED: CPT

## 2024-12-16 PROCEDURE — 36415 COLL VENOUS BLD VENIPUNCTURE: CPT

## 2024-12-16 PROCEDURE — 93454 CORONARY ARTERY ANGIO S&I: CPT

## 2024-12-16 RX ORDER — SODIUM CHLORIDE 9 MG/ML
250 INJECTION, SOLUTION INTRAMUSCULAR; INTRAVENOUS; SUBCUTANEOUS ONCE
Refills: 0 | Status: DISCONTINUED | OUTPATIENT
Start: 2024-12-16 | End: 2024-12-16

## 2024-12-16 RX ORDER — SODIUM CHLORIDE 9 MG/ML
1000 INJECTION, SOLUTION INTRAMUSCULAR; INTRAVENOUS; SUBCUTANEOUS
Refills: 0 | Status: DISCONTINUED | OUTPATIENT
Start: 2024-12-16 | End: 2024-12-16

## 2024-12-16 RX ORDER — CHLORHEXIDINE GLUCONATE 1.2 MG/ML
1 RINSE ORAL ONCE
Refills: 0 | Status: DISCONTINUED | OUTPATIENT
Start: 2024-12-16 | End: 2024-12-16

## 2024-12-16 RX ADMIN — SODIUM CHLORIDE 50 MILLILITER(S): 9 INJECTION, SOLUTION INTRAMUSCULAR; INTRAVENOUS; SUBCUTANEOUS at 08:50

## 2024-12-16 NOTE — ASU DISCHARGE PLAN (ADULT/PEDIATRIC) - CARE PROVIDER_API CALL
Shelby Soliman  Cardiovascular Disease  [address] [place] [id number]  Phone: [phone]  Fax: [phone]  Follow Up Time: 2 weeks   Shelyb Soliman  Cardiovascular Disease  [address] [facility], [address]  [place] [address]  Phone: [phone]  Fax: [phone]  Follow Up Time: 2 weeks    Yvan Carballo  Thoracic and Cardiac Surgery  [address] [place] [address], [address]  [place] [address]  Phone: [phone]  Fax: [phone]  Scheduled Appointment: 12/18/2024 02:30 PM

## 2024-12-16 NOTE — ASU PATIENT PROFILE, ADULT - FALL HARM RISK - UNIVERSAL INTERVENTIONS
Bed in lowest position, wheels locked, appropriate side rails in place/Call bell, personal items and telephone in reach/Instruct patient to call for assistance before getting out of bed or chair/Non-slip footwear when patient is out of bed/[place] to call system/Physically safe environment - no spills, clutter or unnecessary equipment/Purposeful Proactive Rounding/Room/bathroom lighting operational, light cord in reach

## 2024-12-16 NOTE — CHART NOTE - NSCHARTNOTEFT_GEN_A_CORE
PROCEDURE:   - Left heart cath     PHYSICIAN: Dr. Soliman  FELLOW: Dr. Ayala    Pre-procedure Diagnosis: Abnormal stress test    Consent: Patient  Anesthesia: Sedation | Local     ACCESS & CLOSURE:  6 Fr R Radial Artery -> D-stat    IV Contrast: 50 mL      Intervention:     Implants:     AUC: 7     FINDINGS:     Coronary Dominance: Right    LM: Mild disease.     LAD: Moderately calcified overall vessel. 80% heavily calcified lesion in mid segment at bifurcation with D1 (Mcgovern 1.1.0)  D1: mild overall disease    CX: Moderately ectatic vessel. 50% ostial disease. Mild overall disease   OM1:    RCA: Moderately calcified. Moderate atherosclerosis.   RPDA: 70% ostial disease      LVEDP: AV not crossed    AV gradient: No significant gradient     ESTIMATED BLOOD LOSS: < 10 mL      CONDITION: Good   SPECIMEN REMOVED: N/A     POST-OP DIAGNOSIS:    - 2 Vessel Coronary Artery Disease      PLAN OF CARE:   [X] D/C Home Today   [X] CT Surgery Consult   [X] Medications:   - Continue same meds  [X] LVEDP guided IV Fluids: NS @ 150cc/h x 2 hours  [X] Remove D-stat. Hold manual pressure if signs of hematoma or bleeding over radial access site. PROCEDURE:   - Left heart cath     PHYSICIAN: Dr. Soliman  FELLOW: Dr. Ayala    Pre-procedure Diagnosis: Abnormal stress test    Consent: Patient  Anesthesia: Sedation | Local     ACCESS & CLOSURE:  6 Fr R Radial Artery -> D-stat    IV Contrast: 50 mL      Intervention:     Implants:     AUC: 7     FINDINGS:     Coronary Dominance: Right    LM: Mild disease.     LAD: Moderately calcified overall vessel. 80% heavily calcified lesion in mid segment at bifurcation with D1 (Mcgovern 1.1.0)  D1: mild overall disease    CX: Moderately ectatic vessel. 50% ostial disease. Mild overall disease   OM1: Mild disease.     RCA: Moderately calcified. Moderate atherosclerosis.   RPDA: 70% ostial disease      LVEDP: AV not crossed    AV gradient: No significant gradient     ESTIMATED BLOOD LOSS: < 10 mL      CONDITION: Good   SPECIMEN REMOVED: N/A     POST-OP DIAGNOSIS:    - 2 Vessel Coronary Artery Disease      PLAN OF CARE:   [X] D/C Home Today   [X] CT Surgery Consult   [X] Medications:   - Continue same meds  [X] LVEDP guided IV Fluids: NS @ 150cc/h x 2 hours  [X] Remove D-stat. Hold manual pressure if signs of hematoma or bleeding over radial access site. PROCEDURE:   - Left heart cath     PHYSICIAN: Dr. Soliman  FELLOW: Dr. Ayala    Pre-procedure Diagnosis: Abnormal stress test    Consent: Patient  Anesthesia: Sedation | Local     ACCESS & CLOSURE:  6 Fr R Radial Artery -> D-stat    IV Contrast: 50 mL      Intervention:     Implants:     AUC: 7     FINDINGS:     Coronary Dominance: Right    LM: Distal 30% disease    LAD: Moderately calcified overall vessel. 80% heavily calcified lesion in mid segment at bifurcation with D1 (Mcgovern 1.1.0)  D1: mild overall disease    CX: Moderately ectatic vessel. 50% ostial disease. Mild overall disease   OM1: Mild disease.     RCA: Moderately calcified. Moderate atherosclerosis.   RPDA: 70% ostial disease      LVEDP: AV not crossed    AV gradient: No significant gradient     ESTIMATED BLOOD LOSS: < 10 mL      CONDITION: Good   SPECIMEN REMOVED: N/A     POST-OP DIAGNOSIS:    - 2 Vessel Coronary Artery Disease      PLAN OF CARE:   [X] D/C Home Today   [X] CT Surgery Consult   [X] Medications:   - Continue same meds  [X] LVEDP guided IV Fluids: NS @ 150cc/h x 2 hours  [X] Remove D-stat. Hold manual pressure if signs of hematoma or bleeding over radial access site. PROCEDURE:   - Left heart cath     PHYSICIAN: Dr. Soliman  FELLOW: Dr. Ayala    Pre-procedure Diagnosis: chest pain, SOB, Abnormal stress test for the LAD stenosis    Consent: Patient  Anesthesia: Sedation | Local     ACCESS & CLOSURE:  6 Fr R Radial Artery -> D-stat    IV Contrast: 50 mL      Intervention:     Implants:     AUC: 7     FINDINGS:     Coronary Dominance: Right    LM: Distal 30% disease    LAD: Moderately calcified overall vessel. 80% heavily calcified lesion in mid segment at bifurcation with D1 (Mcgovern 1.1.0)  D1: mild overall disease    CX: Moderately ectatic vessel. 50% ostial disease. Mild overall disease   OM1: Mild disease.     RCA: Moderately calcified. Moderate atherosclerosis.   RPDA: 70% ostial disease      LVEDP: AV not crossed    AV gradient: No significant gradient     ESTIMATED BLOOD LOSS: < 10 mL      CONDITION: Good   SPECIMEN REMOVED: N/A     POST-OP DIAGNOSIS:    - 2 Vessel Coronary Artery Disease      PLAN OF CARE:   [X] D/C Home Today   [X] CT Surgery Consult   [X] Medications:   - Continue same meds  [X] LVEDP guided IV Fluids: NS @ 150cc/h x 2 hours  [X] Remove D-stat. Hold manual pressure if signs of hematoma or bleeding over radial access site.  Appointment for the CT surgery consult was done for this Wednesday to see CT surgery Dr Carballo to discuss about minimally invasive LIMA to LAD  double the Lipitor mto 20 mg daily  can not give betablocker, his BP is 99/62 now and in the office also was low

## 2024-12-16 NOTE — ASU DISCHARGE PLAN (ADULT/PEDIATRIC) - PROVIDER TOKENS
PROVIDER:[TOKEN:[[id number]],FOLLOWUP:[2 weeks]] PROVIDER:[TOKEN:[[id number]],FOLLOWUP:[2 weeks]],PROVIDER:[TOKEN:[[id number]],SCHEDULEDAPPT:[12/18/2024],SCHEDULEDAPPTTIME:[02:30 PM]]

## 2024-12-16 NOTE — ASU DISCHARGE PLAN (ADULT/PEDIATRIC) - ASU DC SPECIAL INSTRUCTIONSFT
Discharge Instructions as follows:  - Continue medical regimen as prescribed to prevent chest pain.  - Continue baby ASA, Statin   - Instructed to call 911 if chest pain, shortness of breath or bleeding from access site.  - No heavy lifting > 10lbs x 1 week.  - No driving x 24 hours.  - No baths, swimming pools x 1 week, may shower  - Low sodium low fat low cholesterol diet  - Follow-up with Cardiologist in 1-2 weeks after discharge  - Soreness or tenderness at the site is possible, it will diminish over time. You may take Tylenol every 4-6 hours as needed. Nothing stronger is needed. NO Motrin/Ibuprofen  - Any questions call cardiac cath lab [phone] Discharge Instructions as follows:  - Continue medical regimen as prescribed to prevent chest pain.  - Increase Statin to 20mg  - Instructed to call 911 if chest pain, shortness of breath or bleeding from access site.  - No heavy lifting > 10lbs x 1 week.  - No driving x 24 hours.  - No baths, swimming pools x 1 week, may shower  - Low sodium low fat low cholesterol diet  - Follow-up with Cardiologist in 1-2 weeks after discharge  - Soreness or tenderness at the site is possible, it will diminish over time. You may take Tylenol every 4-6 hours as needed. Nothing stronger is needed. NO Motrin/Ibuprofen  - Any questions call cardiac cath lab [phone]

## 2024-12-16 NOTE — ASU DISCHARGE PLAN (ADULT/PEDIATRIC) - ACCOMPANIED BY
"    Chief Complaint  Elevated Blood Pressure (Fasting-needs blood redraw)    Subjective    History of Present Illness {CC  Problem List  Visit  Diagnosis   Encounters  Notes  Medications  Labs  Result Review Imaging  Media :23}     Swathi Tucker presents to [facility] for   History of Present Illness     -140's/70's at home  Still some swelling, states out of lasix the last 2-3 days . Did not wear compression stocking yesterday.  Has been keeping elevated.    Doing well staying away from salt.     Social History     Socioeconomic History   • Marital status:    Tobacco Use   • Smoking status: Never   • Smokeless tobacco: Never   Vaping Use   • Vaping Use: Never used   Substance and Sexual Activity   • Alcohol use: No   • Drug use: No   • Sexual activity: Defer     Partners: Male      Objective     Vital Signs:   /76 (BP Location: Left arm, Patient Position: Sitting, Cuff Size: Large Adult)   Pulse 64   Temp 97.3 °F (36.3 °C)   Ht 144.8 cm (57.01\")   Wt 80.5 kg (177 lb 6.4 oz)   SpO2 100%   BMI 38.38 kg/m²   Physical Exam  Constitutional:       General: She is not in acute distress.     Appearance: She is not ill-appearing.   HENT:      Head: Normocephalic.   Cardiovascular:      Rate and Rhythm: Regular rhythm.      Heart sounds: Normal heart sounds.   Pulmonary:      Breath sounds: Normal breath sounds.   Musculoskeletal:      Right lower leg: Edema present.      Left lower leg: Edema present.   Neurological:      Mental Status: She is alert.   Psychiatric:         Mood and Affect: Mood normal.        Result Review  Data Reviewed:{ Labs  Result Review  Imaging  Med Tab  Media :23}   The following data was reviewed by: Tiffanie Escobedo MD on 04/20/2023  Lab Results - Last 18 Months   Lab Units 04/13/23  1527 01/05/23  1023 09/27/22  0926 09/07/22  1015 06/23/22  1037 12/21/21  1013   BUN  CANCELED 19  --  15 15 16   CREATININE  CANCELED 0.87  -- "  0.83 0.92 0.89   EGFR IF NONAFRICN AM mL/min/1.73  --   --   --   --   --  63   EGFR IF AFRICN AM mL/min/1.73  --   --   --   --   --  72   SODIUM  CANCELED 142  --  139 142 139   POTASSIUM  CANCELED 4.5  --  4.3 4.4 4.0   CHLORIDE  CANCELED 101  --  100 101 101   CALCIUM  CANCELED 10.2  --  10.2 10.0 10.3   ALBUMIN  CANCELED 4.6  --  4.60 4.3 4.7   BILIRUBIN  CANCELED 0.3  --  0.2 0.3 0.3   ALK PHOS  CANCELED 77  --  78 79 80   AST (SGOT)  CANCELED 18  --  19 16 18   ALT (SGPT)  CANCELED 16  --  16 17 19   CHOLESTEROL mg/dL CANCELED  --   --  173 165 168   TRIGLYCERIDES  CANCELED  --   --  83 67 90   HDL CHOL  CANCELED  --   --  68* 56 58   VLDL CHOLESTEROL KATHY mg/dL CANCELED  --   --  15 13 17   LDL CHOL mg/dL  --   --   --  90 96 93   HEMOGLOBIN A1C % CANCELED  --  6.60*  --  6.3* 6.5*   MICROALB UR ug/mL 11.5  --   --   --   --  <3.0   WBC x10E3/uL  --   --   --   --  6.4 6.6   RBC x10E6/uL  --   --   --   --  4.39 4.87   HEMATOCRIT %  --   --   --   --  39.8 44.1   MCV fL  --   --   --   --  91 91   MCH pg  --   --   --   --  30.1 29.0              Current Outpatient Medications:   •  Accu-Chek FastClix Lancets misc, Use to test blood sugar two times a day., Disp: 102 each, Rfl: 1  •  albuterol sulfate  (90 Base) MCG/ACT inhaler, Inhale 2 puffs Every 4 (Four) Hours As Needed for Wheezing or Shortness of Air., Disp: 18 g, Rfl: 0  •  ascorbic acid (VITAMIN C) 500 MG tablet, Take 1 tablet by mouth Daily., Disp: , Rfl:   •  budesonide-formoterol (Symbicort) 160-4.5 MCG/ACT inhaler, Inhale 2 puffs 2 (Two) Times a Day., Disp: 10.2 g, Rfl: 10  •  Cholecalciferol (VITAMIN D) 2000 UNITS capsule, Take 1 capsule by mouth Daily., Disp: , Rfl:   •  CINNAMON PO, Take 1,000 mg by mouth Daily., Disp: , Rfl:   •  furosemide (Lasix) 20 MG tablet, Take 1 tablet by mouth Daily., Disp: 30 tablet, Rfl: 0  •  glucose blood (Accu-Chek Guide) test strip, 1 each by Other route 2 (Two) Times a Day. Use as instructed, Disp: 100  each, Rfl: 11  •  glucose monitor monitoring kit, Test blood sugar daily, Disp: 1 each, Rfl: 11  •  indapamide (LOZOL) 2.5 MG tablet, TAKE ONE TABLET BY MOUTH DAILY, Disp: 30 tablet, Rfl: 2  •  lisinopril (PRINIVIL,ZESTRIL) 40 MG tablet, Take 1 tablet by mouth Daily., Disp: 90 tablet, Rfl: 3  •  metFORMIN ER (GLUCOPHAGE-XR) 500 MG 24 hr tablet, TAKE ONE TABLET BY MOUTH DAILY WITH BREAKFAST, Disp: 30 tablet, Rfl: 3  •  metoprolol tartrate (LOPRESSOR) 50 MG tablet, TAKE ONE TABLET BY MOUTH DAILY, Disp: 90 tablet, Rfl: 0  •  MULTIPLE VITAMINS PO, Take 1 tablet by mouth Daily., Disp: , Rfl:   •  Omega-3 Fatty Acids (FISH OIL) 1000 MG capsule capsule, Take 1 capsule by mouth Daily., Disp: , Rfl:   •  pravastatin (PRAVACHOL) 10 MG tablet, TAKE ONE TABLET BY MOUTH ONCE NIGHTLY, Disp: 90 tablet, Rfl: 0      Assessment and Plan {CC Problem List  Visit Diagnosis  ROS  Review (Popup)  Health Maintenance  Quality  BestPractice  Medications  SmartSets  SnapShot Encounters  Media :23}   Problem List Items Addressed This Visit        Cardiac and Vasculature    Hyperlipidemia    Relevant Orders    Comprehensive Metabolic Panel    Lipid Panel    Hemoglobin A1c    Essential hypertension    Current Assessment & Plan     Hypertension is chronic, pt states it is stable with home reading. .  Continue current treatment regimen.  Dietary sodium restriction.  Regular aerobic exercise.  Continue current medications.  Ambulatory blood pressure monitoring.  bring BP log with her next visit.   Blood pressure will be reassessed in 4 weeks.         Relevant Orders    Comprehensive Metabolic Panel    Lipid Panel    Hemoglobin A1c       Endocrine and Metabolic    Diabetes type 2, controlled - Primary    Relevant Orders    Comprehensive Metabolic Panel    Lipid Panel    Hemoglobin A1c       Unable to get Blood last time.  Obtained today   Advised to take furosemide daily until next visit due to LE swelling, wear compression stockings.          Follow Up   Return in about 4 weeks (around 5/18/2023) for Recheck office visit check on BP and weight, bring BP log to next visit.  Patient was given instructions and counseling regarding her condition or for health maintenance advice. Please see specific information pulled into the AVS if appropriate.    EpicAct:[email or web address]_ORDERS,RunParams:STARTUPTYPE=FOLLOW    MR_[email or web address]_DISCHARGE   Family

## 2024-12-16 NOTE — ASU DISCHARGE PLAN (ADULT/PEDIATRIC) - FINANCIAL ASSISTANCE
[facility] provides services at a reduced cost to those who are determined to be eligible through [facility]’s financial assistance program. Information regarding [facility]’s financial assistance program can be found by going to [email or web address] or by calling [phone].

## 2024-12-17 DIAGNOSIS — I25.110 ATHEROSCLEROTIC HEART DISEASE OF NATIVE CORONARY ARTERY WITH UNSTABLE ANGINA PECTORIS: ICD-10-CM

## 2024-12-17 DIAGNOSIS — E78.5 HYPERLIPIDEMIA, UNSPECIFIED: ICD-10-CM

## 2024-12-17 DIAGNOSIS — I10 ESSENTIAL (PRIMARY) HYPERTENSION: ICD-10-CM

## 2024-12-17 DIAGNOSIS — R94.39 ABNORMAL RESULT OF OTHER CARDIOVASCULAR FUNCTION STUDY: ICD-10-CM

## 2024-12-18 ENCOUNTER — APPOINTMENT (OUTPATIENT)
Dept: CARDIOTHORACIC SURGERY | Facility: CLINIC | Age: 84
End: 2024-12-18
Payer: MEDICARE

## 2024-12-18 VITALS — SYSTOLIC BLOOD PRESSURE: 126 MMHG | DIASTOLIC BLOOD PRESSURE: 83 MMHG

## 2024-12-18 VITALS — DIASTOLIC BLOOD PRESSURE: 77 MMHG | SYSTOLIC BLOOD PRESSURE: 129 MMHG

## 2024-12-18 VITALS
BODY MASS INDEX: 30.31 KG/M2 | WEIGHT: 200 LBS | RESPIRATION RATE: 14 BRPM | HEART RATE: 103 BPM | HEIGHT: 68 IN | OXYGEN SATURATION: 95 %

## 2024-12-18 PROCEDURE — 99205 OFFICE O/P NEW HI 60 MIN: CPT

## 2024-12-18 RX ORDER — DUTASTERIDE 0.5 MG/1
0.5 CAPSULE, LIQUID FILLED ORAL
Refills: 0 | Status: ACTIVE | COMMUNITY

## 2024-12-20 VITALS
RESPIRATION RATE: 18 BRPM | DIASTOLIC BLOOD PRESSURE: 60 MMHG | HEART RATE: 55 BPM | OXYGEN SATURATION: 99 % | SYSTOLIC BLOOD PRESSURE: 111 MMHG

## 2024-12-20 NOTE — H&P CARDIOLOGY - NSICDXPASTMEDICALHX_GEN_ALL_CORE_FT
PAST MEDICAL HISTORY:  CAD S/P percutaneous coronary angioplasty     History of BPH     HTN (hypertension)

## 2024-12-20 NOTE — H&P CARDIOLOGY - HISTORY OF PRESENT ILLNESS
Patient is a 84y Male PMH of HTN, BPH and currently being treated for CLL (on Calquence). A NST was performed for the fatigue, SKY,  patient denies CP, palpitations, syncope. NST showed moderate partially reversible apex and inferior apical defect. Patient had Cath 12/16/24 that showed 2VD (LAD, RPDA) and CTS was consulted for minimally invasive LIMA-LAD due to patient is on Calquence for CLL but patient is high risk for bleeding due to risk of DAPT and Calquence being taken together. Patient presents to cardiology dept for staged PCI     Pt reports     Pre cath note:  indication:  [ ] STEMI                [ ] NSTEMI                 [ ] Acute coronary syndrome                   [ ]Unstable Angina   [ ] high risk  [ ] intermediate risk  [ ] low risk                   [x ] Stable Angina     non-invasive testing:    nst                     Date:                     result: [ ] high risk  [ ] intermediate risk  [ ] low risk    Anti- Anginal medications:                    [ ] not used d/t                     [ ] used   ( ) BB     ( ) CCB      ( ) Nitrate   (  ) Ranexa          [ ] not used but strong indication not to use    Ejection Fraction                   [ ] <29            [ ] 30-39%   [ ] 40-49%     [x ]>50%    CHF          [ ] active (within last 14 days on meds   [ ] Chronic (on meds but no exacerbation)  NYHA Functional Class:  (  ) Class I (no limitations)  (  ) Class II (slight limitation)  (  ) Class III (marked limitation)  (  ) Class IV (symptoms at rest)    COPD                   [ ] mild (on chronic bronchodilators)  [ ] moderate (on chronic steroid therapy)      [ ] severe (indication for home O2 or PACO2 >50)    Other risk factors:                     [ ] Previous MI                     [ ] CVA/ stroke                    [ ] carotid stent/ CEA                    [ ] PVD/PAD- (arterial aneurysm, non-palpable pulses, tortuous vessel with inability to insert catheter, infra-renal dissection, renal or subclavian artery stenosis)                    [ ] previous CABG                    [ ] Renal Failure:  on HD  (  ) yes  (  ) no                    [ ] Diabetic  (  ) Type 1  (  ) Type 2                                         (  ) Insulin dependent  (  ) non-insulin dependent                                         (  ) Metformin  (  ) Januvia  (  ) Glimepiride  (  ) Glipizide  (  ) Glyburide  (  ) Actos                                         (  ) GLP-1 receptor agonists (Ozempic, Mounjaro, Wegovy, Zepbound, Trulicity, Byetta, Victoza)                                         (  ) SGLT2 Inhibitors (Farxiga, Jardiance, Invokana)                                         (  ) Other                Bleeding Risk: 1.1%    Pre-cath Hydration: (  )  cc IV bolus x 1 over 1 hr followed by:    (  ) NS @ 75cc/hr until procedure (up to 2 hrs) if EF> 50%                                                                                                                             (  ) NS @ 50cc/hr until procedure (up to 2 hrs) if EF< 50%                                        (  ) No precath hydration d/t      RIGHT RADIAL ARTERY EVALUATION:  NADIR TEST: [] Negative          [] Positive    EF:   Date:50%    EKG:   Date:12/4/24 SR Patient is a 84y Male PMH of HTN, BPH and currently being treated for CLL (on Calquence). A NST was performed for the fatigue, SKY,  patient denies CP, palpitations, syncope. NST showed moderate partially reversible apex and inferior apical defect. Patient had Cath 12/16/24 that showed 2VD (LAD, RPDA) and CTS was consulted for minimally invasive LIMA-LAD due to patient is on Calquence for CLL but patient is high risk for bleeding due to risk of DAPT and Calquence being taken together. Patient presents to cardiology dept for staged PCI         Pre cath note:  indication:  [ ] STEMI                [ ] NSTEMI                 [ ] Acute coronary syndrome                   [ ]Unstable Angina   [ ] high risk  [ ] intermediate risk  [ ] low risk                   [x ] Stable Angina     non-invasive testing:    nst                     Date:                     result: [ ] high risk  [ ] intermediate risk  [ ] low risk    Anti- Anginal medications:                    [ x] not used d/t soft BP/susan HR                     [ ] used   ( ) BB     ( ) CCB      ( ) Nitrate   (  ) Ranexa          [ ] not used but strong indication not to use    Ejection Fraction                   [ ] <29            [ ] 30-39%   [ ] 40-49%     [x ]>50%    CHF          [ ] active (within last 14 days on meds   [ ] Chronic (on meds but no exacerbation)  NYHA Functional Class:  (  ) Class I (no limitations)  (  ) Class II (slight limitation)  (  ) Class III (marked limitation)  (  ) Class IV (symptoms at rest)    COPD                   [ ] mild (on chronic bronchodilators)  [ ] moderate (on chronic steroid therapy)      [ ] severe (indication for home O2 or PACO2 >50)    Other risk factors:                     [ ] Previous MI                     [ ] CVA/ stroke                    [ ] carotid stent/ CEA                    [ ] PVD/PAD- (arterial aneurysm, non-palpable pulses, tortuous vessel with inability to insert catheter, infra-renal dissection, renal or subclavian artery stenosis)                    [ ] previous CABG                    [ ] Renal Failure:  on HD  (  ) yes  (  ) no                    [ ] Diabetic  (  ) Type 1  (  ) Type 2                                         (  ) Insulin dependent  (  ) non-insulin dependent                                         (  ) Metformin  (  ) Januvia  (  ) Glimepiride  (  ) Glipizide  (  ) Glyburide  (  ) Actos                                         (  ) GLP-1 receptor agonists (Ozempic, Mounjaro, Wegovy, Zepbound, Trulicity, Byetta, Victoza)                                         (  ) SGLT2 Inhibitors (Farxiga, Jardiance, Invokana)                                         (  ) Other        Bleeding Risk: 1.1%    Pre-cath Hydration: (x )  cc IV bolus x 1 over 1 hr followed by:    ( x ) NS @ 75cc/hr until procedure (up to 2 hrs) if EF> 50%                                                                                                                             (  ) NS @ 50cc/hr until procedure (up to 2 hrs) if EF< 50%                                        (  ) No precath hydration d/t      RIGHT RADIAL ARTERY EVALUATION:  NADIR TEST: [] Negative          [] Positive- patient will be groin access for procedure    EF:   Date:50%    EKG:   Date:12/4/24 SR

## 2024-12-23 ENCOUNTER — OUTPATIENT (OUTPATIENT)
Dept: OUTPATIENT SERVICES | Facility: HOSPITAL | Age: 84
LOS: 1 days | Discharge: HOME | End: 2024-12-23
Payer: MEDICARE

## 2024-12-23 ENCOUNTER — TRANSCRIPTION ENCOUNTER (OUTPATIENT)
Age: 84
End: 2024-12-23

## 2024-12-23 VITALS
RESPIRATION RATE: 14 BRPM | DIASTOLIC BLOOD PRESSURE: 49 MMHG | HEART RATE: 72 BPM | OXYGEN SATURATION: 98 % | SYSTOLIC BLOOD PRESSURE: 107 MMHG

## 2024-12-23 DIAGNOSIS — I25.10 ATHEROSCLEROTIC HEART DISEASE OF NATIVE CORONARY ARTERY WITHOUT ANGINA PECTORIS: ICD-10-CM

## 2024-12-23 DIAGNOSIS — I10 ESSENTIAL (PRIMARY) HYPERTENSION: ICD-10-CM

## 2024-12-23 DIAGNOSIS — Z98.890 OTHER SPECIFIED POSTPROCEDURAL STATES: Chronic | ICD-10-CM

## 2024-12-23 DIAGNOSIS — Z90.49 ACQUIRED ABSENCE OF OTHER SPECIFIED PARTS OF DIGESTIVE TRACT: Chronic | ICD-10-CM

## 2024-12-23 DIAGNOSIS — E78.5 HYPERLIPIDEMIA, UNSPECIFIED: ICD-10-CM

## 2024-12-23 PROBLEM — Z87.438 PERSONAL HISTORY OF OTHER DISEASES OF MALE GENITAL ORGANS: Chronic | Status: ACTIVE | Noted: 2024-12-13

## 2024-12-23 LAB
ANION GAP SERPL CALC-SCNC: 12 MMOL/L — SIGNIFICANT CHANGE UP (ref 7–14)
ANION GAP SERPL CALC-SCNC: 9 MMOL/L — SIGNIFICANT CHANGE UP (ref 7–14)
BUN SERPL-MCNC: 21 MG/DL — HIGH (ref 10–20)
BUN SERPL-MCNC: 21 MG/DL — HIGH (ref 10–20)
CALCIUM SERPL-MCNC: 8 MG/DL — LOW (ref 8.4–10.4)
CALCIUM SERPL-MCNC: 8.6 MG/DL — SIGNIFICANT CHANGE UP (ref 8.4–10.4)
CHLORIDE SERPL-SCNC: 103 MMOL/L — SIGNIFICANT CHANGE UP (ref 98–110)
CHLORIDE SERPL-SCNC: 105 MMOL/L — SIGNIFICANT CHANGE UP (ref 98–110)
CO2 SERPL-SCNC: 25 MMOL/L — SIGNIFICANT CHANGE UP (ref 17–32)
CO2 SERPL-SCNC: 27 MMOL/L — SIGNIFICANT CHANGE UP (ref 17–32)
CREAT SERPL-MCNC: 0.9 MG/DL — SIGNIFICANT CHANGE UP (ref 0.7–1.5)
CREAT SERPL-MCNC: 1 MG/DL — SIGNIFICANT CHANGE UP (ref 0.7–1.5)
EGFR: 75 ML/MIN/1.73M2 — SIGNIFICANT CHANGE UP
EGFR: 85 ML/MIN/1.73M2 — SIGNIFICANT CHANGE UP
GLUCOSE SERPL-MCNC: 104 MG/DL — HIGH (ref 70–99)
GLUCOSE SERPL-MCNC: 104 MG/DL — HIGH (ref 70–99)
HCT VFR BLD CALC: 33.6 % — LOW (ref 42–52)
HCT VFR BLD CALC: 41.5 % — LOW (ref 42–52)
HGB BLD-MCNC: 11.9 G/DL — LOW (ref 14–18)
HGB BLD-MCNC: 14 G/DL — SIGNIFICANT CHANGE UP (ref 14–18)
MCHC RBC-ENTMCNC: 33.3 PG — HIGH (ref 27–31)
MCHC RBC-ENTMCNC: 33.7 G/DL — SIGNIFICANT CHANGE UP (ref 32–37)
MCHC RBC-ENTMCNC: 34.4 PG — HIGH (ref 27–31)
MCHC RBC-ENTMCNC: 35.4 G/DL — SIGNIFICANT CHANGE UP (ref 32–37)
MCV RBC AUTO: 97.1 FL — HIGH (ref 80–94)
MCV RBC AUTO: 98.8 FL — HIGH (ref 80–94)
NRBC # BLD: 0 /100 WBCS — SIGNIFICANT CHANGE UP (ref 0–0)
NRBC # BLD: 0 /100 WBCS — SIGNIFICANT CHANGE UP (ref 0–0)
PLATELET # BLD AUTO: 112 K/UL — LOW (ref 130–400)
PLATELET # BLD AUTO: 95 K/UL — LOW (ref 130–400)
PMV BLD: 10.1 FL — SIGNIFICANT CHANGE UP (ref 7.4–10.4)
PMV BLD: 10.3 FL — SIGNIFICANT CHANGE UP (ref 7.4–10.4)
POTASSIUM SERPL-MCNC: 3.9 MMOL/L — SIGNIFICANT CHANGE UP (ref 3.5–5)
POTASSIUM SERPL-MCNC: 4.2 MMOL/L — SIGNIFICANT CHANGE UP (ref 3.5–5)
POTASSIUM SERPL-SCNC: 3.9 MMOL/L — SIGNIFICANT CHANGE UP (ref 3.5–5)
POTASSIUM SERPL-SCNC: 4.2 MMOL/L — SIGNIFICANT CHANGE UP (ref 3.5–5)
RBC # BLD: 3.46 M/UL — LOW (ref 4.7–6.1)
RBC # BLD: 4.2 M/UL — LOW (ref 4.7–6.1)
RBC # FLD: 12.6 % — SIGNIFICANT CHANGE UP (ref 11.5–14.5)
RBC # FLD: 12.7 % — SIGNIFICANT CHANGE UP (ref 11.5–14.5)
SODIUM SERPL-SCNC: 139 MMOL/L — SIGNIFICANT CHANGE UP (ref 135–146)
SODIUM SERPL-SCNC: 142 MMOL/L — SIGNIFICANT CHANGE UP (ref 135–146)
WBC # BLD: 9.28 K/UL — SIGNIFICANT CHANGE UP (ref 4.8–10.8)
WBC # BLD: 9.83 K/UL — SIGNIFICANT CHANGE UP (ref 4.8–10.8)
WBC # FLD AUTO: 9.28 K/UL — SIGNIFICANT CHANGE UP (ref 4.8–10.8)
WBC # FLD AUTO: 9.83 K/UL — SIGNIFICANT CHANGE UP (ref 4.8–10.8)

## 2024-12-23 PROCEDURE — C1725: CPT

## 2024-12-23 PROCEDURE — C1894: CPT

## 2024-12-23 PROCEDURE — C1874: CPT

## 2024-12-23 PROCEDURE — 92978 ENDOLUMINL IVUS OCT C 1ST: CPT | Mod: LD

## 2024-12-23 PROCEDURE — C1887: CPT

## 2024-12-23 PROCEDURE — C9602: CPT | Mod: LD

## 2024-12-23 PROCEDURE — C1753: CPT

## 2024-12-23 PROCEDURE — C1724: CPT

## 2024-12-23 PROCEDURE — 92921: CPT | Mod: LD

## 2024-12-23 PROCEDURE — 93010 ELECTROCARDIOGRAM REPORT: CPT

## 2024-12-23 PROCEDURE — C1760: CPT

## 2024-12-23 PROCEDURE — 36415 COLL VENOUS BLD VENIPUNCTURE: CPT

## 2024-12-23 PROCEDURE — C1769: CPT

## 2024-12-23 PROCEDURE — 93005 ELECTROCARDIOGRAM TRACING: CPT

## 2024-12-23 PROCEDURE — 85027 COMPLETE CBC AUTOMATED: CPT

## 2024-12-23 PROCEDURE — 80048 BASIC METABOLIC PNL TOTAL CA: CPT | Mod: 59

## 2024-12-23 RX ORDER — DUTASTERIDE 0.5 MG/1
1 CAPSULE, LIQUID FILLED ORAL
Refills: 0 | DISCHARGE

## 2024-12-23 RX ORDER — CHLORHEXIDINE GLUCONATE 1.2 MG/ML
1 RINSE ORAL ONCE
Refills: 0 | Status: DISCONTINUED | OUTPATIENT
Start: 2024-12-23 | End: 2024-12-23

## 2024-12-23 RX ORDER — TAMSULOSIN HYDROCHLORIDE 0.4 MG/1
1 CAPSULE ORAL
Refills: 0 | DISCHARGE

## 2024-12-23 RX ORDER — SODIUM CHLORIDE 9 MG/ML
1000 INJECTION, SOLUTION INTRAMUSCULAR; INTRAVENOUS; SUBCUTANEOUS
Refills: 0 | Status: DISCONTINUED | OUTPATIENT
Start: 2024-12-23 | End: 2024-12-23

## 2024-12-23 RX ORDER — CLOPIDOGREL 75 MG/1
1 TABLET, FILM COATED ORAL
Qty: 30 | Refills: 3
Start: 2024-12-23 | End: 2025-04-21

## 2024-12-23 RX ORDER — SODIUM CHLORIDE 9 MG/ML
250 INJECTION, SOLUTION INTRAMUSCULAR; INTRAVENOUS; SUBCUTANEOUS ONCE
Refills: 0 | Status: COMPLETED | OUTPATIENT
Start: 2024-12-23 | End: 2024-12-23

## 2024-12-23 RX ORDER — CLOPIDOGREL 75 MG/1
600 TABLET, FILM COATED ORAL ONCE
Refills: 0 | Status: COMPLETED | OUTPATIENT
Start: 2024-12-23 | End: 2024-12-23

## 2024-12-23 RX ORDER — ACALABRUTINIB 100 MG/1
1 CAPSULE, GELATIN COATED ORAL
Refills: 0 | DISCHARGE

## 2024-12-23 RX ADMIN — SODIUM CHLORIDE 500 MILLILITER(S): 9 INJECTION, SOLUTION INTRAMUSCULAR; INTRAVENOUS; SUBCUTANEOUS at 09:11

## 2024-12-23 RX ADMIN — CLOPIDOGREL 600 MILLIGRAM(S): 75 TABLET, FILM COATED ORAL at 09:10

## 2024-12-23 NOTE — ASU PATIENT PROFILE, ADULT - FALL HARM RISK - UNIVERSAL INTERVENTIONS
Bed in lowest position, wheels locked, appropriate side rails in place/Call bell, personal items and telephone in reach/Instruct patient to call for assistance before getting out of bed or chair/Non-slip footwear when patient is out of bed/[place] to call system/Physically safe environment - no spills, clutter or unnecessary equipment/Purposeful Proactive Rounding/Room/bathroom lighting operational, light cord in reach

## 2024-12-23 NOTE — ASU DISCHARGE PLAN (ADULT/PEDIATRIC) - ASU DC SPECIAL INSTRUCTIONSFT
Activity:  - Do not drive or operate heavy machinery for 24 hours.  - Limit your physical or any strenuous activity for 2 weeks after angioplasty and 48 hours for angiogram. Support the groin site with your hand when you sneeze or cough. No heavy lifting ( objects more then 10 pounds).  - For wrist access, avoid using affected arm for 24 hours after removal of dressing and avoid heavy lifting for 7 days.  Hygiene:  - After 24 hours, you may shower and remove the dressing from the site. Do not tub bathe for one week. Do not rub or apply lotion, cream, powder to the affected site. Leave it open to air.   Diet:   - You may resume your diet. Low Sodium. Low Fat, Low Cholesterol.  If Diabetic - Carbohydrate Consistent Diet.      - Drink extra fluid unless otherwise advised.   Special Instructions:  - Bruising or black and blue at the puncture site is possible.  - If there is bleeding from the puncture site (groin or wrist) apply direct firm pressure on the site and call 911.  - Any sudden swelling, redness, fever, discharge or severe pain, call your physician or call the cath lab.   - If you notice any scab formation in the area avoid touching the site and allow it to heal.  - Numbness or "pins and needle" sensation in the affected arm, hand, leg or if the affected site become cool to touch or pale that persist for extended period of time call your physician immediately to be checked.  - If you developed chest pain, not relieved by your usual routine medication, fainting, lethargy, weakness, report to the nearest emergency room.   - Inform your Dentist or Surgeon if you are taking Aspirin or any antiplatelet medications. Report any bleeding in your urine or stool.   Medications:  - Soreness or tenderness at the site is possible it will diminish over time. You may take Tylenol every 4-6 hours as needed. Nothing stronger is needed.  - If you are diabetic and taking medication containing Metformin, do not take them for 48 hours after the procedure.     Any questions call Cardiac Cath Lab at [phone] or [phone], Monday - Friday from 7 - 9 pm.

## 2024-12-23 NOTE — ASU PATIENT PROFILE, ADULT - NSICDXPASTMEDICALHX_GEN_ALL_CORE_FT
PAST MEDICAL HISTORY:  CAD S/P percutaneous coronary angioplasty     History of BPH     HTN (hypertension)      PAST MEDICAL HISTORY:  CAD S/P percutaneous coronary angioplasty     CLL (chronic lymphocytic leukemia)     History of BPH     HTN (hypertension)

## 2024-12-23 NOTE — ASU DISCHARGE PLAN (ADULT/PEDIATRIC) - CARE PROVIDER_API CALL
Shelby Soliman  Cardiovascular Disease  [address] [place] [id number]  Phone: [phone]  Fax: [phone]  Follow Up Time: 2 weeks

## 2024-12-23 NOTE — PROGRESS NOTE ADULT - SUBJECTIVE AND OBJECTIVE BOX
Cardiology Follow up s/p PCI    ERNST KEVIN   84y Male  PAST MEDICAL & SURGICAL HISTORY:  HTN (hypertension)  History of BPH  CAD S/P percutaneous coronary angioplasty  CLL (chronic lymphocytic leukemia)  History of cholecystectomy    ory of prostate biopsy           HPI:  Patient is a 84y Male PMH of HTN, BPH and currently being treated for CLL (on Calquence). A NST was performed for the fatigue, SKY,  patient denies CP, palpitations, syncope. NST showed moderate partially reversible apex and inferior apical defect. Patient had Cath 12/16/24 that showed 2VD (LAD, RPDA) and CTS was consulted for minimally invasive LIMA-LAD due to patient is on Calquence for CLL but patient is high risk for bleeding due to risk of DAPT and Calquence being taken together. Patient presents to cardiology dept for staged PCI         Pre cath note:  indication:  [ ] STEMI                [ ] NSTEMI                 [ ] Acute coronary syndrome                   [ ]Unstable Angina   [ ] high risk  [ ] intermediate risk  [ ] low risk                   [x ] Stable Angina     non-invasive testing:    nst                     Date:                     result: [ ] high risk  [ ] intermediate risk  [ ] low risk    Anti- Anginal medications:                    [ x] not used d/t soft BP/susan HR                     [ ] used   ( ) BB     ( ) CCB      ( ) Nitrate   (  ) Ranexa          [ ] not used but strong indication not to use    Ejection Fraction                   [ ] <29            [ ] 30-39%   [ ] 40-49%     [x ]>50%    CHF          [ ] active (within last 14 days on meds   [ ] Chronic (on meds but no exacerbation)  NYHA Functional Class:  (  ) Class I (no limitations)  (  ) Class II (slight limitation)  (  ) Class III (marked limitation)  (  ) Class IV (symptoms at rest)    COPD                   [ ] mild (on chronic bronchodilators)  [ ] moderate (on chronic steroid therapy)      [ ] severe (indication for home O2 or PACO2 >50)    Other risk factors:                     [ ] Previous MI                     [ ] CVA/ stroke                    [ ] carotid stent/ CEA                    [ ] PVD/PAD- (arterial aneurysm, non-palpable pulses, tortuous vessel with inability to insert catheter, infra-renal dissection, renal or subclavian artery stenosis)                    [ ] previous CABG                    [ ] Renal Failure:  on HD  (  ) yes  (  ) no                    [ ] Diabetic  (  ) Type 1  (  ) Type 2                                         (  ) Insulin dependent  (  ) non-insulin dependent                                         (  ) Metformin  (  ) Januvia  (  ) Glimepiride  (  ) Glipizide  (  ) Glyburide  (  ) Actos                                         (  ) GLP-1 receptor agonists (Ozempic, Mounjaro, Wegovy, Zepbound, Trulicity, Byetta, Victoza)                                         (  ) SGLT2 Inhibitors (Farxiga, Jardiance, Invokana)                                         (  ) Other        Bleeding Risk: 1.1%    Pre-cath Hydration: (x )  cc IV bolus x 1 over 1 hr followed by:    ( x ) NS @ 75cc/hr until procedure (up to 2 hrs) if EF> 50%                                                                                                                             (  ) NS @ 50cc/hr until procedure (up to 2 hrs) if EF< 50%                                        (  ) No precath hydration d/t      RIGHT RADIAL ARTERY EVALUATION:  NADIR TEST: [] Negative          [] Positive- patient will be groin access for procedure    EF:   Date:50%    EKG:   Date:12/4/24 SR (20 Dec 2024 16:08)    Allergies    Cipro (Rash)    Intolerances      Patient seen and examined at bedside.   Patient without complaints.   Denies CP, SOB, palpitations, or dizziness  No events on telemetry     Vital Signs Last 24 Hrs  T(C): 36.6 (23 Dec 2024 09:00), Max: 36.6 (23 Dec 2024 09:00)  T(F): 97.8 (23 Dec 2024 09:00), Max: 97.8 (23 Dec 2024 09:00)  HR: 50   BP: 141/60   BP(mean): --  RR: 14   SpO2: 96%         MEDICATIONS  (STANDING):  chlorhexidine 4% Liquid 1 Application(s) Topical once  sodium chloride 0.9%. 1000 milliLiter(s) (75 mL/Hr) IV Continuous <Continuous>  sodium chloride 0.9%. 1000 milliLiter(s) (150 mL/Hr) IV Continuous <Continuous>    MEDICATIONS  (PRN):      REVIEW OF SYSTEMS:          All negative except as mentioned in HPI    PHYSICAL EXAM:           CONSTITUTIONAL: Well-developed; well-nourished; in no acute distress  	SKIN: warm, dry  	HEAD: Normocephalic; atraumatic  	EYES: PERRL.  	ENT: No nasal discharge, airway clear, mucous membranes moist  	NECK: Supple; non tender.  	CARD: +S1, +S2, no murmurs, gallops, or rubs. Regular rate and rhythm    	RESP: No wheezes, rales or rhonchi. CTA B/L  	ABD: soft ntnd, + BS x 4 quadrants  	EXT: moves all extremities,  no clubbing, cyanosis or edema  	NEURO: Alert and oriented x3, no focal deficits          PSYCH: Cooperative, appropriate          VASCULAR:  + Rad / + PTs / +  DPs          EXTREMITY:             Right Groin:  Dressing D/C/I, perclose, access site soft, no hematoma, no pain, + pulses, no sign of infection, no numbness  	               post PCI EKG:  post pCI BMP:  post pci CBC:                                                                                                                2D ECHO:    LABS:                        14.0   9.83  )-----------( 112      ( 23 Dec 2024 09:20 )             41.5     12-23    142  |  103  |  21[H]  ----------------------------<  104[H]  3.9   |  27  |  1.0    Ca    8.6      23 Dec 2024 09:20              A/P:  I discussed the case with Cardiologist Dr. Small  and recommend the following:    S/P PCI:                       CBC/ECG at 1500                   NS  _150 cc/hr x 6 _hr  	     Continue DAPT ( Aspirin 81 mg PO Daily and Plavix 75mg PO daily), Statin Therapy                  NO BB- due to bradycardia (HR 50s)                   NO ACE/ARB EF WNL, no DM                                   Patient given 30 day supply of ( Aspirin 81 mg daily and Plavix 75 mg daily ) to take at home                   Patient agreeing to take DAPT for at least one year or as directed by cardiologist                    Pt given instructions on importance of taking antiplatelet medication or risk acute stent thrombosis/death                   Post cath instructions, access site care and activity restrictions reviewed with patient                     Discussed with patient to return to hospital if experience chest pain, shortness breath, dizziness and site bleeding                   Aggressive risk factor modification, diet counseling, smoking cessation discussed with patient                       Can discharge patient from cardiac standpoint at 1700  after ambulating without symptoms and access site wnl, ECG and blood work reviewed   Cardiac rehab information provided/ referral and communication for cardiac rehab completed                   Benefits of Cardiac Rehab discussed with patient,  Patient instructed to call and make first                               appointment after first f/u visit with Cardiologist                    Follow up with Cardiology Dr. Soliman   in  two weeks.  Instructed to call and make an appointment                      Discharge instructions as follows, when ready to d/c:                   - Continue medical regimen as prescribed to prevent chest pain                   - Continue dual anti-platelet therapy, statin                   - If you are diabetic and taking medication containing Metformin, do not take them for 48 hours after the procedure                   - Instructed to call 911 if chest pain, shortness of breath or bleeding from access site                   - No heavy lifting >10lbs x 1 week                   - No driving x 24 hours                   - No baths, swimming pools x 1 week, may shower                   - Low sodium low fat low cholesterol diet                   - Follow-up with Cardiologist in 2 weeks after discharge                                        Cardiology Follow up s/p PCI    ERNST KEVIN   84y Male  PAST MEDICAL & SURGICAL HISTORY:  HTN (hypertension)  History of BPH  CAD S/P percutaneous coronary angioplasty  CLL (chronic lymphocytic leukemia)  History of cholecystectomy    ory of prostate biopsy           HPI:  Patient is a 84y Male PMH of HTN, BPH and currently being treated for CLL (on Calquence). A NST was performed for the fatigue, SKY,  patient denies CP, palpitations, syncope. NST showed moderate partially reversible apex and inferior apical defect. Patient had Cath 12/16/24 that showed 2VD (LAD, RPDA) and CTS was consulted for minimally invasive LIMA-LAD due to patient is on Calquence for CLL but patient is high risk for bleeding due to risk of DAPT and Calquence being taken together. Patient presents to cardiology dept for staged PCI         Pre cath note:  indication:  [ ] STEMI                [ ] NSTEMI                 [ ] Acute coronary syndrome                   [ ]Unstable Angina   [ ] high risk  [ ] intermediate risk  [ ] low risk                   [x ] Stable Angina     non-invasive testing:    nst                     Date:                     result: [ ] high risk  [ ] intermediate risk  [ ] low risk    Anti- Anginal medications:                    [ x] not used d/t soft BP/susan HR                     [ ] used   ( ) BB     ( ) CCB      ( ) Nitrate   (  ) Ranexa          [ ] not used but strong indication not to use    Ejection Fraction                   [ ] <29            [ ] 30-39%   [ ] 40-49%     [x ]>50%    CHF          [ ] active (within last 14 days on meds   [ ] Chronic (on meds but no exacerbation)  NYHA Functional Class:  (  ) Class I (no limitations)  (  ) Class II (slight limitation)  (  ) Class III (marked limitation)  (  ) Class IV (symptoms at rest)    COPD                   [ ] mild (on chronic bronchodilators)  [ ] moderate (on chronic steroid therapy)      [ ] severe (indication for home O2 or PACO2 >50)    Other risk factors:                     [ ] Previous MI                     [ ] CVA/ stroke                    [ ] carotid stent/ CEA                    [ ] PVD/PAD- (arterial aneurysm, non-palpable pulses, tortuous vessel with inability to insert catheter, infra-renal dissection, renal or subclavian artery stenosis)                    [ ] previous CABG                    [ ] Renal Failure:  on HD  (  ) yes  (  ) no                    [ ] Diabetic  (  ) Type 1  (  ) Type 2                                         (  ) Insulin dependent  (  ) non-insulin dependent                                         (  ) Metformin  (  ) Januvia  (  ) Glimepiride  (  ) Glipizide  (  ) Glyburide  (  ) Actos                                         (  ) GLP-1 receptor agonists (Ozempic, Mounjaro, Wegovy, Zepbound, Trulicity, Byetta, Victoza)                                         (  ) SGLT2 Inhibitors (Farxiga, Jardiance, Invokana)                                         (  ) Other        Bleeding Risk: 1.1%    Pre-cath Hydration: (x )  cc IV bolus x 1 over 1 hr followed by:    ( x ) NS @ 75cc/hr until procedure (up to 2 hrs) if EF> 50%                                                                                                                             (  ) NS @ 50cc/hr until procedure (up to 2 hrs) if EF< 50%                                        (  ) No precath hydration d/t      RIGHT RADIAL ARTERY EVALUATION:  NADIR TEST: [] Negative          [] Positive- patient will be groin access for procedure    EF:   Date:50%    EKG:   Date:12/4/24 SR (20 Dec 2024 16:08)    Allergies    Cipro (Rash)    Intolerances      Patient seen and examined at bedside.   Patient without complaints.   Denies CP, SOB, palpitations, or dizziness  No events on telemetry     Vital Signs Last 24 Hrs  T(C): 36.6 (23 Dec 2024 09:00), Max: 36.6 (23 Dec 2024 09:00)  T(F): 97.8 (23 Dec 2024 09:00), Max: 97.8 (23 Dec 2024 09:00)  HR: 50   BP: 141/60   BP(mean): --  RR: 14   SpO2: 96%         MEDICATIONS  (STANDING):  chlorhexidine 4% Liquid 1 Application(s) Topical once  sodium chloride 0.9%. 1000 milliLiter(s) (75 mL/Hr) IV Continuous <Continuous>  sodium chloride 0.9%. 1000 milliLiter(s) (150 mL/Hr) IV Continuous <Continuous>    MEDICATIONS  (PRN):      REVIEW OF SYSTEMS:          All negative except as mentioned in HPI    PHYSICAL EXAM:           CONSTITUTIONAL: Well-developed; well-nourished; in no acute distress  	SKIN: warm, dry  	HEAD: Normocephalic; atraumatic  	EYES: PERRL.  	ENT: No nasal discharge, airway clear, mucous membranes moist  	NECK: Supple; non tender.  	CARD: +S1, +S2, no murmurs, gallops, or rubs. Regular rate and rhythm    	RESP: No wheezes, rales or rhonchi. CTA B/L  	ABD: soft ntnd, + BS x 4 quadrants  	EXT: moves all extremities,  no clubbing, cyanosis or edema  	NEURO: Alert and oriented x3, no focal deficits          PSYCH: Cooperative, appropriate          VASCULAR:  + Rad / + PTs / +  DPs          EXTREMITY:             Right Groin:  Dressing D/C/I, perclose, access site soft, no hematoma, no pain, + pulses, no sign of infection, no numbness  	               post PCI EKG: SR  post pCI BMP: 0.9 (pre 1)  post pci CBC: 11.9 (pre 14)                                                                                                                2D ECHO:    LABS:                        14.0   9.83  )-----------( 112      ( 23 Dec 2024 09:20 )             41.5     12-23    142  |  103  |  21[H]  ----------------------------<  104[H]  3.9   |  27  |  1.0    Ca    8.6      23 Dec 2024 09:20              A/P:  I discussed the case with Cardiologist Dr. Small  and recommend the following:    S/P PCI:                       CBC/ECG at 1500                   NS  _150 cc/hr x 6 _hr  	     Continue DAPT ( Aspirin 81 mg PO Daily and Plavix 75mg PO daily), Statin Therapy                  NO BB- due to bradycardia (HR 50s)                   NO ACE/ARB EF WNL, no DM                                   Patient given 30 day supply of ( Aspirin 81 mg daily and Plavix 75 mg daily ) to take at home                   Patient agreeing to take DAPT for at least one year or as directed by cardiologist                    Pt given instructions on importance of taking antiplatelet medication or risk acute stent thrombosis/death                   Post cath instructions, access site care and activity restrictions reviewed with patient                     Discussed with patient to return to hospital if experience chest pain, shortness breath, dizziness and site bleeding                   Aggressive risk factor modification, diet counseling, smoking cessation discussed with patient                       Can discharge patient from cardiac standpoint at 1700  after ambulating without symptoms and access site wnl, ECG and blood work reviewed   Cardiac rehab information provided/ referral and communication for cardiac rehab completed                   Benefits of Cardiac Rehab discussed with patient,  Patient instructed to call and make first                               appointment after first f/u visit with Cardiologist                    Follow up with Cardiology Dr. Soliman   in  two weeks.  Instructed to call and make an appointment                      Discharge instructions as follows, when ready to d/c:                   - Continue medical regimen as prescribed to prevent chest pain                   - Continue dual anti-platelet therapy, statin                   - If you are diabetic and taking medication containing Metformin, do not take them for 48 hours after the procedure                   - Instructed to call 911 if chest pain, shortness of breath or bleeding from access site                   - No heavy lifting >10lbs x 1 week                   - No driving x 24 hours                   - No baths, swimming pools x 1 week, may shower                   - Low sodium low fat low cholesterol diet                   - Follow-up with Cardiologist in 2 weeks after discharge

## 2024-12-23 NOTE — ASU DISCHARGE PLAN (ADULT/PEDIATRIC) - FINANCIAL ASSISTANCE
[facility] provides services at a reduced cost to those who are determined to be eligible through [facility]’s financial assistance program. Information regarding [facility]’s financial assistance program can be found by going to [email or web address] or by calling [phone].

## 2024-12-23 NOTE — CHART NOTE - NSCHARTNOTEFT_GEN_A_CORE
PRE-OP DIAGNOSIS:  Staged Procedure to LAD      PROCEDURE:     [X] Coronary Angiogram     [X] LHC     [] LVG     [] RHC     [X] Intervention (see below)         PHYSICIAN:  Dr. Small    ASSISTANT:  Dr. Mobley       PROCEDURE DESCRIPTION:     Consent:      [X] Patient     [] Family Member     []  Used        Anesthesia:     [X] General     [] Sedation     [X] Local        Access & Closure:     [] Fr Radial Artery     [X] 7Fr R Femoral Artery (Perclose)    [] Fr Femoral Vein     [] Fr Brachial Vein       IV Contrast: 150 mL        Intervention:   POBA 1st diag  TAO PCI to Prox LAD  IVUS  Rotational arthrectomy    Implants:    SYNERGY 3.0 x 48mm to Prox LAD (AUC 7)    FINDINGS:     Coronary Dominance: Right    LM: Distal 30% disease    LAD: Moderately calcified overall vessel. 80% heavily calcified lesion in Prox to mid LAD s/p PCI  D1: mild overall disease    CX: Moderately ectatic vessel. 50% ostial disease. Mild overall disease   OM1: Mild disease.     RCA: Nott injected. Prior cath shows Moderately calcified. Moderate atherosclerosis.   RPDA: 70% ostial disease      LVEDP: 11 mmHg      ESTIMATED BLOOD LOSS: < 10 mL        CONDITION:     [X] Good     [] Fair     [] Critical        SPECIMEN REMOVED: N/A       POST-OP DIAGNOSIS:      [] Normal Coronary Angiogram     [] Mild Coronary Artery Disease (< 50% stenosis)     [X] 2 Vessel Coronary Artery Disease LAD, RCA/ S/P PCI LAD       PLAN OF CARE:     [X] D/C Home Today     [] Return to In-patient bed     [] Admit for observation     [] Return for Staged Procedure     [] CT Surgery Consult     [X] Medications: DAPT, statin and BB    [X] IV Fluids: NS 100cc/hr x 6hrs

## 2024-12-23 NOTE — ASU DISCHARGE PLAN (ADULT/PEDIATRIC) - NS MD DC FALL RISK RISK
For information on Fall & Injury Prevention, visit: [email or web address] OR  [email or web address] OR  https://www.cdc.gov/steadi/patient.html

## 2024-12-30 ENCOUNTER — LABORATORY RESULT (OUTPATIENT)
Age: 84
End: 2024-12-30

## 2024-12-30 ENCOUNTER — APPOINTMENT (OUTPATIENT)
Age: 84
End: 2024-12-30
Payer: MEDICARE

## 2024-12-30 ENCOUNTER — OUTPATIENT (OUTPATIENT)
Dept: OUTPATIENT SERVICES | Facility: HOSPITAL | Age: 84
LOS: 1 days | End: 2024-12-30
Payer: MEDICARE

## 2024-12-30 DIAGNOSIS — C91.10 CHRONIC LYMPHOCYTIC LEUKEMIA OF B-CELL TYPE NOT HAVING ACHIEVED REMISSION: ICD-10-CM

## 2024-12-30 DIAGNOSIS — Z98.890 OTHER SPECIFIED POSTPROCEDURAL STATES: Chronic | ICD-10-CM

## 2024-12-30 DIAGNOSIS — Z51.11 ENCOUNTER FOR ANTINEOPLASTIC CHEMOTHERAPY: ICD-10-CM

## 2024-12-30 DIAGNOSIS — Z90.49 ACQUIRED ABSENCE OF OTHER SPECIFIED PARTS OF DIGESTIVE TRACT: Chronic | ICD-10-CM

## 2024-12-30 DIAGNOSIS — R61 GENERALIZED HYPERHIDROSIS: ICD-10-CM

## 2024-12-30 PROBLEM — I25.10 ATHEROSCLEROTIC HEART DISEASE OF NATIVE CORONARY ARTERY WITHOUT ANGINA PECTORIS: Chronic | Status: ACTIVE | Noted: 2024-12-20

## 2024-12-30 LAB
ALBUMIN SERPL ELPH-MCNC: 4.1 G/DL
ALP BLD-CCNC: 133 U/L
ALT SERPL-CCNC: 25 U/L
ANION GAP SERPL CALC-SCNC: 10 MMOL/L
AST SERPL-CCNC: 18 U/L
BILIRUB SERPL-MCNC: 0.5 MG/DL
BUN SERPL-MCNC: 19 MG/DL
CALCIUM SERPL-MCNC: 8.9 MG/DL
CHLORIDE SERPL-SCNC: 102 MMOL/L
CO2 SERPL-SCNC: 24 MMOL/L
CREAT SERPL-MCNC: 1 MG/DL
EGFR: 75 ML/MIN/1.73M2
GLUCOSE SERPL-MCNC: 107 MG/DL
HCT VFR BLD CALC: 37.5 %
HGB BLD-MCNC: 12.7 G/DL
MCHC RBC-ENTMCNC: 33.1 PG
MCHC RBC-ENTMCNC: 33.9 G/DL
MCV RBC AUTO: 97.7 FL
PLATELET # BLD AUTO: 197 K/UL
PMV BLD: 9 FL
POTASSIUM SERPL-SCNC: 4.6 MMOL/L
PROT SERPL-MCNC: 5.8 G/DL
RBC # BLD: 3.84 M/UL
RBC # FLD: 12.6 %
SODIUM SERPL-SCNC: 136 MMOL/L
TSH SERPL-ACNC: 5.71 UIU/ML
WBC # FLD AUTO: 6.38 K/UL

## 2024-12-30 PROCEDURE — 99214 OFFICE O/P EST MOD 30 MIN: CPT

## 2024-12-30 PROCEDURE — 84443 ASSAY THYROID STIM HORMONE: CPT

## 2024-12-30 PROCEDURE — 85027 COMPLETE CBC AUTOMATED: CPT

## 2024-12-30 PROCEDURE — 80053 COMPREHEN METABOLIC PANEL: CPT

## 2024-12-30 PROCEDURE — G2211 COMPLEX E/M VISIT ADD ON: CPT

## 2024-12-31 DIAGNOSIS — C91.10 CHRONIC LYMPHOCYTIC LEUKEMIA OF B-CELL TYPE NOT HAVING ACHIEVED REMISSION: ICD-10-CM

## 2025-01-02 ENCOUNTER — NON-APPOINTMENT (OUTPATIENT)
Age: 85
End: 2025-01-02

## 2025-01-02 RX ORDER — ATORVASTATIN CALCIUM 20 MG/1
20 TABLET, FILM COATED ORAL DAILY
Refills: 0 | Status: ACTIVE | COMMUNITY
Start: 2025-01-02

## 2025-01-02 RX ORDER — CLOPIDOGREL 75 MG/1
75 TABLET, FILM COATED ORAL DAILY
Refills: 0 | Status: ACTIVE | COMMUNITY
Start: 2025-01-02

## 2025-01-02 RX ORDER — ASPIRIN 81 MG/1
81 TABLET, CHEWABLE ORAL DAILY
Refills: 0 | Status: ACTIVE | COMMUNITY
Start: 2025-01-02

## 2025-01-10 PROBLEM — C91.10 CHRONIC LYMPHOCYTIC LEUKEMIA OF B-CELL TYPE NOT HAVING ACHIEVED REMISSION: Chronic | Status: ACTIVE | Noted: 2024-12-23

## 2025-01-20 VITALS
HEART RATE: 50 BPM | RESPIRATION RATE: 18 BRPM | OXYGEN SATURATION: 100 % | DIASTOLIC BLOOD PRESSURE: 70 MMHG | SYSTOLIC BLOOD PRESSURE: 120 MMHG

## 2025-01-20 NOTE — H&P CARDIOLOGY - HISTORY OF PRESENT ILLNESS
Patient is a 84y Male PMH of HTN, BPH and currently being treated for CLL (on Calquence). A NST was performed for the fatigue, SKY,  patient denies CP, palpitations, syncope. NST showed moderate partially reversible apex and inferior apical defect. Patient had Cath 12/16/24 that showed 2VD (LAD, RPDA) and CTS was consulted for minimally invasive LIMA-LAD due to patient is on Calquence for CLL but patient is high risk for bleeding due to risk of DAPT and Calquence being taken together. Patient presents to cardiology dept on 12/23/24 and had staged PCI to LAD.   Since than patient has been feeling.......  presents to cardiology dept for C for remaining lesion.      Pre cath note:  indication:  [ ] STEMI                [ ] NSTEMI                 [ ] Acute coronary syndrome                   [ ]Unstable Angina   [ ] high risk  [ ] intermediate risk  [ ] low risk                   [x ] Stable Angina     non-invasive testing: nst                         Date:                     result: [ ] high risk  [ ] intermediate risk  [ ] low risk    Anti- Anginal medications:                    [ ] not used d/t                     [ ] used   ( ) BB     ( ) CCB      ( ) Nitrate   (  ) Ranexa          [ ] not used but strong indication not to use    Ejection Fraction                   [ ] <29            [ ] 30-39%   [ ] 40-49%     [ ]>50%    CHF          [ ] active (within last 14 days on meds   [ ] Chronic (on meds but no exacerbation)  NYHA Functional Class:  (  ) Class I (no limitations)  (  ) Class II (slight limitation)  (  ) Class III (marked limitation)  (  ) Class IV (symptoms at rest)    COPD                   [ ] mild (on chronic bronchodilators)  [ ] moderate (on chronic steroid therapy)      [ ] severe (indication for home O2 or PACO2 >50)    Other risk factors:                     [ ] Previous MI                     [ ] CVA/ stroke                    [ ] carotid stent/ CEA                    [ ] PVD/PAD- (arterial aneurysm, non-palpable pulses, tortuous vessel with inability to insert catheter, infra-renal dissection, renal or subclavian artery stenosis)                    [ ] previous CABG                    [ ] Renal Failure:  on HD  (  ) yes  (  ) no                    [ ] Diabetic  (  ) Type 1  (  ) Type 2                                         (  ) Insulin dependent  (  ) non-insulin dependent                                         (  ) Metformin  (  ) Januvia  (  ) Glimepiride  (  ) Glipizide  (  ) Glyburide  (  ) Actos                                         (  ) GLP-1 receptor agonists (Ozempic, Mounjaro, Wegovy, Zepbound, Trulicity, Byetta, Victoza)                                         (  ) SGLT2 Inhibitors (Farxiga, Jardiance, Invokana)                                         (  ) Other                Bleeding Risk:     Pre-cath Hydration: (  )  cc IV bolus x 1 over 1 hr followed by:    (  ) NS @ 75cc/hr until procedure (up to 2 hrs) if EF> 50%                                                                                                                             (  ) NS @ 50cc/hr until procedure (up to 2 hrs) if EF< 50%                                        (  ) No precath hydration d/t      RIGHT RADIAL ARTERY EVALUATION:  NADIR TEST: [] Negative          [] Positive    EF:   Date:    EKG:   Date: Patient is a 84y Male PMH of HTN, BPH and currently being treated for CLL (on Calquence). A NST was performed for the fatigue, SKY,  patient denies CP, palpitations, syncope. NST showed moderate partially reversible apex and inferior apical defect. Patient had Cath 12/16/24 that showed 2VD (LAD, RPDA) and CTS was consulted for minimally invasive LIMA-LAD due to patient is on Calquence for CLL but patient is high risk for bleeding due to risk of DAPT and Calquence being taken together. Patient presents to cardiology dept on 12/23/24 and had staged PCI to LAD.   Patient presents to cardiology dept for lHC for remaining lesion.      Pre cath note:  indication:  [ ] STEMI                [ ] NSTEMI                 [ ] Acute coronary syndrome                   [ ]Unstable Angina   [ ] high risk  [ ] intermediate risk  [ ] low risk                   [x ] Stable Angina     non-invasive testing: nst     in office                    Date:                     result: [ ] high risk  [ ] intermediate risk  [ ] low risk    Anti- Anginal medications:                    [x ] not used d/t marginal bp/susan HR                    [ ] used   ( ) BB     ( ) CCB      ( ) Nitrate   (  ) Ranexa          [ ] not used but strong indication not to use    Ejection Fraction                   [ ] <29            [ ] 30-39%   [ ] 40-49%     [ ]>50%    CHF          [ ] active (within last 14 days on meds   [ ] Chronic (on meds but no exacerbation)  NYHA Functional Class:  (  ) Class I (no limitations)  (  ) Class II (slight limitation)  (  ) Class III (marked limitation)  (  ) Class IV (symptoms at rest)    COPD                   [ ] mild (on chronic bronchodilators)  [ ] moderate (on chronic steroid therapy)      [ ] severe (indication for home O2 or PACO2 >50)    Other risk factors:                     [ ] Previous MI                     [ ] CVA/ stroke                    [ ] carotid stent/ CEA                    [ ] PVD/PAD- (arterial aneurysm, non-palpable pulses, tortuous vessel with inability to insert catheter, infra-renal dissection, renal or subclavian artery stenosis)                    [ ] previous CABG                    [ ] Renal Failure:  on HD  (  ) yes  (  ) no                    [ ] Diabetic  (  ) Type 1  (  ) Type 2                                         (  ) Insulin dependent  (  ) non-insulin dependent                                         (  ) Metformin  (  ) Januvia  (  ) Glimepiride  (  ) Glipizide  (  ) Glyburide  (  ) Actos                                         (  ) GLP-1 receptor agonists (Ozempic, Mounjaro, Wegovy, Zepbound, Trulicity, Byetta, Victoza)                                         (  ) SGLT2 Inhibitors (Farxiga, Jardiance, Invokana)                                         (  ) Other          Bleeding Risk: 1.1%    Pre-cath Hydration: ( x )  cc IV bolus x 1 over 1 hr followed by:    ( x ) NS @ 75cc/hr until procedure (up to 2 hrs) if EF> 50%                                                                                                                             (  ) NS @ 50cc/hr until procedure (up to 2 hrs) if EF< 50%                                        (  ) No precath hydration d/t      RIGHT RADIAL ARTERY EVALUATION:  NADIR TEST: [x] Negative          [] Positive    EF:   Date: 50% from office note    EKG:   Date: 1/2/25 Sinus arrhythmia Patient is a 84y Male PMH of HTN, BPH and currently being treated for CLL (on Calquence). A NST was performed for the fatigue, SKY,  patient denies CP, palpitations, syncope. NST showed moderate partially reversible apex and inferior apical defect. Patient had Cath 12/16/24 that showed 2VD (LAD, RPDA) and CTS was consulted for minimally invasive LIMA-LAD due to patient is on Calquence for CLL but patient is high risk for bleeding due to risk of DAPT and Calquence being taken together. Patient presents to cardiology dept on 12/23/24 and had staged PCI to LAD.   Patient presents to cardiology dept for lHC for remaining lesion.      Cath 12/2024    Intervention:   POBA 1st diag  TAO PCI to Prox LAD  IVUS  Rotational arthrectomy    Implants:    SYNERGY 3.0 x 48mm to Prox LAD (AUC 7)    FINDINGS:     Coronary Dominance: Right    LM: Distal 30% disease    LAD: Moderately calcified overall vessel. 80% heavily calcified lesion in Prox to mid LAD s/p PCI  D1: mild overall disease    CX: Moderately ectatic vessel. 50% ostial disease. Mild overall disease   OM1: Mild disease.     RCA: Nott injected. Prior cath shows Moderately calcified. Moderate atherosclerosis.   RPDA: 70% ostial disease      LVEDP: 11 mmHg     Pre cath note:  indication:  [ ] STEMI                [ ] NSTEMI                 [ ] Acute coronary syndrome                   [ ]Unstable Angina   [ ] high risk  [ ] intermediate risk  [ ] low risk                   [x ] Stable Angina     non-invasive testing: nst     in office                    Date:                     result: [ ] high risk  [ ] intermediate risk  [ ] low risk    Anti- Anginal medications:                    [x ] not used d/t marginal bp/susan HR                    [ ] used   ( ) BB     ( ) CCB      ( ) Nitrate   (  ) Ranexa          [ ] not used but strong indication not to use    Ejection Fraction                   [ ] <29            [ ] 30-39%   [ ] 40-49%     [ ]>50%    CHF          [ ] active (within last 14 days on meds   [ ] Chronic (on meds but no exacerbation)  NYHA Functional Class:  (  ) Class I (no limitations)  (  ) Class II (slight limitation)  (  ) Class III (marked limitation)  (  ) Class IV (symptoms at rest)    COPD                   [ ] mild (on chronic bronchodilators)  [ ] moderate (on chronic steroid therapy)      [ ] severe (indication for home O2 or PACO2 >50)    Other risk factors:                     [ ] Previous MI                     [ ] CVA/ stroke                    [ ] carotid stent/ CEA                    [ ] PVD/PAD- (arterial aneurysm, non-palpable pulses, tortuous vessel with inability to insert catheter, infra-renal dissection, renal or subclavian artery stenosis)                    [ ] previous CABG                    [ ] Renal Failure:  on HD  (  ) yes  (  ) no                    [ ] Diabetic  (  ) Type 1  (  ) Type 2                                         (  ) Insulin dependent  (  ) non-insulin dependent                                         (  ) Metformin  (  ) Januvia  (  ) Glimepiride  (  ) Glipizide  (  ) Glyburide  (  ) Actos                                         (  ) GLP-1 receptor agonists (Ozempic, Mounjaro, Wegovy, Zepbound, Trulicity, Byetta, Victoza)                                         (  ) SGLT2 Inhibitors (Farxiga, Jardiance, Invokana)                                         (  ) Other          Bleeding Risk: 1.1%    Pre-cath Hydration: ( x )  cc IV bolus x 1 over 1 hr followed by:    ( x ) NS @ 75cc/hr until procedure (up to 2 hrs) if EF> 50%                                                                                                                             (  ) NS @ 50cc/hr until procedure (up to 2 hrs) if EF< 50%                                        (  ) No precath hydration d/t      RIGHT RADIAL ARTERY EVALUATION:  NADIR TEST: [x] Negative          [] Positive    EF:   Date: 50% from office note    EKG:   Date: 1/2/25 Sinus arrhythmia

## 2025-01-20 NOTE — H&P CARDIOLOGY - NSICDXPASTMEDICALHX_GEN_ALL_CORE_FT
PAST MEDICAL HISTORY:  CAD S/P percutaneous coronary angioplasty     CLL (chronic lymphocytic leukemia)     History of BPH     HTN (hypertension)

## 2025-01-27 ENCOUNTER — INPATIENT (INPATIENT)
Facility: HOSPITAL | Age: 85
LOS: 0 days | Discharge: HOME | DRG: 303 | End: 2025-01-28
Attending: INTERNAL MEDICINE | Admitting: INTERNAL MEDICINE
Payer: MEDICARE

## 2025-01-27 DIAGNOSIS — Z90.49 ACQUIRED ABSENCE OF OTHER SPECIFIED PARTS OF DIGESTIVE TRACT: Chronic | ICD-10-CM

## 2025-01-27 DIAGNOSIS — Z98.890 OTHER SPECIFIED POSTPROCEDURAL STATES: Chronic | ICD-10-CM

## 2025-01-27 DIAGNOSIS — I25.10 ATHEROSCLEROTIC HEART DISEASE OF NATIVE CORONARY ARTERY WITHOUT ANGINA PECTORIS: ICD-10-CM

## 2025-01-27 PROBLEM — I10 ESSENTIAL (PRIMARY) HYPERTENSION: Chronic | Status: INACTIVE | Noted: 2024-12-13 | Resolved: 2025-01-27

## 2025-01-27 LAB
ANION GAP SERPL CALC-SCNC: 13 MMOL/L — SIGNIFICANT CHANGE UP (ref 7–14)
ANION GAP SERPL CALC-SCNC: 6 MMOL/L — LOW (ref 7–14)
BUN SERPL-MCNC: 19 MG/DL — SIGNIFICANT CHANGE UP (ref 10–20)
BUN SERPL-MCNC: 19 MG/DL — SIGNIFICANT CHANGE UP (ref 10–20)
CALCIUM SERPL-MCNC: 8.2 MG/DL — LOW (ref 8.4–10.4)
CALCIUM SERPL-MCNC: 9.1 MG/DL — SIGNIFICANT CHANGE UP (ref 8.4–10.5)
CHLORIDE SERPL-SCNC: 104 MMOL/L — SIGNIFICANT CHANGE UP (ref 98–110)
CHLORIDE SERPL-SCNC: 107 MMOL/L — SIGNIFICANT CHANGE UP (ref 98–110)
CO2 SERPL-SCNC: 24 MMOL/L — SIGNIFICANT CHANGE UP (ref 17–32)
CO2 SERPL-SCNC: 24 MMOL/L — SIGNIFICANT CHANGE UP (ref 17–32)
CREAT SERPL-MCNC: 0.9 MG/DL — SIGNIFICANT CHANGE UP (ref 0.7–1.5)
CREAT SERPL-MCNC: 1 MG/DL — SIGNIFICANT CHANGE UP (ref 0.7–1.5)
EGFR: 75 ML/MIN/1.73M2 — SIGNIFICANT CHANGE UP
EGFR: 85 ML/MIN/1.73M2 — SIGNIFICANT CHANGE UP
GLUCOSE SERPL-MCNC: 109 MG/DL — HIGH (ref 70–99)
GLUCOSE SERPL-MCNC: 98 MG/DL — SIGNIFICANT CHANGE UP (ref 70–99)
HCT VFR BLD CALC: 33.3 % — LOW (ref 42–52)
HCT VFR BLD CALC: 39.6 % — LOW (ref 42–52)
HGB BLD-MCNC: 11.1 G/DL — LOW (ref 14–18)
HGB BLD-MCNC: 13.1 G/DL — LOW (ref 14–18)
MCHC RBC-ENTMCNC: 33.1 G/DL — SIGNIFICANT CHANGE UP (ref 32–37)
MCHC RBC-ENTMCNC: 33.3 G/DL — SIGNIFICANT CHANGE UP (ref 32–37)
MCHC RBC-ENTMCNC: 33.5 PG — HIGH (ref 27–31)
MCHC RBC-ENTMCNC: 33.7 PG — HIGH (ref 27–31)
MCV RBC AUTO: 100.6 FL — HIGH (ref 80–94)
MCV RBC AUTO: 101.8 FL — HIGH (ref 80–94)
NRBC # BLD: 0 /100 WBCS — SIGNIFICANT CHANGE UP (ref 0–0)
NRBC # BLD: 0 /100 WBCS — SIGNIFICANT CHANGE UP (ref 0–0)
NRBC BLD-RTO: 0 /100 WBCS — SIGNIFICANT CHANGE UP (ref 0–0)
NRBC BLD-RTO: 0 /100 WBCS — SIGNIFICANT CHANGE UP (ref 0–0)
PLATELET # BLD AUTO: 104 K/UL — LOW (ref 130–400)
PLATELET # BLD AUTO: 120 K/UL — LOW (ref 130–400)
PMV BLD: 10.1 FL — SIGNIFICANT CHANGE UP (ref 7.4–10.4)
PMV BLD: 10.3 FL — SIGNIFICANT CHANGE UP (ref 7.4–10.4)
POTASSIUM SERPL-MCNC: 4.3 MMOL/L — SIGNIFICANT CHANGE UP (ref 3.5–5)
POTASSIUM SERPL-MCNC: 4.9 MMOL/L — SIGNIFICANT CHANGE UP (ref 3.5–5)
POTASSIUM SERPL-SCNC: 4.3 MMOL/L — SIGNIFICANT CHANGE UP (ref 3.5–5)
POTASSIUM SERPL-SCNC: 4.9 MMOL/L — SIGNIFICANT CHANGE UP (ref 3.5–5)
RBC # BLD: 3.31 M/UL — LOW (ref 4.7–6.1)
RBC # BLD: 3.89 M/UL — LOW (ref 4.7–6.1)
RBC # FLD: 13.1 % — SIGNIFICANT CHANGE UP (ref 11.5–14.5)
RBC # FLD: 13.2 % — SIGNIFICANT CHANGE UP (ref 11.5–14.5)
SODIUM SERPL-SCNC: 137 MMOL/L — SIGNIFICANT CHANGE UP (ref 135–146)
SODIUM SERPL-SCNC: 141 MMOL/L — SIGNIFICANT CHANGE UP (ref 135–146)
WBC # BLD: 5.51 K/UL — SIGNIFICANT CHANGE UP (ref 4.8–10.8)
WBC # BLD: 5.8 K/UL — SIGNIFICANT CHANGE UP (ref 4.8–10.8)
WBC # FLD AUTO: 5.51 K/UL — SIGNIFICANT CHANGE UP (ref 4.8–10.8)
WBC # FLD AUTO: 5.8 K/UL — SIGNIFICANT CHANGE UP (ref 4.8–10.8)

## 2025-01-27 PROCEDURE — 92972 PERQ TRLUML CORONRY LITHOTRP: CPT

## 2025-01-27 PROCEDURE — 93005 ELECTROCARDIOGRAM TRACING: CPT

## 2025-01-27 PROCEDURE — 92978 ENDOLUMINL IVUS OCT C 1ST: CPT | Mod: RC

## 2025-01-27 PROCEDURE — 92928 PRQ TCAT PLMT NTRAC ST 1 LES: CPT | Mod: RC

## 2025-01-27 PROCEDURE — 99232 SBSQ HOSP IP/OBS MODERATE 35: CPT

## 2025-01-27 PROCEDURE — 80053 COMPREHEN METABOLIC PANEL: CPT

## 2025-01-27 PROCEDURE — 83735 ASSAY OF MAGNESIUM: CPT

## 2025-01-27 PROCEDURE — 93010 ELECTROCARDIOGRAM REPORT: CPT

## 2025-01-27 PROCEDURE — 36415 COLL VENOUS BLD VENIPUNCTURE: CPT

## 2025-01-27 PROCEDURE — 85025 COMPLETE CBC W/AUTO DIFF WBC: CPT

## 2025-01-27 PROCEDURE — 80048 BASIC METABOLIC PNL TOTAL CA: CPT

## 2025-01-27 PROCEDURE — 85027 COMPLETE CBC AUTOMATED: CPT

## 2025-01-27 RX ORDER — ASPIRIN 81 MG/1
81 TABLET, COATED ORAL DAILY
Refills: 0 | Status: DISCONTINUED | OUTPATIENT
Start: 2025-01-28 | End: 2025-01-28

## 2025-01-27 RX ORDER — BACTERIOSTATIC SODIUM CHLORIDE 0.9 %
1000 VIAL (ML) INJECTION
Refills: 0 | Status: DISCONTINUED | OUTPATIENT
Start: 2025-01-27 | End: 2025-01-28

## 2025-01-27 RX ORDER — TAMSULOSIN HYDROCHLORIDE 0.4 MG/1
0.4 CAPSULE ORAL
Refills: 0 | Status: DISCONTINUED | OUTPATIENT
Start: 2025-01-27 | End: 2025-01-28

## 2025-01-27 RX ORDER — TAMSULOSIN HYDROCHLORIDE 0.4 MG/1
0.4 CAPSULE ORAL AT BEDTIME
Refills: 0 | Status: DISCONTINUED | OUTPATIENT
Start: 2025-01-27 | End: 2025-01-28

## 2025-01-27 RX ORDER — ATORVASTATIN CALCIUM 80 MG/1
20 TABLET, FILM COATED ORAL AT BEDTIME
Refills: 0 | Status: DISCONTINUED | OUTPATIENT
Start: 2025-01-27 | End: 2025-01-28

## 2025-01-27 RX ORDER — ANTISEPTIC SURGICAL SCRUB 0.04 MG/ML
1 SOLUTION TOPICAL ONCE
Refills: 0 | Status: DISCONTINUED | OUTPATIENT
Start: 2025-01-27 | End: 2025-01-28

## 2025-01-27 RX ORDER — ACETAMINOPHEN 160 MG/5ML
650 SUSPENSION ORAL ONCE
Refills: 0 | Status: COMPLETED | OUTPATIENT
Start: 2025-01-27 | End: 2025-01-27

## 2025-01-27 RX ORDER — PANTOPRAZOLE 20 MG/1
40 TABLET, DELAYED RELEASE ORAL
Refills: 0 | Status: DISCONTINUED | OUTPATIENT
Start: 2025-01-27 | End: 2025-01-28

## 2025-01-27 RX ORDER — BACTERIOSTATIC SODIUM CHLORIDE 0.9 %
250 VIAL (ML) INJECTION ONCE
Refills: 0 | Status: COMPLETED | OUTPATIENT
Start: 2025-01-27 | End: 2025-01-27

## 2025-01-27 RX ADMIN — ACETAMINOPHEN 650 MILLIGRAM(S): 160 SUSPENSION ORAL at 22:53

## 2025-01-27 RX ADMIN — TAMSULOSIN HYDROCHLORIDE 0.4 MILLIGRAM(S): 0.4 CAPSULE ORAL at 21:13

## 2025-01-27 RX ADMIN — ACETAMINOPHEN 650 MILLIGRAM(S): 160 SUSPENSION ORAL at 21:45

## 2025-01-27 RX ADMIN — Medication 100 MILLILITER(S): at 10:05

## 2025-01-27 RX ADMIN — Medication 500 MILLILITER(S): at 07:16

## 2025-01-27 RX ADMIN — Medication 75 MILLILITER(S): at 07:16

## 2025-01-27 RX ADMIN — Medication 300 MILLIGRAM(S): at 07:16

## 2025-01-27 RX ADMIN — ATORVASTATIN CALCIUM 20 MILLIGRAM(S): 80 TABLET, FILM COATED ORAL at 21:13

## 2025-01-27 NOTE — PATIENT PROFILE ADULT - FALL HARM RISK - HARM RISK INTERVENTIONS
Assistance with ambulation/Assistance OOB with selected safe patient handling equipment/Communicate Risk of Fall with Harm to all staff/Monitor gait and stability/Reinforce activity limits and safety measures with patient and family/Review medications for side effects contributing to fall risk/Sit up slowly, dangle for a short time, stand at bedside before walking/Tailored Fall Risk Interventions/Use of alarms - bed, chair and/or voice tab/Visual Cue: Yellow wristband and red socks/Bed in lowest position, wheels locked, appropriate side rails in place/Call bell, personal items and telephone in reach/Instruct patient to call for assistance before getting out of bed or chair/Non-slip footwear when patient is out of bed/[place] to call system/Physically safe environment - no spills, clutter or unnecessary equipment/Purposeful Proactive Rounding/Room/bathroom lighting operational, light cord in reach

## 2025-01-27 NOTE — PROGRESS NOTE ADULT - ASSESSMENT
85 yo M with PMHX of CAD s/p minimally invasive LIMA-LAD, HTN, HLD, CLL presents for elective cath, s/p PCI of ostial/proximal RCA, being admitted overnight for observation.    #CAD s/p PCI to RCA, hx of PCI to LAD  #Hx of Minimally Invasive LIMA-LAD  - s/p shockwave lithotripsy, balloon angioplasty and TAO to ostial-proximal RCA  - R groin without swelling, bleeding  - C/w ASA, Plavix, statin  - C/w PPI  - Discharge in AM    #BPH  - C/w Finasteride, Tamsulosin    DVT ppx: None  GI ppx: PPI  Diet: DASH  Activity: IAT

## 2025-01-27 NOTE — PROGRESS NOTE ADULT - NUTRITIONAL ASSESSMENT
83 yo M with PMHX of CAD s/p minimally invasive LIMA-LAD, HTN, HLD, CLL presents for elective cath, s/p PCI of ostial/proximal RCA, being admitted overnight for observation.    #CAD s/p PCI to RCA, hx of PCI to LAD  #Hx of Minimally Invasive LIMA-LAD  - s/p shockwave lithotripsy, balloon angioplasty and TAO to ostial-proximal RCA  - R groin without swelling, bleeding  - C/w ASA, Plavix, statin  - C/w PPI  - Discharge in AM    #BPH  - C/w Finasteride, Tamsulosin    DVT ppx: None  GI ppx: PPI  Diet: DASH  Activity: IAT

## 2025-01-27 NOTE — CHART NOTE - NSCHARTNOTEFT_GEN_A_CORE
PRE-OP DIAGNOSIS: Staged PCI (AUC 7)    PROCEDURE:     [x] Coronary Angiogram   [x] LHC   [] LVG   [] RHC     PRIMARY PHYSICIAN:  Dr. Small   FELLOW: Dr. Tran, Dr. Mobley      PROCEDURE DESCRIPTION:   Anesthesia: Local + Sedation     Access & Closure:     6-Fr Femoral Artery => Perclose     IV Contrast:  120mL      ESTIMATED BLOOD LOSS: <10cc  SPECIMENS REMOVED: None    Intervention: PCI w/ balloon angioplasty, cutting balloon, shockwave lithotripsy, and TAO x1 to ostial-proximal RCA.  Implants: SYNERGY3.5 x28 MM        FINDINGS:   DOMINANCE: Right     LM: 20-30% distal disease     LAD: Mild proximal disease. Patent prior stent. Mild diffuse distal disease  D1: Mild disease    CX: 50% ostial disease    RCA: Severe ostial-proximal heavily calcified disease s/p PCI. 70% distal disease  RPL: Mild disease  RPDA: 70% ostial disease      LVEDP:  18 mmHg   EF:  55%     POST-OP DIAGNOSIS:  2-Vessel Coronary Artery Disease   Severe ostial-proximal RCA heavily calcified disease s/p PCI  Moderate ostial LCX disease  Patent prior LAD stent        PLAN OF CARE:   [x] Post-procedure LVEDP-guided IV fluids:  /hr x 6hrs  [x] Medications: ASA/Plavix. High-intensity statin.      DISPOSITION:  [x] D/C Home Today

## 2025-01-27 NOTE — PROGRESS NOTE ADULT - ATTENDING COMMENTS
s/p PCI RCA without complication.  No chest pain.  Hemodynamics stable.  No groin hematoma.    IMPRESSION:  1. CAD s/p CABG  s/p PCI RCA without complication    PLAN:  DAPT  Monitor overnight.  Plan for discharge tomorrow.

## 2025-01-28 ENCOUNTER — TRANSCRIPTION ENCOUNTER (OUTPATIENT)
Age: 85
End: 2025-01-28

## 2025-01-28 VITALS
TEMPERATURE: 98 F | SYSTOLIC BLOOD PRESSURE: 102 MMHG | RESPIRATION RATE: 19 BRPM | OXYGEN SATURATION: 99 % | DIASTOLIC BLOOD PRESSURE: 55 MMHG | HEART RATE: 81 BPM

## 2025-01-28 LAB
ALBUMIN SERPL ELPH-MCNC: 3.7 G/DL — SIGNIFICANT CHANGE UP (ref 3.5–5.2)
ALP SERPL-CCNC: 66 U/L — SIGNIFICANT CHANGE UP (ref 30–115)
ALT FLD-CCNC: 16 U/L — SIGNIFICANT CHANGE UP (ref 0–41)
ANION GAP SERPL CALC-SCNC: 9 MMOL/L — SIGNIFICANT CHANGE UP (ref 7–14)
AST SERPL-CCNC: 19 U/L — SIGNIFICANT CHANGE UP (ref 0–41)
BASOPHILS # BLD AUTO: 0.03 K/UL — SIGNIFICANT CHANGE UP (ref 0–0.2)
BASOPHILS NFR BLD AUTO: 0.5 % — SIGNIFICANT CHANGE UP (ref 0–1)
BILIRUB SERPL-MCNC: 0.5 MG/DL — SIGNIFICANT CHANGE UP (ref 0.2–1.2)
BUN SERPL-MCNC: 18 MG/DL — SIGNIFICANT CHANGE UP (ref 10–20)
CALCIUM SERPL-MCNC: 8.1 MG/DL — LOW (ref 8.4–10.5)
CHLORIDE SERPL-SCNC: 107 MMOL/L — SIGNIFICANT CHANGE UP (ref 98–110)
CO2 SERPL-SCNC: 23 MMOL/L — SIGNIFICANT CHANGE UP (ref 17–32)
CREAT SERPL-MCNC: 0.9 MG/DL — SIGNIFICANT CHANGE UP (ref 0.7–1.5)
EGFR: 85 ML/MIN/1.73M2 — SIGNIFICANT CHANGE UP
EOSINOPHIL # BLD AUTO: 0.26 K/UL — SIGNIFICANT CHANGE UP (ref 0–0.7)
EOSINOPHIL NFR BLD AUTO: 4.6 % — SIGNIFICANT CHANGE UP (ref 0–8)
GLUCOSE SERPL-MCNC: 99 MG/DL — SIGNIFICANT CHANGE UP (ref 70–99)
HCT VFR BLD CALC: 33.5 % — LOW (ref 42–52)
HGB BLD-MCNC: 11.5 G/DL — LOW (ref 14–18)
IMM GRANULOCYTES NFR BLD AUTO: 0.4 % — HIGH (ref 0.1–0.3)
LYMPHOCYTES # BLD AUTO: 1.52 K/UL — SIGNIFICANT CHANGE UP (ref 1.2–3.4)
LYMPHOCYTES # BLD AUTO: 26.9 % — SIGNIFICANT CHANGE UP (ref 20.5–51.1)
MAGNESIUM SERPL-MCNC: 1.9 MG/DL — SIGNIFICANT CHANGE UP (ref 1.8–2.4)
MCHC RBC-ENTMCNC: 34.2 PG — HIGH (ref 27–31)
MCHC RBC-ENTMCNC: 34.3 G/DL — SIGNIFICANT CHANGE UP (ref 32–37)
MCV RBC AUTO: 99.7 FL — HIGH (ref 80–94)
MONOCYTES # BLD AUTO: 0.73 K/UL — HIGH (ref 0.1–0.6)
MONOCYTES NFR BLD AUTO: 12.9 % — HIGH (ref 1.7–9.3)
NEUTROPHILS # BLD AUTO: 3.09 K/UL — SIGNIFICANT CHANGE UP (ref 1.4–6.5)
NEUTROPHILS NFR BLD AUTO: 54.7 % — SIGNIFICANT CHANGE UP (ref 42.2–75.2)
NRBC # BLD: 0 /100 WBCS — SIGNIFICANT CHANGE UP (ref 0–0)
NRBC BLD-RTO: 0 /100 WBCS — SIGNIFICANT CHANGE UP (ref 0–0)
PLATELET # BLD AUTO: 107 K/UL — LOW (ref 130–400)
PMV BLD: 10.4 FL — SIGNIFICANT CHANGE UP (ref 7.4–10.4)
POTASSIUM SERPL-MCNC: 3.9 MMOL/L — SIGNIFICANT CHANGE UP (ref 3.5–5)
POTASSIUM SERPL-SCNC: 3.9 MMOL/L — SIGNIFICANT CHANGE UP (ref 3.5–5)
PROT SERPL-MCNC: 5.1 G/DL — LOW (ref 6–8)
RBC # BLD: 3.36 M/UL — LOW (ref 4.7–6.1)
RBC # FLD: 12.9 % — SIGNIFICANT CHANGE UP (ref 11.5–14.5)
SODIUM SERPL-SCNC: 139 MMOL/L — SIGNIFICANT CHANGE UP (ref 135–146)
WBC # BLD: 5.65 K/UL — SIGNIFICANT CHANGE UP (ref 4.8–10.8)
WBC # FLD AUTO: 5.65 K/UL — SIGNIFICANT CHANGE UP (ref 4.8–10.8)

## 2025-01-28 PROCEDURE — 99238 HOSP IP/OBS DSCHRG MGMT 30/<: CPT

## 2025-01-28 PROCEDURE — 93010 ELECTROCARDIOGRAM REPORT: CPT

## 2025-01-28 RX ORDER — PANTOPRAZOLE 20 MG/1
1 TABLET, DELAYED RELEASE ORAL
Qty: 14 | Refills: 0
Start: 2025-01-28 | End: 2025-02-10

## 2025-01-28 RX ORDER — PANTOPRAZOLE 20 MG/1
1 TABLET, DELAYED RELEASE ORAL
Qty: 30 | Refills: 0
Start: 2025-01-28 | End: 2025-02-26

## 2025-01-28 RX ADMIN — Medication 75 MILLIGRAM(S): at 11:31

## 2025-01-28 RX ADMIN — TAMSULOSIN HYDROCHLORIDE 0.4 MILLIGRAM(S): 0.4 CAPSULE ORAL at 08:16

## 2025-01-28 RX ADMIN — ASPIRIN 81 MILLIGRAM(S): 81 TABLET, COATED ORAL at 11:31

## 2025-01-28 RX ADMIN — Medication 5 MILLIGRAM(S): at 11:31

## 2025-01-28 RX ADMIN — PANTOPRAZOLE 40 MILLIGRAM(S): 20 TABLET, DELAYED RELEASE ORAL at 06:37

## 2025-01-28 NOTE — DISCHARGE NOTE NURSING/CASE MANAGEMENT/SOCIAL WORK - PATIENT PORTAL LINK FT
You can access the FollowMyHealth Patient Portal offered by [facility] by registering at the following website: [email or web address]. By joining [facility]’s FollowMyHealth portal, you will also be able to view your health information using other applications (apps) compatible with our system.

## 2025-01-28 NOTE — DISCHARGE NOTE PROVIDER - NSDCMRMEDTOKEN_GEN_ALL_CORE_FT
aspirin 81 mg oral tablet: orally once a day  atorvastatin 20 mg oral tablet: 1 tab(s) orally once a day (at bedtime)  clopidogrel 75 mg oral tablet: 1 tab(s) orally once a day MDD: 1  dutasteride 0.5 mg oral capsule: 1 cap(s) orally once a day  pantoprazole 40 mg oral delayed release tablet: 1 tab(s) orally once a day (before a meal)  tamsulosin 0.4 mg oral capsule: 1 cap(s) orally 2 times a day

## 2025-01-28 NOTE — PROGRESS NOTE ADULT - SUBJECTIVE AND OBJECTIVE BOX
CHIEF COMPLAINT:  Patient is a 84y old  Male who presents with a chief complaint of     INTERVAL HISTORY/OVERNIGHT EVENTS:  Pt feels well, is ambulating to bathroom. No pain/swelling/bleeding in right groin.    ======================  MEDICATIONS:  atorvastatin 20 milliGRAM(s) Oral at bedtime  chlorhexidine 4% Liquid 1 Application(s) Topical once  finasteride 5 milliGRAM(s) Oral daily  pantoprazole    Tablet 40 milliGRAM(s) Oral before breakfast  tamsulosin 0.4 milliGRAM(s) Oral with breakfast  tamsulosin 0.4 milliGRAM(s) Oral at bedtime    DRIPS:  sodium chloride 0.9%. 1000 milliLiter(s) (75 mL/Hr) IV Continuous <Continuous>  sodium chloride 0.9%. 1000 milliLiter(s) (100 mL/Hr) IV Continuous <Continuous>    PRN:       ======================  PHYSICAL EXAMINATION:  GEN:  nad.   HEENT:  eomi. ncat  PULM:  b/l lung sounds   CARD: s1, s2  ABD: +bs. ntnd  EXT:  no new rashes.    NEURO:  no new focal deficits.   ======================  OBJECTIVE:        VS:  T(F): 97.9 (01-27 @ 16:03), Max: 98.7 (01-27 @ 13:01)  HR: 74 (01-27 @ 16:03) (52 - 85)  BP: 97/52 (01-27 @ 16:03) (97/52 - 130/71)  RR: 19 (01-27 @ 16:03) (16 - 19)  SpO2: 100% (01-27 @ 16:03) (98% - 100%)  CVP(mm Hg): --  CO: --  CI: --  PA: --  PCWP: --    I/O:      01-27 @ 07:01  -  01-27 @ 17:59  --------------------------------------------------------  IN: 840 mL / OUT: 0 mL / NET: 840 mL        Weight trend:  Weight (kg): 90.7 (01-27)    ======================    LABS:                          13.1   5.80  )-----------( 120      ( 27 Jan 2025 06:42 )             39.6     01-27    141  |  104  |  19  ----------------------------<  98  4.9   |  24  |  1.0    Ca    9.1      27 Jan 2025 06:42                  Urinalysis Basic - ( 27 Jan 2025 06:42 )    Color: x / Appearance: x / SG: x / pH: x  Gluc: 98 mg/dL / Ketone: x  / Bili: x / Urobili: x   Blood: x / Protein: x / Nitrite: x   Leuk Esterase: x / RBC: x / WBC x   Sq Epi: x / Non Sq Epi: x / Bacteria: x        Cultures:        
Cardiology Follow up s/p PCI    ERNST KEVIN   84y Male  PAST MEDICAL & SURGICAL HISTORY:  History of BPH      CAD S/P percutaneous coronary angioplasty      CLL (chronic lymphocytic leukemia)      Bilateral cataracts      History of cholecystectomy      History of prostate biopsy           HPI:  Patient is a 84y Male PMH of HTN, BPH and currently being treated for CLL (on Calquence). A NST was performed for the fatigue, SKY,  patient denies CP, palpitations, syncope. NST showed moderate partially reversible apex and inferior apical defect. Patient had Cath 12/16/24 that showed 2VD (LAD, RPDA) and CTS was consulted for minimally invasive LIMA-LAD due to patient is on Calquence for CLL but patient is high risk for bleeding due to risk of DAPT and Calquence being taken together. Patient presents to cardiology dept on 12/23/24 and had staged PCI to LAD.   Patient presents to cardiology dept for lHC for remaining lesion.      Allergies    Cipro (Rash)    Intolerances    Patient seen and examined at bedside. No acute events overnight.  Patient without complaints. Pt ambulated without issues/symptoms  Denies CP, SOB, palpitations, or dizziness  PVC's noted on telemetry overnight    Vital Signs Last 24 Hrs  T(C): 36.9 (28 Jan 2025 00:00), Max: 37.1 (27 Jan 2025 13:01)  T(F): 98.4 (28 Jan 2025 00:00), Max: 98.7 (27 Jan 2025 13:01)  HR: 69 (28 Jan 2025 04:00) (69 - 85)  BP: 99/55 (28 Jan 2025 04:00) (97/52 - 116/63)  BP(mean): 74 (28 Jan 2025 04:00) (68 - 78)  RR: 20 (28 Jan 2025 04:00) (16 - 20)  SpO2: 100% (28 Jan 2025 04:00) (98% - 100%)    Parameters below as of 28 Jan 2025 04:00  Patient On (Oxygen Delivery Method): room air        MEDICATIONS  (STANDING):  aspirin  chewable 81 milliGRAM(s) Oral daily  atorvastatin 20 milliGRAM(s) Oral at bedtime  chlorhexidine 4% Liquid 1 Application(s) Topical once  clopidogrel Tablet 75 milliGRAM(s) Oral daily  finasteride 5 milliGRAM(s) Oral daily  pantoprazole    Tablet 40 milliGRAM(s) Oral before breakfast  sodium chloride 0.9%. 1000 milliLiter(s) (75 mL/Hr) IV Continuous <Continuous>  sodium chloride 0.9%. 1000 milliLiter(s) (100 mL/Hr) IV Continuous <Continuous>  tamsulosin 0.4 milliGRAM(s) Oral with breakfast  tamsulosin 0.4 milliGRAM(s) Oral at bedtime    MEDICATIONS  (PRN):      REVIEW OF SYSTEMS:          All negative except as mentioned in HPI    PHYSICAL EXAM:           CONSTITUTIONAL: Well-developed; well-nourished; in no acute distress  	SKIN: warm, dry  	HEAD: Normocephalic; atraumatic  	EYES: PERRL.  	ENT: No nasal discharge, airway clear, mucous membranes moist  	NECK: Supple; non tender.  	CARD: +S1, +S2, no murmurs, gallops, or rubs. Regular rate and rhythm    	RESP: No wheezes, rales or rhonchi. CTA B/L  	ABD: soft ntnd, + BS x 4 quadrants  	EXT: moves all extremities,  no clubbing, cyanosis or edema  	NEURO: Alert and oriented x3, no focal deficits          PSYCH: Cooperative, appropriate          VASCULAR:  + Rad / + PTs / +  DPs          EXTREMITY:             Right Groin: dressing removed, access site soft, no hematoma, no pain, + pulses, no sign of infection, no numbness  	              ECG:  < from: 12 Lead ECG (01.27.25 @ 13:08) >    Ventricular Rate 71 BPM    Atrial Rate 71 BPM    P-R Interval 154 ms    QRS Duration 80 ms    Q-T Interval 394 ms    QTC Calculation(Bazett) 428 ms    P Axis 43 degrees    R Axis -7 degrees    T Axis 74 degrees    Diagnosis Line Sinus rhythm with frequent Premature ventricular complexes  Borderline ECG    Confirmed by JAZMIN PACHECO, FERNANDO ([id number]) on 1/27/2025 11:17:05 PM                                                                                                                  LABS:                          11.5   5.65  )-----------( 107      ( 28 Jan 2025 04:55 )             33.5     01-28    139  |  107  |  18  ----------------------------<  99  3.9   |  23  |  0.9    Ca    8.1[L]      28 Jan 2025 04:55  Mg     1.9     01-28    TPro  5.1[L]  /  Alb  3.7  /  TBili  0.5  /  DBili  x   /  AST  19  /  ALT  16  /  AlkPhos  66  01-28    Magnesium: 1.9 mg/dL [1.8 - 2.4] (01-28-25 @ 04:55)  LIVER FUNCTIONS - ( 28 Jan 2025 04:55 )  Alb: 3.7 g/dL / Pro: 5.1 g/dL / ALK PHOS: 66 U/L / ALT: 16 U/L / AST: 19 U/L / GGT: x             A/P:  I discussed the case with Cardiologist Dr. Small and recommend the following:  S/P PCI:  Access & Closure:     6-Fr Femoral Artery => Perclose     IV Contrast:  120mL    ESTIMATED BLOOD LOSS: <10cc  SPECIMENS REMOVED: None    Intervention: PCI w/ balloon angioplasty, cutting balloon, shockwave lithotripsy, and TAO x1 to ostial-proximal RCA.  Implants: SYNERGY3.5 x28 MM    FINDINGS:   DOMINANCE: Right     LM: 20-30% distal disease     LAD: Mild proximal disease. Patent prior stent. Mild diffuse distal disease  D1: Mild disease    CX: 50% ostial disease    RCA: Severe ostial-proximal heavily calcified disease s/p PCI. 70% distal disease  RPL: Mild disease  RPDA: 70% ostial disease    LVEDP:  18 mmHg   EF:  55%     POST-OP DIAGNOSIS:  2-Vessel Coronary Artery Disease   Severe ostial-proximal RCA heavily calcified disease s/p PCI  Moderate ostial LCX disease  Patent prior LAD stent                                            Care as per CCU team                   Ambulate around the unit, monitor access site / peripheral pulses                    Keep K = 4, Mg = 2                   No ACEi/ARB, B-Blocker due to Soft B/P and occasional bradycardia, No DM, EF NL  	     Continue DAPT ( Aspirin 81 mg PO Daily and Plavix 75 mg Daily ), Statin Therapy, PPI                   Patient given 30 day supply of ( Aspirin 81 mg daily and Plavix 75 mg daily ) to take at home                   Patient agreeing to take DAPT for at least one year or as directed by cardiologist                    Pt given instructions on importance of taking antiplatelet medication or risk acute stent thrombosis/death                   Post cath instructions, access site care and activity restrictions reviewed with patient                     Cardiac rehab information provided/referral and communication to Cardiac Rehab completed                      Benefits of Cardiac Rehab discussed with patient                   Patient instructed to call Cardiac Rehab and make first appointment after first f/u visit with Cardiologist                    Discussed with patient to return to hospital if experience chest pain, shortness breath, dizziness and site bleeding                   Aggressive risk factor modification, diet counseling, smoking cessation discussed with patient                       Can discharge patient from interventional cardiac standpoint after ambulating without symptoms and access site wnl, ECG and blood work reviewed                    Follow up with Cardiology Dr. Soliman in two weeks. Patient instructed to call and make an appointment                     Discharge instructions as follows, when ready to d/c:    Activity:  - Do not drive or operate heavy machinery for 24 hours.  - Limit your physical or any strenuous activity for 2 weeks after angioplasty and 48 hours for angiogram. Support the groin site with your hand when you sneeze or cough. No heavy lifting ( objects more then 10 pounds).  - For wrist access, avoid using affected arm for 24 hours after removal of dressing and avoid heavy lifting for 7 days.  Hygiene:  - After 24 hours, you may shower and remove the dressing from the site. Do not tub bathe for one week. Do not rub or apply lotion, cream, powder to the affected site. Leave it open to air.   Diet:   - You may resume your diet. Low Sodium. Low Fat, Low Cholesterol.  If Diabetic - Carbohydrate Consistent Diet.      - Drink extra fluid unless otherwise advised.   Special Instructions:  - Bruising or black and blue at the puncture site is possible.  - If there is bleeding from the puncture site (groin or wrist) apply direct firm pressure on the site and call 911.  - Any sudden swelling, redness, fever, discharge or severe pain, call your physician or call the cath lab.   - If you notice any scab formation in the area avoid touching the site and allow it to heal.  - Numbness or "pins and needle" sensation in the affected arm, hand, leg or if the affected site become cool to touch or pale that persist for extended period of time call your physician immediately to be checked.  - If you developed chest pain, not relieved by your usual routine medication, fainting, lethargy, weakness, report to the nearest emergency room.   - Inform your Dentist or Surgeon if you are taking Aspirin or any antiplatelet medications. Report any bleeding in your urine or stool.   Medications:  - Soreness or tenderness at the site is possible it will diminish over time. You may take Tylenol every 4-6 hours as needed. Nothing stronger is needed.  - If you are diabetic and taking medication containing Metformin, do not take them for 48 hours after the procedure.     Any questions call Cardiac Cath Lab at [phone] or [phone], Monday - Friday from 7 - 9 pm.

## 2025-01-28 NOTE — DISCHARGE NOTE NURSING/CASE MANAGEMENT/SOCIAL WORK - FINANCIAL ASSISTANCE
[facility] provides services at a reduced cost to those who are determined to be eligible through [facility]’s financial assistance program. Information regarding [facility]’s financial assistance program can be found by going to [email or web address] or by calling [phone].

## 2025-01-28 NOTE — DISCHARGE NOTE PROVIDER - HOSPITAL COURSE
85 yo M with PMHX of CAD s/p minimally invasive LIMA-LAD, HTN, HLD, CLL presents for elective cath, s/p PCI of ostial/proximal RCA, admitted overnight for observation.    #CAD s/p PCI to RCA, hx of PCI to LAD  #Hx of Minimally Invasive LIMA-LAD  - s/p shockwave lithotripsy, balloon angioplasty and TAO to ostial-proximal RCA  - R groin without swelling, bleeding  - C/w ASA, Plavix, statin  - C/w PPI    #BPH  - C/w Finasteride, Tamsulosin      Patient seen and examined this morning, feels better, no complains, plan of care discussed with the patient at bedside, instructed to take medications as prescribed, patient verbalized understanding the plan. Patient is vitally and clinically stable to be discharged home today.

## 2025-01-28 NOTE — DISCHARGE NOTE PROVIDER - NSDCFUADDINST_GEN_ALL_CORE_FT
Activity:  - Do not drive or operate heavy machinery for 24 hours.  - Limit your physical or any strenuous activity for 2 weeks after angioplasty and 48 hours for angiogram. Support the groin site with your hand when you sneeze or cough. No heavy lifting ( objects more then 10 pounds).  - For wrist access, avoid using affected arm for 24 hours after removal of dressing and avoid heavy lifting for 7 days.  Hygiene:  - After 24 hours, you may shower and remove the dressing from the site. Do not tub bathe for one week. Do not rub or apply lotion, cream, powder to the affected site. Leave it open to air.   Diet:   - You may resume your diet. Low Sodium. Low Fat, Low Cholesterol.  If Diabetic - Carbohydrate Consistent Diet.      - Drink extra fluid unless otherwise advised.   Special Instructions:  - Bruising or black and blue at the puncture site is possible.  - If there is bleeding from the puncture site (groin or wrist) apply direct firm pressure on the site and call 911.  - Any sudden swelling, redness, fever, discharge or severe pain, call your physician or call the cath lab.   - If you notice any scab formation in the area avoid touching the site and allow it to heal.  - Numbness or "pins and needle" sensation in the affected arm, hand, leg or if the affected site become cool to touch or pale that persist for extended period of time call your physician immediately to be checked.  - If you developed chest pain, not relieved by your usual routine medication, fainting, lethargy, weakness, report to the nearest emergency room.   - Inform your Dentist or Surgeon if you are taking Aspirin or any antiplatelet medications. Report any bleeding in your urine or stool.   Medications:  - Soreness or tenderness at the site is possible it will diminish over time. You may take Tylenol every 4-6 hours as needed. Nothing stronger is needed.  - If you are diabetic and taking medication containing Metformin, do not take them for 48 hours after the procedure.     Any questions call Cardiac Cath Lab at [phone] or [phone], Monday - Friday from 7 - 9 pm.

## 2025-01-28 NOTE — DISCHARGE NOTE PROVIDER - CARE PROVIDER_API CALL
Shelby Soliman  Cardiovascular Disease  [address] [place] [id number]  Phone: [phone]  Fax: [phone]  Follow Up Time: 2 weeks

## 2025-01-28 NOTE — DISCHARGE NOTE PROVIDER - NSDCFUSCHEDAPPT_GEN_ALL_CORE_FT
Beni Foster  [facility] PreAdmits  Scheduled Appointment: 02/04/2025    Beni Foster  [facility]  [place]  [facility]  Scheduled Appointment: 02/04/2025

## 2025-01-28 NOTE — DISCHARGE NOTE PROVIDER - NSDCCPCAREPLAN_GEN_ALL_CORE_FT
PRINCIPAL DISCHARGE DIAGNOSIS  Diagnosis: Chest pain  Assessment and Plan of Treatment: Activity:  - Do not drive or operate heavy machinery for 24 hours.  - Limit your physical or any strenuous activity for 2 weeks after angioplasty and 48 hours for angiogram. Support the groin site with your hand when you sneeze or cough. No heavy lifting ( objects more then 10 pounds).  - For wrist access, avoid using affected arm for 24 hours after removal of dressing and avoid heavy lifting for 7 days.  Hygiene:  - After 24 hours, you may shower and remove the dressing from the site. Do not tub bathe for one week. Do not rub or apply lotion, cream, powder to the affected site. Leave it open to air.   Diet:   - You may resume your diet. Low Sodium. Low Fat, Low Cholesterol.  If Diabetic - Carbohydrate Consistent Diet.      - Drink extra fluid unless otherwise advised.   Special Instructions:  - Bruising or black and blue at the puncture site is possible.  - If there is bleeding from the puncture site (groin or wrist) apply direct firm pressure on the site and call 911.  - Any sudden swelling, redness, fever, discharge or severe pain, call your physician or call the cath lab.   - If you notice any scab formation in the area avoid touching the site and allow it to heal.  - Numbness or "pins and needle" sensation in the affected arm, hand, leg or if the affected site become cool to touch or pale that persist for extended period of time call your physician immediately to be checked.  - If you developed chest pain, not relieved by your usual routine medication, fainting, lethargy, weakness, report to the nearest emergency room.   - Inform your Dentist or Surgeon if you are taking Aspirin or any antiplatelet medications. Report any bleeding in your urine or stool.   Medications:  - Soreness or tenderness at the site is possible it will diminish over time. You may take Tylenol every 4-6 hours as needed. Nothing stronger is needed.  - If you are diabetic and taking medication containing Metformin, do not take them for 48 hours after the procedure.   Any questions call Cardiac Cath Lab at [phone]

## 2025-01-29 PROBLEM — H26.9 UNSPECIFIED CATARACT: Chronic | Status: ACTIVE | Noted: 2025-01-27

## 2025-02-04 ENCOUNTER — LABORATORY RESULT (OUTPATIENT)
Age: 85
End: 2025-02-04

## 2025-02-04 ENCOUNTER — OUTPATIENT (OUTPATIENT)
Dept: OUTPATIENT SERVICES | Facility: HOSPITAL | Age: 85
LOS: 1 days | End: 2025-02-04
Payer: MEDICARE

## 2025-02-04 ENCOUNTER — APPOINTMENT (OUTPATIENT)
Age: 85
End: 2025-02-04
Payer: MEDICARE

## 2025-02-04 VITALS
DIASTOLIC BLOOD PRESSURE: 72 MMHG | HEIGHT: 68 IN | OXYGEN SATURATION: 100 % | TEMPERATURE: 97.6 F | SYSTOLIC BLOOD PRESSURE: 123 MMHG | WEIGHT: 199 LBS | RESPIRATION RATE: 14 BRPM | HEART RATE: 81 BPM | BODY MASS INDEX: 30.16 KG/M2

## 2025-02-04 DIAGNOSIS — Z90.49 ACQUIRED ABSENCE OF OTHER SPECIFIED PARTS OF DIGESTIVE TRACT: Chronic | ICD-10-CM

## 2025-02-04 DIAGNOSIS — Z98.890 OTHER SPECIFIED POSTPROCEDURAL STATES: Chronic | ICD-10-CM

## 2025-02-04 DIAGNOSIS — C91.10 CHRONIC LYMPHOCYTIC LEUKEMIA OF B-CELL TYPE NOT HAVING ACHIEVED REMISSION: ICD-10-CM

## 2025-02-04 LAB
ALBUMIN SERPL ELPH-MCNC: 4.5 G/DL
ALP BLD-CCNC: 80 U/L
ALT SERPL-CCNC: 14 U/L
ANION GAP SERPL CALC-SCNC: 11 MMOL/L
AST SERPL-CCNC: 16 U/L
BILIRUB SERPL-MCNC: 0.4 MG/DL
BUN SERPL-MCNC: 16 MG/DL
CALCIUM SERPL-MCNC: 8.9 MG/DL
CHLORIDE SERPL-SCNC: 104 MMOL/L
CO2 SERPL-SCNC: 24 MMOL/L
CREAT SERPL-MCNC: 0.9 MG/DL
EGFR: 85 ML/MIN/1.73M2
GLUCOSE SERPL-MCNC: 94 MG/DL
HCT VFR BLD CALC: 35.3 %
HGB BLD-MCNC: 12.2 G/DL
LDH SERPL-CCNC: 172 U/L
MCHC RBC-ENTMCNC: 34.3 PG
MCHC RBC-ENTMCNC: 34.6 G/DL
MCV RBC AUTO: 99.2 FL
PLATELET # BLD AUTO: 132 K/UL
PMV BLD: 9.5 FL
POTASSIUM SERPL-SCNC: 4.6 MMOL/L
PROT SERPL-MCNC: 6 G/DL
RBC # BLD: 3.56 M/UL
RBC # FLD: 12.9 %
SODIUM SERPL-SCNC: 139 MMOL/L
WBC # FLD AUTO: 6.56 K/UL

## 2025-02-04 PROCEDURE — 85027 COMPLETE CBC AUTOMATED: CPT

## 2025-02-04 PROCEDURE — 99213 OFFICE O/P EST LOW 20 MIN: CPT

## 2025-02-04 PROCEDURE — G2211 COMPLEX E/M VISIT ADD ON: CPT

## 2025-02-04 PROCEDURE — 83615 LACTATE (LD) (LDH) ENZYME: CPT

## 2025-02-04 PROCEDURE — 80053 COMPREHEN METABOLIC PANEL: CPT

## 2025-02-05 DIAGNOSIS — I25.118 ATHEROSCLEROTIC HEART DISEASE OF NATIVE CORONARY ARTERY WITH OTHER FORMS OF ANGINA PECTORIS: ICD-10-CM

## 2025-02-05 DIAGNOSIS — Z79.02 LONG TERM (CURRENT) USE OF ANTITHROMBOTICS/ANTIPLATELETS: ICD-10-CM

## 2025-02-05 DIAGNOSIS — C91.10 CHRONIC LYMPHOCYTIC LEUKEMIA OF B-CELL TYPE NOT HAVING ACHIEVED REMISSION: ICD-10-CM

## 2025-02-05 DIAGNOSIS — I25.84 CORONARY ATHEROSCLEROSIS DUE TO CALCIFIED CORONARY LESION: ICD-10-CM

## 2025-02-05 DIAGNOSIS — Z95.1 PRESENCE OF AORTOCORONARY BYPASS GRAFT: ICD-10-CM

## 2025-02-05 DIAGNOSIS — I10 ESSENTIAL (PRIMARY) HYPERTENSION: ICD-10-CM

## 2025-02-05 DIAGNOSIS — N40.0 BENIGN PROSTATIC HYPERPLASIA WITHOUT LOWER URINARY TRACT SYMPTOMS: ICD-10-CM

## 2025-02-05 DIAGNOSIS — E78.5 HYPERLIPIDEMIA, UNSPECIFIED: ICD-10-CM

## 2025-02-05 DIAGNOSIS — Z79.82 LONG TERM (CURRENT) USE OF ASPIRIN: ICD-10-CM

## 2025-02-13 ENCOUNTER — TRANSCRIPTION ENCOUNTER (OUTPATIENT)
Age: 85
End: 2025-02-13

## 2025-02-13 ENCOUNTER — OUTPATIENT (OUTPATIENT)
Dept: OUTPATIENT SERVICES | Facility: HOSPITAL | Age: 85
LOS: 1 days | Discharge: HOME | End: 2025-02-13
Payer: MEDICARE

## 2025-02-13 VITALS
RESPIRATION RATE: 17 BRPM | DIASTOLIC BLOOD PRESSURE: 80 MMHG | WEIGHT: 199.96 LBS | SYSTOLIC BLOOD PRESSURE: 130 MMHG | HEIGHT: 69 IN | HEART RATE: 75 BPM | OXYGEN SATURATION: 98 % | TEMPERATURE: 97 F

## 2025-02-13 VITALS — SYSTOLIC BLOOD PRESSURE: 106 MMHG | DIASTOLIC BLOOD PRESSURE: 69 MMHG | HEART RATE: 66 BPM

## 2025-02-13 DIAGNOSIS — Z98.890 OTHER SPECIFIED POSTPROCEDURAL STATES: Chronic | ICD-10-CM

## 2025-02-13 DIAGNOSIS — H26.8 OTHER SPECIFIED CATARACT: ICD-10-CM

## 2025-02-13 DIAGNOSIS — Z88.1 ALLERGY STATUS TO OTHER ANTIBIOTIC AGENTS: ICD-10-CM

## 2025-02-13 DIAGNOSIS — Z90.49 ACQUIRED ABSENCE OF OTHER SPECIFIED PARTS OF DIGESTIVE TRACT: Chronic | ICD-10-CM

## 2025-02-13 DIAGNOSIS — Z79.02 LONG TERM (CURRENT) USE OF ANTITHROMBOTICS/ANTIPLATELETS: ICD-10-CM

## 2025-02-13 DIAGNOSIS — H21.562 PUPILLARY ABNORMALITY, LEFT EYE: ICD-10-CM

## 2025-02-13 DIAGNOSIS — H25.12 AGE-RELATED NUCLEAR CATARACT, LEFT EYE: ICD-10-CM

## 2025-02-13 DIAGNOSIS — Z79.82 LONG TERM (CURRENT) USE OF ASPIRIN: ICD-10-CM

## 2025-02-13 PROCEDURE — V2632: CPT

## 2025-02-13 NOTE — CHART NOTE - NSCHARTNOTEFT_GEN_A_CORE
PACU ANESTHESIA ADMISSION NOTE      Procedure: o.s. cataract extr. with iol implant    ____  Intubated  TV:______       Rate: ______      FiO2: ______    ___x_  Patent Airway    __x__  Full return of protective reflexes    __x__  Full recovery from anesthesia / back to baseline     Vitals:   T:     36      R:    14             BP:   114/56               Sat:        98          P: 65      Mental Status:  ___x_ Awake   _____ Alert   _____ Drowsy   _____ Sedated    Nausea/Vomiting:  __x__ NO  ______Yes,   See Post - Op Orders          Pain Scale (0-10):  _0____    Treatment: ____ None    ____ See Post - Op/PCA Orders    Post - Operative Fluids:   ____ Oral   ___x_ See Post - Op Orders    Plan: Discharge:   __x__Home       _____Floor     _____Critical Care    _____  Other:_________________    Comments:

## 2025-02-13 NOTE — ASU PATIENT PROFILE, ADULT - VISION (WITH CORRECTIVE LENSES IF THE PATIENT USUALLY WEARS THEM):
See HPI Partially impaired: cannot see medication labels or newsprint, but can see obstacles in path, and the surrounding layout; can count fingers at arm's length

## 2025-02-13 NOTE — ASU DISCHARGE PLAN (ADULT/PEDIATRIC) - FINANCIAL ASSISTANCE
[facility] provides services at a reduced cost to those who are determined to be eligible through [facility]’s financial assistance program. Information regarding [facility]’s financial assistance program can be found by going to [email or web address] or by calling [phone].

## 2025-02-13 NOTE — ASU PATIENT PROFILE, ADULT - NSICDXPASTMEDICALHX_GEN_ALL_CORE_FT
PAST MEDICAL HISTORY:  Bilateral cataracts     CAD S/P percutaneous coronary angioplasty     CLL (chronic lymphocytic leukemia)     History of BPH

## 2025-02-13 NOTE — ASU PATIENT PROFILE, ADULT - FALL HARM RISK - HARM RISK INTERVENTIONS

## 2025-04-08 ENCOUNTER — OUTPATIENT (OUTPATIENT)
Dept: OUTPATIENT SERVICES | Facility: HOSPITAL | Age: 85
LOS: 1 days | End: 2025-04-08
Payer: MEDICARE

## 2025-04-08 ENCOUNTER — APPOINTMENT (OUTPATIENT)
Age: 85
End: 2025-04-08
Payer: MEDICARE

## 2025-04-08 VITALS
BODY MASS INDEX: 30.31 KG/M2 | HEART RATE: 76 BPM | DIASTOLIC BLOOD PRESSURE: 71 MMHG | TEMPERATURE: 98.1 F | SYSTOLIC BLOOD PRESSURE: 105 MMHG | RESPIRATION RATE: 16 BRPM | WEIGHT: 200 LBS | HEIGHT: 68 IN

## 2025-04-08 DIAGNOSIS — C91.10 CHRONIC LYMPHOCYTIC LEUKEMIA OF B-CELL TYPE NOT HAVING ACHIEVED REMISSION: ICD-10-CM

## 2025-04-08 DIAGNOSIS — Z51.11 ENCOUNTER FOR ANTINEOPLASTIC CHEMOTHERAPY: ICD-10-CM

## 2025-04-08 DIAGNOSIS — Z90.49 ACQUIRED ABSENCE OF OTHER SPECIFIED PARTS OF DIGESTIVE TRACT: Chronic | ICD-10-CM

## 2025-04-08 DIAGNOSIS — Z98.890 OTHER SPECIFIED POSTPROCEDURAL STATES: Chronic | ICD-10-CM

## 2025-04-08 LAB
AUTO BASOPHILS #: 0.02 K/UL
AUTO BASOPHILS %: 0.3 %
AUTO EOSINOPHILS #: 0.48 K/UL
AUTO EOSINOPHILS %: 7.5 %
AUTO IMMATURE GRANULOCYTES #: 0.02 K/UL
AUTO LYMPHOCYTES #: 1.74 K/UL
AUTO LYMPHOCYTES %: 27.1 %
AUTO MONOCYTES #: 0.66 K/UL
AUTO MONOCYTES %: 10.3 %
AUTO NEUTROPHILS #: 3.51 K/UL
AUTO NEUTROPHILS %: 54.5 %
AUTO NRBC #: 0 K/UL
HCT VFR BLD CALC: 38.1 %
HGB BLD-MCNC: 13.3 G/DL
IMM GRANULOCYTES NFR BLD AUTO: 0.3 %
MAN DIFF?: NORMAL
MCHC RBC-ENTMCNC: 33.8 PG
MCHC RBC-ENTMCNC: 34.9 G/DL
MCV RBC AUTO: 96.7 FL
PLATELET # BLD AUTO: 117 K/UL
PMV BLD AUTO: 0 /100 WBCS
PMV BLD: 9.1 FL
RBC # BLD: 3.94 M/UL
RBC # FLD: 12 %
WBC # FLD AUTO: 6.43 K/UL

## 2025-04-08 PROCEDURE — 99213 OFFICE O/P EST LOW 20 MIN: CPT

## 2025-04-08 PROCEDURE — 83615 LACTATE (LD) (LDH) ENZYME: CPT

## 2025-04-08 PROCEDURE — G2211 COMPLEX E/M VISIT ADD ON: CPT

## 2025-04-08 PROCEDURE — 80053 COMPREHEN METABOLIC PANEL: CPT

## 2025-04-08 PROCEDURE — 85025 COMPLETE CBC W/AUTO DIFF WBC: CPT

## 2025-04-09 DIAGNOSIS — C91.10 CHRONIC LYMPHOCYTIC LEUKEMIA OF B-CELL TYPE NOT HAVING ACHIEVED REMISSION: ICD-10-CM

## 2025-04-09 LAB
ALBUMIN SERPL ELPH-MCNC: 4 G/DL
ALP BLD-CCNC: 70 U/L
ALT SERPL-CCNC: 17 U/L
ANION GAP SERPL CALC-SCNC: 10 MMOL/L
AST SERPL-CCNC: 20 U/L
BILIRUB SERPL-MCNC: 0.3 MG/DL
BUN SERPL-MCNC: 17 MG/DL
CALCIUM SERPL-MCNC: 8.8 MG/DL
CHLORIDE SERPL-SCNC: 103 MMOL/L
CO2 SERPL-SCNC: 25 MMOL/L
CREAT SERPL-MCNC: 1 MG/DL
EGFRCR SERPLBLD CKD-EPI 2021: 75 ML/MIN/1.73M2
GLUCOSE SERPL-MCNC: 100 MG/DL
LDH SERPL-CCNC: 168 U/L
POTASSIUM SERPL-SCNC: 4.8 MMOL/L
PROT SERPL-MCNC: 5.9 G/DL
SODIUM SERPL-SCNC: 138 MMOL/L

## 2025-05-20 ENCOUNTER — APPOINTMENT (OUTPATIENT)
Age: 85
End: 2025-05-20
Payer: MEDICARE

## 2025-05-20 ENCOUNTER — OUTPATIENT (OUTPATIENT)
Dept: OUTPATIENT SERVICES | Facility: HOSPITAL | Age: 85
LOS: 1 days | End: 2025-05-20
Payer: MEDICARE

## 2025-05-20 VITALS
TEMPERATURE: 98.5 F | HEART RATE: 71 BPM | OXYGEN SATURATION: 100 % | DIASTOLIC BLOOD PRESSURE: 65 MMHG | BODY MASS INDEX: 29.7 KG/M2 | WEIGHT: 196 LBS | HEIGHT: 68 IN | RESPIRATION RATE: 16 BRPM | SYSTOLIC BLOOD PRESSURE: 106 MMHG

## 2025-05-20 DIAGNOSIS — Z90.49 ACQUIRED ABSENCE OF OTHER SPECIFIED PARTS OF DIGESTIVE TRACT: Chronic | ICD-10-CM

## 2025-05-20 DIAGNOSIS — C91.10 CHRONIC LYMPHOCYTIC LEUKEMIA OF B-CELL TYPE NOT HAVING ACHIEVED REMISSION: ICD-10-CM

## 2025-05-20 DIAGNOSIS — Z98.890 OTHER SPECIFIED POSTPROCEDURAL STATES: Chronic | ICD-10-CM

## 2025-05-20 DIAGNOSIS — Z51.11 ENCOUNTER FOR ANTINEOPLASTIC CHEMOTHERAPY: ICD-10-CM

## 2025-05-20 LAB
AUTO BASOPHILS #: 0.03 K/UL
AUTO BASOPHILS %: 0.5 %
AUTO EOSINOPHILS #: 0.25 K/UL
AUTO EOSINOPHILS %: 4.1 %
AUTO IMMATURE GRANULOCYTES #: 0.02 K/UL
AUTO LYMPHOCYTES #: 1.89 K/UL
AUTO LYMPHOCYTES %: 31 %
AUTO MONOCYTES #: 0.72 K/UL
AUTO MONOCYTES %: 11.8 %
AUTO NEUTROPHILS #: 3.19 K/UL
AUTO NEUTROPHILS %: 52.3 %
AUTO NRBC #: 0 K/UL
HCT VFR BLD CALC: 36.9 %
HGB BLD-MCNC: 12.7 G/DL
IMM GRANULOCYTES NFR BLD AUTO: 0.3 %
MAN DIFF?: NORMAL
MCHC RBC-ENTMCNC: 32.3 PG
MCHC RBC-ENTMCNC: 34.4 G/DL
MCV RBC AUTO: 93.9 FL
PLATELET # BLD AUTO: 126 K/UL
PMV BLD AUTO: 0 /100 WBCS
PMV BLD: 9.8 FL
RBC # BLD: 3.93 M/UL
RBC # FLD: 12.3 %
WBC # FLD AUTO: 6.1 K/UL

## 2025-05-20 PROCEDURE — 99214 OFFICE O/P EST MOD 30 MIN: CPT

## 2025-05-20 PROCEDURE — 80053 COMPREHEN METABOLIC PANEL: CPT

## 2025-05-20 PROCEDURE — G2211 COMPLEX E/M VISIT ADD ON: CPT

## 2025-05-20 PROCEDURE — 85025 COMPLETE CBC W/AUTO DIFF WBC: CPT

## 2025-05-20 PROCEDURE — 83615 LACTATE (LD) (LDH) ENZYME: CPT

## 2025-05-21 DIAGNOSIS — C91.10 CHRONIC LYMPHOCYTIC LEUKEMIA OF B-CELL TYPE NOT HAVING ACHIEVED REMISSION: ICD-10-CM

## 2025-05-21 LAB
ALBUMIN SERPL ELPH-MCNC: 4.2 G/DL
ALP BLD-CCNC: 72 U/L
ALT SERPL-CCNC: 14 U/L
ANION GAP SERPL CALC-SCNC: 12 MMOL/L
AST SERPL-CCNC: 20 U/L
BILIRUB SERPL-MCNC: 0.4 MG/DL
BUN SERPL-MCNC: 21 MG/DL
CALCIUM SERPL-MCNC: 8.9 MG/DL
CHLORIDE SERPL-SCNC: 106 MMOL/L
CO2 SERPL-SCNC: 25 MMOL/L
CREAT SERPL-MCNC: 1.1 MG/DL
EGFRCR SERPLBLD CKD-EPI 2021: 67 ML/MIN/1.73M2
GLUCOSE SERPL-MCNC: 107 MG/DL
LDH SERPL-CCNC: 165 U/L
POTASSIUM SERPL-SCNC: 4.4 MMOL/L
PROT SERPL-MCNC: 6 G/DL
SODIUM SERPL-SCNC: 143 MMOL/L

## 2025-06-10 ENCOUNTER — NON-APPOINTMENT (OUTPATIENT)
Age: 85
End: 2025-06-10

## 2025-08-21 ENCOUNTER — APPOINTMENT (OUTPATIENT)
Age: 85
End: 2025-08-21
Payer: MEDICARE

## 2025-08-21 VITALS
HEIGHT: 66 IN | HEART RATE: 85 BPM | DIASTOLIC BLOOD PRESSURE: 78 MMHG | BODY MASS INDEX: 32.14 KG/M2 | TEMPERATURE: 97.2 F | WEIGHT: 200 LBS | OXYGEN SATURATION: 99 % | SYSTOLIC BLOOD PRESSURE: 116 MMHG | RESPIRATION RATE: 16 BRPM

## 2025-08-21 DIAGNOSIS — C91.10 CHRONIC LYMPHOCYTIC LEUKEMIA OF B-CELL TYPE NOT HAVING ACHIEVED REMISSION: ICD-10-CM

## 2025-08-21 LAB
AUTO BASOPHILS #: 0.02 K/UL
AUTO BASOPHILS %: 0.3 %
AUTO EOSINOPHILS #: 0.12 K/UL
AUTO EOSINOPHILS %: 2 %
AUTO IMMATURE GRANULOCYTES #: 0.02 K/UL
AUTO LYMPHOCYTES #: 1.39 K/UL
AUTO LYMPHOCYTES %: 22.7 %
AUTO MONOCYTES #: 1.18 K/UL
AUTO MONOCYTES %: 19.3 %
AUTO NEUTROPHILS #: 3.38 K/UL
AUTO NEUTROPHILS %: 55.4 %
AUTO NRBC #: 0 K/UL
HCT VFR BLD CALC: 38.9 %
HGB BLD-MCNC: 13.3 G/DL
IMM GRANULOCYTES NFR BLD AUTO: 0.3 %
MAN DIFF?: NORMAL
MCHC RBC-ENTMCNC: 32.2 PG
MCHC RBC-ENTMCNC: 34.2 G/DL
MCV RBC AUTO: 94.2 FL
PLATELET # BLD AUTO: 102 K/UL
PMV BLD AUTO: 0 /100 WBCS
PMV BLD: 9.1 FL
RBC # BLD: 4.13 M/UL
RBC # FLD: 12.3 %
WBC # FLD AUTO: 6.11 K/UL

## 2025-08-21 PROCEDURE — G2211 COMPLEX E/M VISIT ADD ON: CPT

## 2025-08-21 PROCEDURE — 99214 OFFICE O/P EST MOD 30 MIN: CPT

## 2025-08-22 LAB
ALBUMIN SERPL ELPH-MCNC: 4 G/DL
ALP BLD-CCNC: 90 U/L
ALT SERPL-CCNC: 11 U/L
ANION GAP SERPL CALC-SCNC: 10 MMOL/L
AST SERPL-CCNC: 16 U/L
BILIRUB SERPL-MCNC: 0.3 MG/DL
BUN SERPL-MCNC: 14 MG/DL
CALCIUM SERPL-MCNC: 8.6 MG/DL
CHLORIDE SERPL-SCNC: 102 MMOL/L
CO2 SERPL-SCNC: 24 MMOL/L
CREAT SERPL-MCNC: 1 MG/DL
EGFRCR SERPLBLD CKD-EPI 2021: 75 ML/MIN/1.73M2
GLUCOSE SERPL-MCNC: 100 MG/DL
LDH SERPL-CCNC: 161 U/L
POTASSIUM SERPL-SCNC: 4.3 MMOL/L
PROT SERPL-MCNC: 6 G/DL
SODIUM SERPL-SCNC: 136 MMOL/L

## 2025-09-12 ENCOUNTER — APPOINTMENT (OUTPATIENT)
Age: 85
End: 2025-09-12

## 2025-09-17 LAB
AUTO BASOPHILS #: 0.03 K/UL
AUTO BASOPHILS %: 0.4 %
AUTO EOSINOPHILS #: 0.11 K/UL
AUTO EOSINOPHILS %: 1.5 %
AUTO IMMATURE GRANULOCYTES #: 0.03 K/UL
AUTO LYMPHOCYTES #: 2.55 K/UL
AUTO LYMPHOCYTES %: 34.5 %
AUTO MONOCYTES #: 0.83 K/UL
AUTO MONOCYTES %: 11.2 %
AUTO NEUTROPHILS #: 3.85 K/UL
AUTO NEUTROPHILS %: 52 %
AUTO NRBC #: 0 K/UL
HCT VFR BLD CALC: 38.6 %
HGB BLD-MCNC: 13.2 G/DL
IMM GRANULOCYTES NFR BLD AUTO: 0.4 %
MAN DIFF?: NORMAL
MCHC RBC-ENTMCNC: 32.3 PG
MCHC RBC-ENTMCNC: 34.2 G/DL
MCV RBC AUTO: 94.4 FL
PLATELET # BLD AUTO: 136 K/UL
PMV BLD AUTO: 0 /100 WBCS
PMV BLD: 9.8 FL
RBC # BLD: 4.09 M/UL
RBC # FLD: 12.1 %
WBC # FLD AUTO: 7.4 K/UL